# Patient Record
Sex: MALE | Race: BLACK OR AFRICAN AMERICAN | Employment: OTHER | URBAN - METROPOLITAN AREA
[De-identification: names, ages, dates, MRNs, and addresses within clinical notes are randomized per-mention and may not be internally consistent; named-entity substitution may affect disease eponyms.]

---

## 2017-01-04 ENCOUNTER — OFFICE VISIT (OUTPATIENT)
Dept: FAMILY MEDICINE CLINIC | Age: 62
End: 2017-01-04

## 2017-01-04 ENCOUNTER — OFFICE VISIT (OUTPATIENT)
Dept: SURGERY | Age: 62
End: 2017-01-04

## 2017-01-04 VITALS
DIASTOLIC BLOOD PRESSURE: 80 MMHG | BODY MASS INDEX: 32.34 KG/M2 | TEMPERATURE: 98 F | WEIGHT: 231 LBS | HEART RATE: 82 BPM | RESPIRATION RATE: 16 BRPM | OXYGEN SATURATION: 94 % | SYSTOLIC BLOOD PRESSURE: 124 MMHG | HEIGHT: 71 IN

## 2017-01-04 VITALS
TEMPERATURE: 98.1 F | BODY MASS INDEX: 32.2 KG/M2 | HEART RATE: 83 BPM | SYSTOLIC BLOOD PRESSURE: 118 MMHG | WEIGHT: 230 LBS | HEIGHT: 71 IN | DIASTOLIC BLOOD PRESSURE: 80 MMHG | RESPIRATION RATE: 20 BRPM

## 2017-01-04 DIAGNOSIS — Z79.4 TYPE 2 DIABETES MELLITUS WITH DIABETIC NEUROPATHY, WITH LONG-TERM CURRENT USE OF INSULIN (HCC): ICD-10-CM

## 2017-01-04 DIAGNOSIS — E11.40 TYPE 2 DIABETES MELLITUS WITH DIABETIC NEUROPATHY, WITH LONG-TERM CURRENT USE OF INSULIN (HCC): ICD-10-CM

## 2017-01-04 DIAGNOSIS — E87.6 HYPOKALEMIA: ICD-10-CM

## 2017-01-04 DIAGNOSIS — Z12.11 ENCOUNTER FOR SCREENING COLONOSCOPY FOR NON-HIGH-RISK PATIENT: Primary | ICD-10-CM

## 2017-01-04 DIAGNOSIS — I73.9 PAD (PERIPHERAL ARTERY DISEASE) (HCC): Primary | ICD-10-CM

## 2017-01-04 RX ORDER — POTASSIUM CHLORIDE 750 MG/1
10 TABLET, EXTENDED RELEASE ORAL DAILY
Qty: 30 TAB | Refills: 3 | Status: SHIPPED | OUTPATIENT
Start: 2017-01-04 | End: 2017-07-26

## 2017-01-04 RX ORDER — GABAPENTIN 300 MG/1
300 CAPSULE ORAL 2 TIMES DAILY
Qty: 60 CAP | Refills: 3 | Status: SHIPPED | OUTPATIENT
Start: 2017-01-04 | End: 2017-07-26

## 2017-01-04 NOTE — PROGRESS NOTES
Phoebe Martin is a 57 y.o. male who presents today with   Chief Complaint   Patient presents with    Colon Cancer Screening     consult                1. Have you been to the ER, urgent care clinic since your last visit? Hospitalized since your last visit? No    2. Have you seen or consulted any other health care providers outside of the [address] [place] [address] since your last visit? Include any pap smears or colon screening.  No

## 2017-01-04 NOTE — PROGRESS NOTES
In talking with Mr. Adriana Butler he states he just had a stent placed in his leg and is getting a stent placed in the other leg in a few weeks. He will be unable to stop the Xarelto at this time, so I rescheduled him to come back in 3 months for his colon screening. Hopefully at that time we will be able to stop the Xarelto for his procedure. He has had a colon screening in the past through the [place] in which he states he thinks was negative.   Shoaib Vaz NP

## 2017-01-04 NOTE — PROGRESS NOTES
1. Have you been to the ER, urgent care clinic since your last visit? Hospitalized since your last visit?12/26/16 for surgical site bleeding     2. Have you seen or consulted any other health care providers outside of the [facility] since your last visit? Include any pap smears or colon screening.  No

## 2017-01-04 NOTE — MR AVS SNAPSHOT
Visit Information Date & Time Provider Department Dept. Phone Encounter #  
 1/4/2017 10:45 AM David Olmstead, [address] [facility] [phone] Your Appointments 1/25/2017  3:20 PM  
CARELINK with Roldan Ramosi [facility] [place] ([facility]) Appt Note: 3 month f/u after October [facility] [address] [address] [address] [phone] [place] [phone]  
  
    
 4/4/2017 10:30 AM  
Follow Up with Melissa Bentley NP [address] ([address]) Appt Note: Per Osker Cousin R/S due to other medical issues/ Colon screening [address] [facility] [address] [facility] [address] [facility] 88 710 [facility] 951  
  
    
 4/26/2017  3:40 PM  
CARELINK with Roldan Ramosi [facility] [place] ([address] [place] [address] Road) Appt Note: 3 month f/u after January- [facility] [address] [address] [address] [phone]  
  
    
 5/9/2017 11:00 AM  
Follow Up with Kristie Styles MD  
Cardio Specialist at [facility] [address] [facility]) Appt Note: 6 m f/u after [facility] [address] Upcoming Health Maintenance Date Due Hepatitis C Screening 1955 EYE EXAM RETINAL OR DILATED Q1 1/29/1964 DTaP/Tdap/Td series (1 - Tdap) 1/29/1975 MEDICARE YEARLY EXAM 2/10/2017 FOOT EXAM Q1 2/10/2017 MICROALBUMIN Q1 2/10/2017 HEMOGLOBIN A1C Q6M 3/23/2017 LIPID PANEL Q1 9/23/2017 COLONOSCOPY 2/19/2025 Allergies as of 1/4/2017  Review Complete On: 1/4/2017 By: David Olmstead MD  
  
 Severity Noted Reaction Type Reactions Lasix [Furosemide]  05/16/2016    Other (comments) Levofloxacin  05/25/2016    Rash Penicillins  11/28/2015    Swelling Current Immunizations  Reviewed on 10/20/2016 Name Date Influenza Vaccine (Quad) PF 10/20/2016 Pneumococcal Vaccine (Unspecified Type) 1/1/2013 Not reviewed this visit You Were Diagnosed With   
  
 Codes Comments PAD (peripheral artery disease) (HCC)    -  Primary ICD-10-CM: I73.9 ICD-9-CM: 443. 9 Type 2 diabetes mellitus with diabetic neuropathy, with long-term current use of insulin (HCC)     ICD-10-CM: E11.40, Z79.4 ICD-9-CM: 250.60, 357.2, V58.67 Hypokalemia     ICD-10-CM: E87.6 ICD-9-CM: 276.8 Vitals BP Pulse Temp Resp Height(growth percentile) Weight(growth percentile) 124/80 (BP 1 Location: Left arm, BP Patient Position: Sitting) 82 98 °F (36.7 °C) (Oral) 16 5' 11\" (1.803 m) 231 lb (104.8 kg) SpO2 BMI Smoking Status 94% 32.22 kg/m2 Former Smoker BMI and BSA Data Body Mass Index Body Surface Area  
 32.22 kg/m 2 2.29 m 2 Preferred Pharmacy Pharmacy Name Phone [facility] - [address], [address] [phone] Your Updated Medication List  
  
   
This list is accurate as of: 1/4/17 11:16 AM.  Always use your most recent med list.  
  
  
  
  
 acetaminophen 500 mg tablet Commonly known as:  [address] Take 2 Tabs by mouth every six (6) hours as needed for Pain. albuterol 90 mcg/actuation inhaler Commonly known as:  PROVENTIL HFA, VENTOLIN HFA, PROAIR HFA Take 2 Puffs by inhalation every six (6) hours as needed for Wheezing. aspirin 81 mg chewable tablet Take 1 Tab by mouth daily. Indications: MYOCARDIAL INFARCTION PREVENTION  
  
 atorvastatin 80 mg tablet Commonly known as:  LIPITOR Take 1 Tab by mouth nightly. carvedilol 25 mg tablet Commonly known as:  Media Bliss Take 12.5 mg by mouth two (2) times daily (with meals). 1/2 tab BID  
  
 gabapentin 300 mg capsule Commonly known as:  NEURONTIN  
 Take 1 Cap by mouth two (2) times a day. hydrALAZINE 25 mg tablet Commonly known as:  APRESOLINE Take 1 Tab by mouth three (3) times daily. hydroCHLOROthiazide 25 mg tablet Commonly known as:  HYDRODIURIL Take 1 Tab by mouth daily. LANTUS 100 unit/mL injection Generic drug:  insulin glargine 35 Units by SubCUTAneous route nightly. pt takes 30 Units  
  
 losartan 100 mg tablet Commonly known as:  COZAAR Take 1 Tab by mouth daily. mometasone 50 mcg/actuation nasal spray Commonly known as:  NASONEX  
2 Sprays by Both Nostrils route daily. NIFEdipine ER 60 mg ER tablet Commonly known as:  ADALAT CC Take 1 Tab by mouth daily. nitroglycerin 0.4 mg SL tablet Commonly known as:  NITROSTAT  
1 Tab by SubLINGual route every five (5) minutes as needed for Chest Pain.  
  
 potassium chloride 10 mEq tablet Commonly known as:  K-DUR, KLOR-CON Take 1 Tab by mouth daily. PROTONIX 40 mg tablet Generic drug:  pantoprazole Take 40 mg by mouth daily. rivaroxaban 20 mg Tab tablet Commonly known as:  Casarez Chapincito Take 1 Tab by mouth daily (with breakfast). traMADol 50 mg tablet Commonly known as:  ULTRAM  
Take 1 Tab by mouth every eight (8) hours as needed for Pain. Max Daily Amount: 150 mg.  
  
  
  
  
Prescriptions Sent to Pharmacy Refills  
 gabapentin (NEURONTIN) 300 mg capsule 3 Sig: Take 1 Cap by mouth two (2) times a day. Class: Normal  
 Pharmacy: [id number] [facility] [address], [address] Ph #: [phone] Route: Oral  
 potassium chloride (K-DUR, KLOR-CON) 10 mEq tablet 3 Sig: Take 1 Tab by mouth daily. Class: Normal  
 Pharmacy: [id number] [facility] [address], [email or web address] Ph #: [phone] Route: Oral  
  
Introducing [facility] & HEALTH SERVICES! Dear Dagmar Aleman: Thank you for requesting a [employer] account. Our records indicate that you already have an active [employer] account.   You can access your account anytime at [email or web address] [email or web address] Cloud Did you know that you can access your hospital and ER discharge instructions at any time in [employer]? You can also review all of your test results from your hospital stay or ER visit. Additional Information If you have questions, please visit the Frequently Asked Questions section of the [employer] website at [email or web address] Remember, [employer] is NOT to be used for urgent needs. For medical emergencies, dial 911. Now available from your iPhone and Android! Please provide this summary of care documentation to your next provider. Your primary care clinician is listed as Marley Xiao. If you have any questions after today's visit, please call [phone].

## 2017-01-04 NOTE — PROGRESS NOTES
Natalie Barnes is a 57 y.o.  male and presents with     Chief Complaint   Patient presents with    Claudication    Diabetes    Anticoagulation     Pt went to the ER twice since last visit with leg pain. Later he was seen by vascular and angioplasty on his rt lower ext. Pt is taking Neurontin once daily and he says it is helping but not enough. Pt says laisx caused side effects suc has rash on his face so he stopped it. Pt is on Xarelto for left upper ext DVT. Past Medical History   Diagnosis Date    Biventricular ICD (implantable cardioverter-defibrillator) in place 07/16     [employer]    CAD (coronary artery disease)     Cardiomyopathy, ischemic      EF 30% (04/16) 30-35% (11/15)    Diabetes [facility]     DVT (deep venous thrombosis) ([facility] 08/16     L axillary vein after PPM insertion    HLD (hyperlipidemia)     Hypertension     NSTEMI (non-ST elevated myocardial infarction) ([facility]      S/P OM1 2.5 X 23 mm XIENCE (11/15)    Pulmonary emphysema ([facility]     Stroke [facility])      Past Surgical History   Procedure Laterality Date    Vascular surgery procedure unlist       Stent placed right thigh     Current Outpatient Prescriptions   Medication Sig    gabapentin (NEURONTIN) 300 mg capsule Take 1 Cap by mouth two (2) times a day.  potassium chloride (K-DUR, KLOR-CON) 10 mEq tablet Take 1 Tab by mouth daily.  acetaminophen (TYLENOL EXTRA STRENGTH) 500 mg tablet Take 2 Tabs by mouth every six (6) hours as needed for Pain.  rivaroxaban (XARELTO) 20 mg tab tablet Take 1 Tab by mouth daily (with breakfast).  mometasone (NASONEX) 50 mcg/actuation nasal spray 2 Sprays by Both Nostrils route daily.  pantoprazole (PROTONIX) 40 mg tablet Take 40 mg by mouth daily.  albuterol (PROVENTIL HFA, VENTOLIN HFA, PROAIR HFA) 90 mcg/actuation inhaler Take 2 Puffs by inhalation every six (6) hours as needed for Wheezing.     carvedilol (COREG) 25 mg tablet Take 12.5 mg by mouth two (2) times daily (with meals). 1/2 tab BID    nitroglycerin (NITROSTAT) 0.4 mg SL tablet 1 Tab by SubLINGual route every five (5) minutes as needed for Chest Pain.  NIFEdipine ER (ADALAT CC) 60 mg ER tablet Take 1 Tab by mouth daily.  losartan (COZAAR) 100 mg tablet Take 1 Tab by mouth daily.  hydrochlorothiazide (HYDRODIURIL) 25 mg tablet Take 1 Tab by mouth daily.  aspirin 81 mg chewable tablet Take 1 Tab by mouth daily. Indications: MYOCARDIAL INFARCTION PREVENTION    atorvastatin (LIPITOR) 80 mg tablet Take 1 Tab by mouth nightly.  hydrALAZINE (APRESOLINE) 25 mg tablet Take 1 Tab by mouth three (3) times daily.  insulin glargine (LANTUS) 100 unit/mL injection 35 Units by SubCUTAneous route nightly. pt takes 30 Units     traMADol (ULTRAM) 50 mg tablet Take 1 Tab by mouth every eight (8) hours as needed for Pain. Max Daily Amount: 150 mg. No current facility-administered medications for this visit. Health Maintenance   Topic Date Due    Hepatitis C Screening  1955    EYE EXAM RETINAL OR DILATED Q1  01/29/1964    DTaP/Tdap/Td series (1 - Tdap) 01/29/1975    MEDICARE YEARLY EXAM  02/10/2017    FOOT EXAM Q1  02/10/2017    MICROALBUMIN Q1  02/10/2017    HEMOGLOBIN A1C Q6M  03/23/2017    LIPID PANEL Q1  09/23/2017    COLONOSCOPY  02/19/2025    ZOSTER VACCINE AGE 60>  Completed    Pneumococcal 19-64 Medium Risk  Completed    INFLUENZA AGE 9 TO ADULT  Completed     Immunization History   Administered Date(s) Administered    Influenza Vaccine (Quad) PF 10/20/2016    Pneumococcal Vaccine (Unspecified Type) 01/01/2013     No LMP for male patient. Allergies and Intolerances: Allergies   Allergen Reactions    Lasix [Furosemide] Other (comments)    Levofloxacin Rash    Penicillins Swelling       Family History:   Family History   Problem Relation Age of Onset    Heart Disease Mother     Heart Disease Father        Social History:   He  reports that he quit smoking about 13 months ago. His smoking use included Cigarettes. He has a 45.00 pack-year smoking history. He has never used smokeless tobacco.  He  reports that he does not drink alcohol. Review of Systems:   General: negative for - chills, fatigue, fever, weight change  Psych: negative for - anxiety, depression, irritability or mood swings  ENT: negative for - headaches, hearing change, nasal congestion, oral lesions, sneezing or sore throat  Heme/ Lymph: negative for - bleeding problems, bruising, pallor or swollen lymph nodes  Endo: negative for - hot flashes, polydipsia/polyuria or temperature intolerance  Resp: negative for - cough, shortness of breath or wheezing  CV: negative for - chest pain, edema or palpitations  GI: negative for - abdominal pain, change in bowel habits, constipation, diarrhea or nausea/vomiting  : negative for - dysuria, hematuria, incontinence, pelvic pain or vulvar/vaginal symptoms  MSK: negative for - joint pain, joint swelling or muscle pain, pos for leg pain  Neuro: negative for - confusion, headaches, seizures or weakness  Derm: negative for - dry skin, hair changes, rash or skin lesion changes          Physical:   Vitals:   Vitals:    01/04/17 1050   BP: 124/80   Pulse: 82   Resp: 16   Temp: 98 °F (36.7 °C)   TempSrc: Oral   SpO2: 94%   Weight: 231 lb (104.8 kg)   Height: 5' 11\" (1.803 m)           Exam:   HEENT- atraumatic,normocephalic, awake, oriented, well nourished  Neck - supple,no enlarged lymph nodes, no JVD, no thyromegaly  Chest- CTA, no rhonchi, no crackles  Heart- rrr, no murmurs / gallop/rub  Abdomen- soft,BS+,NT, no hepatosplenomegaly  Ext - no c/c/edema   Neuro- no focal deficits. Power 5/5 all extremities  Skin - warm,dry, no obvious rashes.           Review of Data:   LABS:   Lab Results   Component Value Date/Time    WBC 6.4 12/26/2016 12:30 PM    HGB 13.4 12/26/2016 12:30 PM    HCT 40.9 12/26/2016 12:30 PM    PLATELET 091 61/84/2884 12:30 PM     Lab Results   Component Value Date/Time    Sodium 141 12/26/2016 12:30 PM    Potassium 3.3 12/26/2016 12:30 PM    Chloride 105 12/26/2016 12:30 PM    CO2 26 12/26/2016 12:30 PM    Glucose 88 12/26/2016 12:30 PM    BUN 23 12/26/2016 12:30 PM    Creatinine 1.12 12/26/2016 12:30 PM     Lab Results   Component Value Date/Time    Cholesterol, total 128 09/23/2016 09:36 AM    HDL Cholesterol 59 09/23/2016 09:36 AM    LDL, calculated 50 09/23/2016 09:36 AM    Triglyceride 94 09/23/2016 09:36 AM     No results found for: GPT        Impression / Plan:        ICD-10-CM ICD-9-CM    1. PAD (peripheral artery disease) ([facility]) I73.9 443.9    2. Type 2 diabetes mellitus with diabetic neuropathy, with long-term current use of insulin ([facility]) E11.40 250.60 gabapentin (NEURONTIN) 300 mg capsule    Z79.4 357.2      V58.67    3. Hypokalemia E87.6 276.8 potassium chloride (K-DUR, KLOR-CON) 10 mEq tablet     Follow up with vascular. Explained to patient risk benefits of the medications. Advised patient to stop meds if having any side effects. Pt verbalized understanding of the instructions. I have discussed the diagnosis with the patient and the intended plan as seen in the above orders. The patient has received an after-visit summary and questions were answered concerning future plans. I have discussed medication side effects and warnings with the patient as well. I have reviewed the plan of care with the patient, accepted their input and they are in agreement with the treatment goals. Reviewed plan of care. Patient has provided input and agrees with goals.     Follow-up Disposition: Not on Anatoly Blue MD

## 2017-01-04 NOTE — MR AVS SNAPSHOT
Visit Information Date & Time Provider Department Dept. Phone Encounter #  
 1/4/2017 10:30 AM Nataly Tristan NP [facility] [phone] [id number] Your Appointments 1/4/2017 10:30 AM  
COLON SCREEN with Nataly Tristan NP Wes Raegan ([address] [place] [address]) Appt Note: Colon cancer screening/ referred by Dr. Shi Mccall; $0 cp/ NEED REFERRAL-REQUEST FROM [facility] . ../mor; Pt r/s. ..had to have emergent surgery [address] [facility] [address] [facility] [address]  
  
    
 1/5/2017 10:15 AM  
Follow Up with Fabrizio Murillo MD  
[facility] [address] [place] [address]) Appt Note: f/u  
 [address] [facility] [address]  
  
    
 1/25/2017  3:20 PM  
CARELINK with Pacer Hv Csi [facility] ([address] [place] [address]) Appt Note: 3 month f/u after October -[facility] [address] [address] [id number] [phone] [place] [address]  
  
    
 4/4/2017 10:30 AM  
Follow Up with Nataly Tristan NP Wes Raegan ([address] [place] [address]) Appt Note: Per Maria Guadalupe Bridegroom R/S due to other medical issues/ Colon screening [address] [facility] [address] [place] [address] 88 710 [facility] [address]  
  
    
 4/26/2017  3:40 PM  
CARELINK with Pacer [facility] ([address] [place] [address]) Appt Note: 3 month f/u after January- [facility] [address] [address] [address] [phone]  
  
    
 5/9/2017 11:00 AM  
Follow Up with Amber Kay MD  
Cardio Specialist at [address]) Appt Note: 6 m f/u after ECHO [address] Upcoming Health Maintenance Date Due Hepatitis C Screening 1955 EYE EXAM RETINAL OR DILATED Q1 1/29/1964 DTaP/Tdap/Td series (1 - Tdap) 1/29/1975 MEDICARE YEARLY EXAM 2/10/2017 FOOT EXAM Q1 2/10/2017 MICROALBUMIN Q1 2/10/2017 HEMOGLOBIN A1C Q6M 3/23/2017 LIPID PANEL Q1 9/23/2017 COLONOSCOPY 2/19/2025 Allergies as of 1/4/2017  Review Complete On: 1/4/2017 By: Shoaib Vaz NP Severity Noted Reaction Type Reactions Levofloxacin  05/25/2016    Rash Penicillins  11/28/2015    Swelling Current Immunizations  Reviewed on 10/20/2016 Name Date Influenza Vaccine (Quad) PF 10/20/2016 Pneumococcal Vaccine (Unspecified Type) 1/1/2013 Not reviewed this visit Vitals BP Pulse Temp Resp Height(growth percentile) Weight(growth percentile) 118/80 (BP 1 Location: Right arm, BP Patient Position: At rest) 83 98.1 °F (36.7 °C) (Oral) 20 5' 11\" (1.803 m) 230 lb (104.3 kg) BMI Smoking Status 32.08 kg/m2 Former Smoker BMI and BSA Data Body Mass Index Body Surface Area 32.08 kg/m 2 2.29 m 2 Preferred Pharmacy Pharmacy Name Phone [facility] - [address], [address] [phone] Your Updated Medication List  
  
   
This list is accurate as of: 1/4/17 10:22 AM.  Always use your most recent med list.  
  
  
  
  
 acetaminophen 500 mg tablet Commonly known as:  [address] Take 2 Tabs by mouth every six (6) hours as needed for Pain. albuterol 90 mcg/actuation inhaler Commonly known as:  PROVENTIL HFA, VENTOLIN HFA, PROAIR HFA Take 2 Puffs by inhalation every six (6) hours as needed for Wheezing. aspirin 81 mg chewable tablet Take 1 Tab by mouth daily.  Indications: MYOCARDIAL INFARCTION PREVENTION  
  
 atorvastatin 80 mg tablet Commonly known as:  LIPITOR Take 1 Tab by mouth nightly. carvedilol 25 mg tablet Commonly known as:  Radha Dome Take 12.5 mg by mouth two (2) times daily (with meals). 1/2 tab BID  
  
 furosemide 20 mg tablet Commonly known as:  LASIX One po  daily  
  
 gabapentin 300 mg capsule Commonly known as:  NEURONTIN Take 1 Cap by mouth nightly. hydrALAZINE 25 mg tablet Commonly known as:  APRESOLINE Take 1 Tab by mouth three (3) times daily. hydroCHLOROthiazide 25 mg tablet Commonly known as:  HYDRODIURIL Take 1 Tab by mouth daily. LANTUS 100 unit/mL injection Generic drug:  insulin glargine 35 Units by SubCUTAneous route nightly. pt takes 30 Units  
  
 losartan 100 mg tablet Commonly known as:  COZAAR Take 1 Tab by mouth daily. mometasone 50 mcg/actuation nasal spray Commonly known as:  NASONEX  
2 Sprays by Both Nostrils route daily. NIFEdipine ER 60 mg ER tablet Commonly known as:  ADALAT CC Take 1 Tab by mouth daily. nitroglycerin 0.4 mg SL tablet Commonly known as:  NITROSTAT  
1 Tab by SubLINGual route every five (5) minutes as needed for Chest Pain. PROTONIX 40 mg tablet Generic drug:  pantoprazole Take 40 mg by mouth daily. rivaroxaban 20 mg Tab tablet Commonly known as:  Minal Root Take 1 Tab by mouth daily (with breakfast). traMADol 50 mg tablet Commonly known as:  ULTRAM  
Take 1 Tab by mouth every eight (8) hours as needed for Pain. Max Daily Amount: 150 mg. Introducing \[facility]\"" & HEALTH SERVICES! Dear Danyelle Gonzalez: Thank you for requesting a [employer] ) account. Our records indicate that you already have an active [employer] ( [employer] ) account. You can access your account anytime at [email or web address] Did you know that you can access your hospital and ER discharge instructions at any time in [employer] ( HLM )?   You can also review all of your test results from your hospital stay or ER visit. Additional Information If you have questions, please visit the Frequently Asked Questions section of the [employer] website at [email or web address] Remember, [employer] is NOT to be used for urgent needs. For medical emergencies, dial 911. Now available from your iPhone and Android! Please provide this summary of care documentation to your next provider. Your primary care clinician is listed as [phone] STE[address]inberg. If you have any questions after today's visit, please call [phone].

## 2017-01-20 ENCOUNTER — TELEPHONE (OUTPATIENT)
Dept: PULMONOLOGY | Facility: CLINIC | Age: 62
End: 2017-01-20

## 2017-02-01 ENCOUNTER — OFFICE VISIT (OUTPATIENT)
Dept: FAMILY MEDICINE CLINIC | Age: 62
End: 2017-02-01

## 2017-02-01 VITALS
RESPIRATION RATE: 18 BRPM | TEMPERATURE: 97.4 F | SYSTOLIC BLOOD PRESSURE: 125 MMHG | HEART RATE: 74 BPM | HEIGHT: 71 IN | OXYGEN SATURATION: 94 % | WEIGHT: 230 LBS | DIASTOLIC BLOOD PRESSURE: 75 MMHG | BODY MASS INDEX: 32.2 KG/M2

## 2017-02-01 DIAGNOSIS — E11.40 TYPE 2 DIABETES MELLITUS WITH DIABETIC NEUROPATHY, WITH LONG-TERM CURRENT USE OF INSULIN (HCC): ICD-10-CM

## 2017-02-01 DIAGNOSIS — I73.9 PAD (PERIPHERAL ARTERY DISEASE) (HCC): ICD-10-CM

## 2017-02-01 DIAGNOSIS — I10 ESSENTIAL HYPERTENSION: ICD-10-CM

## 2017-02-01 DIAGNOSIS — Z79.4 TYPE 2 DIABETES MELLITUS WITH DIABETIC NEUROPATHY, WITH LONG-TERM CURRENT USE OF INSULIN (HCC): ICD-10-CM

## 2017-02-01 DIAGNOSIS — I82.622 DEEP VEIN THROMBOSIS (DVT) OF OTHER VEIN OF LEFT UPPER EXTREMITY: Primary | ICD-10-CM

## 2017-02-01 NOTE — PROGRESS NOTES
Natalie Barnes is a 60 y.o.  male and presents with     Chief Complaint   Patient presents with    Diabetes    Hypertension    Coronary Artery Disease    Claudication       Pt informs that the leg pain has improved. He feels the procedure along with Neurontin has helped him a lot. Pt is taking Xaretlo for DVT LUE after proedure. No bleeding. Pt is taking Lantus 35 units subcut daily and sugars are fine. No chests pain or SOB. Past Medical History   Diagnosis Date    Biventricular ICD (implantable cardioverter-defibrillator) in place 07/16     Medtronic    CAD (coronary artery disease)     Cardiomyopathy, ischemic      EF 30% (04/16) 30-35% (11/15)    Diabetes [facility]     DVT (deep venous thrombosis) ([facility] 08/16     L axillary vein after PPM insertion    HLD (hyperlipidemia)     Hypertension     NSTEMI (non-ST elevated myocardial infarction) ([facility]      S/P OM1 2.5 X 23 mm XIENCE (11/15)    Pulmonary emphysema ([facility]     Stroke [facility])      Past Surgical History   Procedure Laterality Date    Vascular surgery procedure unlist       Stent placed right thigh     Current Outpatient Prescriptions   Medication Sig    gabapentin (NEURONTIN) 300 mg capsule Take 1 Cap by mouth two (2) times a day.  potassium chloride (K-DUR, KLOR-CON) 10 mEq tablet Take 1 Tab by mouth daily.  traMADol (ULTRAM) 50 mg tablet Take 1 Tab by mouth every eight (8) hours as needed for Pain. Max Daily Amount: 150 mg.    rivaroxaban (XARELTO) 20 mg tab tablet Take 1 Tab by mouth daily (with breakfast).  mometasone (NASONEX) 50 mcg/actuation nasal spray 2 Sprays by Both Nostrils route daily.  pantoprazole (PROTONIX) 40 mg tablet Take 40 mg by mouth daily.  albuterol (PROVENTIL HFA, VENTOLIN HFA, PROAIR HFA) 90 mcg/actuation inhaler Take 2 Puffs by inhalation every six (6) hours as needed for Wheezing.  carvedilol (COREG) 25 mg tablet Take 12.5 mg by mouth two (2) times daily (with meals).  1/2 tab BID    nitroglycerin (NITROSTAT) 0.4 mg SL tablet 1 Tab by SubLINGual route every five (5) minutes as needed for Chest Pain.  NIFEdipine ER (ADALAT CC) 60 mg ER tablet Take 1 Tab by mouth daily.  losartan (COZAAR) 100 mg tablet Take 1 Tab by mouth daily.  hydrochlorothiazide (HYDRODIURIL) 25 mg tablet Take 1 Tab by mouth daily.  aspirin 81 mg chewable tablet Take 1 Tab by mouth daily. Indications: MYOCARDIAL INFARCTION PREVENTION    atorvastatin (LIPITOR) 80 mg tablet Take 1 Tab by mouth nightly.  hydrALAZINE (APRESOLINE) 25 mg tablet Take 1 Tab by mouth three (3) times daily.  insulin glargine (LANTUS) 100 unit/mL injection 35 Units by SubCUTAneous route nightly. pt takes 30 Units     acetaminophen (TYLENOL EXTRA STRENGTH) 500 mg tablet Take 2 Tabs by mouth every six (6) hours as needed for Pain. No current facility-administered medications for this visit. Health Maintenance   Topic Date Due    Hepatitis C Screening  1955    EYE EXAM RETINAL OR DILATED Q1  01/29/1964    DTaP/Tdap/Td series (1 - Tdap) 01/29/1975    FOOT EXAM Q1  02/10/2017    MICROALBUMIN Q1  02/10/2017    MEDICARE YEARLY EXAM  02/10/2017    HEMOGLOBIN A1C Q6M  03/23/2017    LIPID PANEL Q1  09/23/2017    COLONOSCOPY  02/19/2025    ZOSTER VACCINE AGE 60>  Completed    Pneumococcal 19-64 Medium Risk  Completed    INFLUENZA AGE 9 TO ADULT  Completed     Immunization History   Administered Date(s) Administered    Influenza Vaccine (Quad) PF 10/20/2016    Pneumococcal Vaccine (Unspecified Type) 01/01/2013     No LMP for male patient. Allergies and Intolerances: Allergies   Allergen Reactions    Lasix [Furosemide] Other (comments)    Levofloxacin Rash    Penicillins Swelling       Family History:   Family History   Problem Relation Age of Onset    Heart Disease Mother     Heart Disease Father        Social History:   He  reports that he quit smoking about 14 months ago.  His smoking use included Cigarettes. He has a 45.00 pack-year smoking history. He has never used smokeless tobacco.  He  reports that he does not drink alcohol. Review of Systems:   General: negative for - chills, fatigue, fever, weight change  Psych: negative for - anxiety, depression, irritability or mood swings  ENT: negative for - headaches, hearing change, nasal congestion, oral lesions, sneezing or sore throat  Heme/ Lymph: negative for - bleeding problems, bruising, pallor or swollen lymph nodes  Endo: negative for - hot flashes, polydipsia/polyuria or temperature intolerance  Resp: negative for - cough, shortness of breath or wheezing  CV: negative for - chest pain, edema or palpitations  GI: negative for - abdominal pain, change in bowel habits, constipation, diarrhea or nausea/vomiting  : negative for - dysuria, hematuria, incontinence, pelvic pain or vulvar/vaginal symptoms  MSK: negative for - joint pain, joint swelling or muscle pain  Neuro: negative for - confusion, headaches, seizures or weakness  Derm: negative for - dry skin, hair changes, rash or skin lesion changes          Physical:   Vitals:   Vitals:    02/01/17 1011   BP: 125/75   Pulse: 74   Resp: 18   Temp: 97.4 °F (36.3 °C)   TempSrc: Oral   SpO2: 94%   Weight: 230 lb (104.3 kg)   Height: 5' 11\" (1.803 m)           Exam:   HEENT- atraumatic,normocephalic, awake, oriented, well nourished  Neck - supple,no enlarged lymph nodes, no JVD, no thyromegaly  Chest- CTA, no rhonchi, no crackles  Heart- rrr, no murmurs / gallop/rub  Abdomen- soft,BS+,NT, no hepatosplenomegaly  Ext - no c/c/edema   Neuro- no focal deficits. Power 5/5 all extremities  Skin - warm,dry, no obvious rashes.           Review of Data:   LABS:   Lab Results   Component Value Date/Time    WBC 6.4 12/26/2016 12:30 PM    HGB 13.4 12/26/2016 12:30 PM    HCT 40.9 12/26/2016 12:30 PM    PLATELET 384 32/17/6533 12:30 PM     Lab Results   Component Value Date/Time    Sodium 141 12/26/2016 12:30 PM Potassium 3.3 12/26/2016 12:30 PM    Chloride 105 12/26/2016 12:30 PM    CO2 26 12/26/2016 12:30 PM    Glucose 88 12/26/2016 12:30 PM    BUN 23 12/26/2016 12:30 PM    Creatinine 1.12 12/26/2016 12:30 PM     Lab Results   Component Value Date/Time    Cholesterol, total 128 09/23/2016 09:36 AM    HDL Cholesterol 59 09/23/2016 09:36 AM    LDL, calculated 50 09/23/2016 09:36 AM    Triglyceride 94 09/23/2016 09:36 AM     No results found for: GPT        Impression / Plan:        ICD-10-CM ICD-9-CM    1. Deep vein thrombosis (DVT) of other vein of left upper extremity ([facility]) I82.622     2. Type 2 diabetes mellitus with diabetic neuropathy, with long-term current use of insulin ([facility]) E11.40 250.60     Z79.4 357.2      V58.67    3. PAD (peripheral artery disease) ([facility]) I73.9 443.9    4. Essential hypertension I10 401.9      PAD - leg pain improved    LUE DVT - contune Xarelto for 2 more months    Repeat dopplers in 2 months    DM - stable    HTN- stable      Explained to patient risk benefits of the medications. Advised patient to stop meds if having any side effects. Pt verbalized understanding of the instructions. I have discussed the diagnosis with the patient and the intended plan as seen in the above orders. The patient has received an after-visit summary and questions were answered concerning future plans. I have discussed medication side effects and warnings with the patient as well. I have reviewed the plan of care with the patient, accepted their input and they are in agreement with the treatment goals. Reviewed plan of care. Patient has provided input and agrees with goals.     Follow-up Disposition: Not on Ngozi Olmedo MD

## 2017-02-01 NOTE — PROGRESS NOTES
Patient presents to clinic for routine follow up. Advance Directive:    1. Do you have an advance directive in place? Patient Reply:     2. If not, would you like material regarding how to put one in place? Patient Reply:      Coordination of Care:    1. Have you been to the ER, urgent care clinic since your last visit? Hospitalized since your last visit? No    2. Have you seen or consulted any other health care providers outside of the [address] [place] [address] since your last visit? Include any pap smears or colon screening. No    Depression Screening completed. Learning Assessment completed. Abuse Screening completed. Health Maintenance reviewed and discussed per provider.

## 2017-03-16 ENCOUNTER — OFFICE VISIT (OUTPATIENT)
Dept: FAMILY MEDICINE CLINIC | Age: 62
End: 2017-03-16

## 2017-03-16 VITALS
WEIGHT: 233.6 LBS | BODY MASS INDEX: 32.7 KG/M2 | DIASTOLIC BLOOD PRESSURE: 70 MMHG | SYSTOLIC BLOOD PRESSURE: 108 MMHG | TEMPERATURE: 97.1 F | RESPIRATION RATE: 18 BRPM | HEART RATE: 74 BPM | OXYGEN SATURATION: 94 % | HEIGHT: 71 IN

## 2017-03-16 DIAGNOSIS — R05.9 COUGH: Primary | ICD-10-CM

## 2017-03-16 DIAGNOSIS — I82.622 DEEP VEIN THROMBOSIS (DVT) OF OTHER VEIN OF LEFT UPPER EXTREMITY: ICD-10-CM

## 2017-03-16 RX ORDER — BENZONATATE 100 MG/1
100 CAPSULE ORAL
Qty: 30 CAP | Refills: 1 | Status: SHIPPED | OUTPATIENT
Start: 2017-03-16 | End: 2017-03-23

## 2017-03-16 NOTE — PROGRESS NOTES
Daniela Witt is a 60 y.o.  male and presents with     Chief Complaint   Patient presents with    Nasal Congestion     chest congested     Cold Symptoms    Blood Clot       Pt has symptoms of cold and congestion. No fever or chills. He wants to make sure it is not in his lungs. He deneis SOB. Pt ran out of Xarelto. Pt takes it for his left upper ext DVT. Past Medical History:   Diagnosis Date    Biventricular ICD (implantable cardioverter-defibrillator) in place 07/16    Medtronic    CAD (coronary artery disease)     Cardiomyopathy, ischemic     EF 30% (04/16) 30-35% (11/15)    [facility]     DVT (deep venous thrombosis) ([facility] 08/16    L axillary vein after PPM insertion    HLD (hyperlipidemia)     Hypertension     NSTEMI (non-ST elevated myocardial infarction) ([facility]     S/P OM1 2.5 X 23 mm XIENCE (11/15)    Pulmonary emphysema [facility]     Stroke [facility])      Past Surgical History:   Procedure Laterality Date    VASCULAR SURGERY PROCEDURE UNLIST      Stent placed right thigh     Current Outpatient Prescriptions   Medication Sig    rivaroxaban (XARELTO) 20 mg tab tablet Take 1 Tab by mouth daily (with breakfast).  benzonatate (TESSALON) 100 mg capsule Take 1 Cap by mouth three (3) times daily as needed for Cough for up to 7 days.  gabapentin (NEURONTIN) 300 mg capsule Take 1 Cap by mouth two (2) times a day.  potassium chloride (K-DUR, KLOR-CON) 10 mEq tablet Take 1 Tab by mouth daily.  acetaminophen (TYLENOL EXTRA STRENGTH) 500 mg tablet Take 2 Tabs by mouth every six (6) hours as needed for Pain.  traMADol (ULTRAM) 50 mg tablet Take 1 Tab by mouth every eight (8) hours as needed for Pain. Max Daily Amount: 150 mg.    mometasone (NASONEX) 50 mcg/actuation nasal spray 2 Sprays by Both Nostrils route daily.  pantoprazole (PROTONIX) 40 mg tablet Take 40 mg by mouth daily.     albuterol (PROVENTIL HFA, VENTOLIN HFA, PROAIR HFA) 90 mcg/actuation inhaler Take 2 Puffs by inhalation every six (6) hours as needed for Wheezing.  carvedilol (COREG) 25 mg tablet Take 12.5 mg by mouth two (2) times daily (with meals). 1/2 tab BID    nitroglycerin (NITROSTAT) 0.4 mg SL tablet 1 Tab by SubLINGual route every five (5) minutes as needed for Chest Pain.  NIFEdipine ER (ADALAT CC) 60 mg ER tablet Take 1 Tab by mouth daily.  losartan (COZAAR) 100 mg tablet Take 1 Tab by mouth daily.  hydrochlorothiazide (HYDRODIURIL) 25 mg tablet Take 1 Tab by mouth daily.  aspirin 81 mg chewable tablet Take 1 Tab by mouth daily. Indications: MYOCARDIAL INFARCTION PREVENTION    atorvastatin (LIPITOR) 80 mg tablet Take 1 Tab by mouth nightly.  hydrALAZINE (APRESOLINE) 25 mg tablet Take 1 Tab by mouth three (3) times daily.  insulin glargine (LANTUS) 100 unit/mL injection 35 Units by SubCUTAneous route nightly. pt takes 30 Units      No current facility-administered medications for this visit. Health Maintenance   Topic Date Due    Hepatitis C Screening  1955    EYE EXAM RETINAL OR DILATED Q1  01/29/1964    DTaP/Tdap/Td series (1 - Tdap) 01/29/1975    MEDICARE YEARLY EXAM  02/10/2017    FOOT EXAM Q1  02/10/2017    MICROALBUMIN Q1  02/10/2017    HEMOGLOBIN A1C Q6M  03/23/2017    LIPID PANEL Q1  09/23/2017    COLONOSCOPY  02/19/2025    ZOSTER VACCINE AGE 60>  Completed    Pneumococcal 19-64 Medium Risk  Completed    INFLUENZA AGE 9 TO ADULT  Completed     Immunization History   Administered Date(s) Administered    Influenza Vaccine (Quad) PF 10/20/2016    Pneumococcal Vaccine (Unspecified Type) 01/01/2013     No LMP for male patient. Allergies and Intolerances:    Allergies   Allergen Reactions    Lasix [Furosemide] Other (comments)    Levofloxacin Rash    Penicillins Swelling       Family History:   Family History   Problem Relation Age of Onset    Heart Disease Mother     Heart Disease Father        Social History:   He  reports that he quit smoking about 16 months ago. His smoking use included Cigarettes. He has a 45.00 pack-year smoking history. He has never used smokeless tobacco.  He  reports that he does not drink alcohol. Review of Systems:   General: negative for - chills, fatigue, fever, weight change  Psych: negative for - anxiety, depression, irritability or mood swings  ENT: negative for - headaches, hearing change, nasal congestion, oral lesions, sneezing or sore throat  Heme/ Lymph: negative for - bleeding problems, bruising, pallor or swollen lymph nodes  Endo: negative for - hot flashes, polydipsia/polyuria or temperature intolerance  Resp: negative for - cough, shortness of breath or wheezing, pos for cough , congestion  CV: negative for - chest pain, edema or palpitations  GI: negative for - abdominal pain, change in bowel habits, constipation, diarrhea or nausea/vomiting  : negative for - dysuria, hematuria, incontinence, pelvic pain or vulvar/vaginal symptoms  MSK: negative for - joint pain, joint swelling or muscle pain  Neuro: negative for - confusion, headaches, seizures or weakness  Derm: negative for - dry skin, hair changes, rash or skin lesion changes          Physical:   Vitals:   Vitals:    03/16/17 1002   BP: 108/70   Pulse: 74   Resp: 18   Temp: 97.1 °F (36.2 °C)   TempSrc: Oral   SpO2: 94%   Weight: 233 lb 9.6 oz (106 kg)   Height: 5' 11\" (1.803 m)           Exam:   HEENT- atraumatic,normocephalic, awake, oriented, well nourished, mild nasal congestion, throat normal   Neck - supple,no enlarged lymph nodes, no JVD, no thyromegaly  Chest- CTA, no rhonchi, no crackles  Heart- rrr, no murmurs / gallop/rub  Abdomen- soft,BS+,NT, no hepatosplenomegaly  Ext - no c/c/edema   Neuro- no focal deficits. Power 5/5 all extremities  Skin - warm,dry, no obvious rashes.           Review of Data:   LABS:   Lab Results   Component Value Date/Time    WBC 6.4 12/26/2016 12:30 PM    HGB 13.4 12/26/2016 12:30 PM    HCT 40.9 12/26/2016 12:30 PM PLATELET 413 10/71/6800 12:30 PM     Lab Results   Component Value Date/Time    Sodium 141 12/26/2016 12:30 PM    Potassium 3.3 12/26/2016 12:30 PM    Chloride 105 12/26/2016 12:30 PM    CO2 26 12/26/2016 12:30 PM    Glucose 88 12/26/2016 12:30 PM    BUN 23 12/26/2016 12:30 PM    Creatinine 1.12 12/26/2016 12:30 PM     Lab Results   Component Value Date/Time    Cholesterol, total 128 09/23/2016 09:36 AM    HDL Cholesterol 59 09/23/2016 09:36 AM    LDL, calculated 50 09/23/2016 09:36 AM    Triglyceride 94 09/23/2016 09:36 AM     No results found for: GPT        Impression / Plan:        ICD-10-CM ICD-9-CM    1. Cough R05 786.2 benzonatate (TESSALON) 100 mg capsule   2. Deep vein thrombosis (DVT) of other vein of left upper extremity (HCC) I82.622  rivaroxaban (XARELTO) 20 mg tab tablet      DUPLEX UPPER EXT VENOUS LEFT     Cold - reassured pt, tylenol prn. Explained to patient risk benefits of the medications. Advised patient to stop meds if having any side effects. Pt verbalized understanding of the instructions. I have discussed the diagnosis with the patient and the intended plan as seen in the above orders. The patient has received an after-visit summary and questions were answered concerning future plans. I have discussed medication side effects and warnings with the patient as well. I have reviewed the plan of care with the patient, accepted their input and they are in agreement with the treatment goals. Reviewed plan of care. Patient has provided input and agrees with goals.     Follow-up Disposition: Not on Ngozi Olmedo MD

## 2017-03-16 NOTE — MR AVS SNAPSHOT
Visit Information Date & Time Provider Department Dept. Phone Encounter #  
 3/16/2017 10:00 AM [address], [address] [facility] [phone] [id number] Follow-up Instructions Return in about 1 month (around 4/16/2017) for Medicare welllness. Follow-up and Disposition History Your Appointments 4/3/2017 11:00 AM  
Office Visit with Manolo S Lorri Weinberg MD  
[facility] Appt Note: Return in 2 months (around 4/1/17) for Medicare Wellness. [address] [facility] 500 [facility]  
  
   
 [id number] [place] [address] [address]  
  
    
 4/4/2017 10:30 AM  
Follow Up with Mally Lipscomb NP [address] ([facility]) Appt Note: Per Joey Evans R/S due to other medical issues/ Colon screening [address] [facility] [address] [facility] [address] [place] [address] 95  
  
    
 4/26/2017  3:40 PM  
CARELINK with Roldan Holman [facility] ([facility] Appt Note: 3 month f/u after January- [facility] [address] Arreaga [id number]  
[phone] [facility] 53 [phone]  
  
    
 5/9/2017 11:00 AM  
Follow Up with Natalia Glass MD  
[facility] Appt Note: 6 m f/u after [facility] [address] Upcoming Health Maintenance Date Due Hepatitis C Screening 1955 EYE EXAM RETINAL OR DILATED Q1 1/29/1964 DTaP/Tdap/Td series (1 - Tdap) 1/29/1975 MEDICARE YEARLY EXAM 2/10/2017 FOOT EXAM Q1 2/10/2017 MICROALBUMIN Q1 2/10/2017 HEMOGLOBIN A1C Q6M 3/23/2017 LIPID PANEL Q1 9/23/2017 COLONOSCOPY 2/19/2025 Allergies as of 3/16/2017  Review Complete On: 3/16/2017 By: June Cullen MD  
  
 Severity Noted Reaction Type Reactions Lasix [Furosemide]  05/16/2016    Other (comments) Levofloxacin  05/25/2016    Rash Penicillins  11/28/2015    Swelling Current Immunizations  Reviewed on 10/20/2016 Name Date Influenza Vaccine (Quad) PF 10/20/2016 Pneumococcal Vaccine (Unspecified Type) 1/1/2013 Not reviewed this visit You Were Diagnosed With   
  
 Codes Comments Cough    -  Primary ICD-10-CM: F08 ICD-9-CM: 786.2 Deep vein thrombosis (DVT) of other vein of left upper extremity (HCC)     ICD-10-CM: X27.786 Vitals BP Pulse Temp Resp Height(growth percentile) Weight(growth percentile) 108/70 (BP 1 Location: Right arm, BP Patient Position: At rest) 74 97.1 °F (36.2 °C) (Oral) 18 5' 11\" (1.803 m) 233 lb 9.6 oz (106 kg) SpO2 BMI Smoking Status 94% 32.58 kg/m2 Former Smoker Vitals History BMI and BSA Data Body Mass Index Body Surface Area 32.58 kg/m 2 2.3 m 2 Preferred Pharmacy Pharmacy Name Phone [facility] - [address] [place] [facility], [address] [phone] Your Updated Medication List  
  
   
This list is accurate as of: 3/16/17 10:48 AM.  Always use your most recent med list.  
  
  
  
  
 acetaminophen 500 mg tablet Commonly known as:  [address] Take 2 Tabs by mouth every six (6) hours as needed for Pain. albuterol 90 mcg/actuation inhaler Commonly known as:  PROVENTIL HFA, VENTOLIN HFA, PROAIR HFA Take 2 Puffs by inhalation every six (6) hours as needed for Wheezing. aspirin 81 mg chewable tablet Take 1 Tab by mouth daily. Indications: MYOCARDIAL INFARCTION PREVENTION  
  
 atorvastatin 80 mg tablet Commonly known as:  LIPITOR Take 1 Tab by mouth nightly. benzonatate 100 mg capsule Commonly known as:  TESSALON  
 Take 1 Cap by mouth three (3) times daily as needed for Cough for up to 7 days. carvedilol 25 mg tablet Commonly known as:  Pandadamaris Faith Take 12.5 mg by mouth two (2) times daily (with meals). 1/2 tab BID  
  
 gabapentin 300 mg capsule Commonly known as:  NEURONTIN Take 1 Cap by mouth two (2) times a day. hydrALAZINE 25 mg tablet Commonly known as:  APRESOLINE Take 1 Tab by mouth three (3) times daily. hydroCHLOROthiazide 25 mg tablet Commonly known as:  HYDRODIURIL Take 1 Tab by mouth daily. LANTUS 100 unit/mL injection Generic drug:  insulin glargine 35 Units by SubCUTAneous route nightly. pt takes 30 Units  
  
 losartan 100 mg tablet Commonly known as:  COZAAR Take 1 Tab by mouth daily. mometasone 50 mcg/actuation nasal spray Commonly known as:  NASONEX  
2 Sprays by Both Nostrils route daily. NIFEdipine ER 60 mg ER tablet Commonly known as:  ADALAT CC Take 1 Tab by mouth daily. nitroglycerin 0.4 mg SL tablet Commonly known as:  NITROSTAT  
1 Tab by SubLINGual route every five (5) minutes as needed for Chest Pain.  
  
 potassium chloride 10 mEq tablet Commonly known as:  K-DUR, KLOR-CON Take 1 Tab by mouth daily. PROTONIX 40 mg tablet Generic drug:  pantoprazole Take 40 mg by mouth daily. rivaroxaban 20 mg Tab tablet Commonly known as:  Roma Seamen Take 1 Tab by mouth daily (with breakfast). traMADol 50 mg tablet Commonly known as:  ULTRAM  
Take 1 Tab by mouth every eight (8) hours as needed for Pain. Max Daily Amount: 150 mg.  
  
  
  
  
Prescriptions Sent to Pharmacy Refills  
 rivaroxaban (XARELTO) 20 mg tab tablet 2 Sig: Take 1 Tab by mouth daily (with breakfast). Class: Normal  
 [facility] [address] Ph #: [phone] Route: Oral  
 benzonatate (TESSALON) 100 mg capsule 1  Sig: Take 1 Cap by mouth three (3) times daily as needed for Cough for up to 7 days. Class: Normal  
 Pharmacy: [id number]  #: [phone] Route: Oral  
  
Follow-up Instructions Return in about 1 month (around 4/16/2017) for Medicare welllness. To-Do List   
 03/16/2017 Imaging:  DUPLEX UPPER EXT VENOUS LEFT Introducing [facility]! Dear Tyrone Joy: Thank you for requesting a [employer] account. Our records indicate that you already have an active [employer] account. You can access your account anytime at [email or web address] Did you know that you can access your hospital and ER discharge instructions at any time in [employer]? You can also review all of your test results from your hospital stay or ER visit. Additional Information If you have questions, please visit the Frequently Asked Questions section of the [employer] website at [email or web address] Remember, [employer] is NOT to be used for urgent needs. For medical emergencies, dial 911. Now available from your iPhone and Android! Please provide this summary of care documentation to your next provider. Your primary care clinician is listed as Jo Sweeney. If you have any questions after today's visit, please call [phone].

## 2017-03-24 ENCOUNTER — HOSPITAL ENCOUNTER (OUTPATIENT)
Dept: VASCULAR SURGERY | Age: 62
Discharge: HOME | End: 2017-03-24
Attending: INTERNAL MEDICINE
Payer: MEDICARE

## 2017-03-24 DIAGNOSIS — I82.622 DEEP VEIN THROMBOSIS (DVT) OF OTHER VEIN OF LEFT UPPER EXTREMITY: ICD-10-CM

## 2017-03-24 PROCEDURE — 93971 EXTREMITY STUDY: CPT

## 2017-03-24 NOTE — PROCEDURES
Guillermo  *** FINAL REPORT ***    Name: Herman Esqueda  MRN: [id number]    Outpatient  : 1955  HIS Order #: [id number]  [address] [facility] Visit #: [id number]  Date: 24 Mar 2017    TYPE OF TEST: Peripheral Venous Testing    REASON FOR TEST  H/O DVT, Follow-up LUE DVT    Left Arm:-  Deep venous thrombosis:           No  Superficial venous thrombosis:    No      INTERPRETATION/FINDINGS  Duplex images were obtained using 2-D gray scale, color flow, and  spectral Doppler analysis. Left arm :  1. Deep vein(s) visualized include the internal jugular, subclavian,  axillary, brachial, radial and ulnar veins. 2. No evidence of deep venous thrombosis detected in the veins  visualized. 3. No evidence of deep vein thrombosis in the contralateral subclavian   vein. 4. Superficial vein(s) visualized include the basilic (upper arm),  basilic (forearm), cephalic (upper arm) and cephalic (forearm) veins. 5. No evidence of superficial thrombosis detected. 6. Resolution of axillary deep vein thrombosis noted on previous  examination done on 16. ADDITIONAL COMMENTS    I have personally reviewed the data relevant to the interpretation of  this  study. TECHNOLOGIST: Roney Dandy. Melissa Mohr  Signed: 2017 11:19 AM    PHYSICIAN: Jose Chow MD  Signed: 2017 10:59 AM

## 2017-03-27 ENCOUNTER — TELEPHONE (OUTPATIENT)
Dept: FAMILY MEDICINE CLINIC | Age: 62
End: 2017-03-27

## 2017-03-27 NOTE — TELEPHONE ENCOUNTER
Spoke with pt, pt will stop medication. Advised pt not to take medication. This encounter will be closed.

## 2017-04-04 ENCOUNTER — HOSPITAL ENCOUNTER (OUTPATIENT)
Dept: GENERAL RADIOLOGY | Age: 62
Discharge: HOME | End: 2017-04-04
Payer: MEDICARE

## 2017-04-04 ENCOUNTER — OFFICE VISIT (OUTPATIENT)
Dept: FAMILY MEDICINE CLINIC | Age: 62
End: 2017-04-04

## 2017-04-04 VITALS
RESPIRATION RATE: 16 BRPM | DIASTOLIC BLOOD PRESSURE: 78 MMHG | WEIGHT: 237 LBS | HEART RATE: 81 BPM | OXYGEN SATURATION: 93 % | SYSTOLIC BLOOD PRESSURE: 115 MMHG | BODY MASS INDEX: 33.18 KG/M2 | HEIGHT: 71 IN | TEMPERATURE: 97.3 F

## 2017-04-04 DIAGNOSIS — R09.89 LUNG CRACKLES: ICD-10-CM

## 2017-04-04 DIAGNOSIS — R05.9 COUGH: ICD-10-CM

## 2017-04-04 DIAGNOSIS — Z11.59 NEED FOR HEPATITIS C SCREENING TEST: ICD-10-CM

## 2017-04-04 DIAGNOSIS — E11.40 TYPE 2 DIABETES MELLITUS WITH DIABETIC NEUROPATHY, WITH LONG-TERM CURRENT USE OF INSULIN (HCC): ICD-10-CM

## 2017-04-04 DIAGNOSIS — Z13.39 SCREENING FOR ALCOHOLISM: ICD-10-CM

## 2017-04-04 DIAGNOSIS — J44.9 CHRONIC OBSTRUCTIVE PULMONARY DISEASE, UNSPECIFIED COPD TYPE (HCC): ICD-10-CM

## 2017-04-04 DIAGNOSIS — Z79.4 TYPE 2 DIABETES MELLITUS WITH DIABETIC NEUROPATHY, WITH LONG-TERM CURRENT USE OF INSULIN (HCC): ICD-10-CM

## 2017-04-04 DIAGNOSIS — Z00.00 ROUTINE GENERAL MEDICAL EXAMINATION AT A HEALTH CARE FACILITY: ICD-10-CM

## 2017-04-04 DIAGNOSIS — J43.8 OTHER EMPHYSEMA (HCC): ICD-10-CM

## 2017-04-04 DIAGNOSIS — Z00.00 MEDICARE ANNUAL WELLNESS VISIT, SUBSEQUENT: Primary | ICD-10-CM

## 2017-04-04 DIAGNOSIS — Z71.89 ADVANCE CARE PLANNING: ICD-10-CM

## 2017-04-04 DIAGNOSIS — E78.00 HYPERCHOLESTEREMIA: ICD-10-CM

## 2017-04-04 PROCEDURE — 71020 XR CHEST PA LAT: CPT

## 2017-04-04 RX ORDER — BUDESONIDE AND FORMOTEROL FUMARATE DIHYDRATE 160; 4.5 UG/1; UG/1
2 AEROSOL RESPIRATORY (INHALATION) 2 TIMES DAILY
Qty: 1 INHALER | Refills: 3 | Status: SHIPPED | OUTPATIENT
Start: 2017-04-04 | End: 2018-03-01 | Stop reason: SDUPTHER

## 2017-04-04 RX ORDER — PREDNISONE 10 MG/1
TABLET ORAL
Qty: 21 TAB | Refills: 0 | Status: SHIPPED | OUTPATIENT
Start: 2017-04-04 | End: 2017-07-26

## 2017-04-04 RX ORDER — AZITHROMYCIN 250 MG/1
TABLET, FILM COATED ORAL
Qty: 6 TAB | Refills: 0 | Status: SHIPPED | OUTPATIENT
Start: 2017-04-04 | End: 2017-04-09

## 2017-04-04 RX ORDER — ALBUTEROL SULFATE 90 UG/1
2 AEROSOL, METERED RESPIRATORY (INHALATION)
Qty: 1 INHALER | Refills: 3 | Status: SHIPPED | OUTPATIENT
Start: 2017-04-04

## 2017-04-04 NOTE — MR AVS SNAPSHOT
Visit Information Date & Time Provider Department Dept. Phone Encounter #  
 4/4/2017  9:45 AM Armen Maynard [phone] [id number] Follow-up Instructions Return in 2 weeks (on 4/18/2017), or if symptoms worsen or fail to improve. Your Appointments 4/21/2017 10:30 AM  
Follow Up with Glynn Rodney NP [address] ([facility]) Appt Note: Per Pelon Pelt R/S due to other medical issues/ Colon screening; R/S appointment from 04- Reason: [facility] [address] [facility] [address] [facility] [address]  
  
    
 4/24/2017 11:00 AM  
Medicare Physical with Amberly Maria MD  
[facility] Appt Note: Return in 1 month for MWE  
 [address] [facility] [address]  
  
   
 [address]  
  
    
 4/26/2017  3:40 PM  
CARELINK with Roldan Holman [facility] ([facility]) Appt Note: 3 month f/u after January- [facility] [place] [phone]  
[phone] [place] [phone]  
  
    
 5/9/2017 11:00 AM  
Follow Up with Liz Patton MD  
[facility]) Appt Note: 6 m f/u after [facility] [address] [place] Upcoming Health Maintenance Date Due  
 EYE EXAM RETINAL OR DILATED Q1 1/29/1965 LIPID PANEL Q1 9/23/2017 HEMOGLOBIN A1C Q6M 10/4/2017 FOOT EXAM Q1 4/4/2018 MICROALBUMIN Q1 4/4/2018 MEDICARE YEARLY EXAM 4/5/2018 COLONOSCOPY 2/19/2025 DTaP/Tdap/Td series (2 - Td) 4/4/2027 Allergies as of 4/4/2017  Review Complete On: 4/4/2017 By: Keyana To LPN Severity Noted Reaction Type Reactions Lasix [Furosemide]  05/16/2016    Other (comments) Levofloxacin  05/25/2016    Rash Penicillins  11/28/2015    Swelling Current Immunizations  Reviewed on 10/20/2016 Name Date Influenza Vaccine (Quad) PF 10/20/2016 Pneumococcal Vaccine (Unspecified Type) 1/1/2013 Not reviewed this visit You Were Diagnosed With   
  
 Codes Comments Medicare annual wellness visit, subsequent    -  Primary ICD-10-CM: Z00.00 ICD-9-CM: V70.0 Advance care planning     ICD-10-CM: Z71.89 ICD-9-CM: V65.49 Type 2 diabetes mellitus with diabetic neuropathy, with long-term current use of insulin (HCC)     ICD-10-CM: E11.40, Z79.4 ICD-9-CM: 250.60, 357.2, V58.67 Hypercholesteremia     ICD-10-CM: E78.00 ICD-9-CM: 272.0 Need for hepatitis C screening test     ICD-10-CM: Z11.59 
ICD-9-CM: V73.89 Lung crackles     ICD-10-CM: R09.89 ICD-9-CM: 786.7 Other emphysema ([facility]     ICD-10-CM: J43.8 ICD-9-CM: 492.8 Cough     ICD-10-CM: R05 ICD-9-CM: 786.2 Routine general medical examination at a health care facility     ICD-10-CM: Z00.00 ICD-9-CM: V70.0 Screening for alcoholism     ICD-10-CM: Z13.89 ICD-9-CM: V79.1 Chronic obstructive pulmonary disease, unspecified COPD type ([facility]     ICD-10-CM: J44.9 ICD-9-CM: 373 Vitals BP Pulse Temp Resp Height(growth percentile) Weight(growth percentile) 115/78 (BP 1 Location: Right arm, BP Patient Position: Sitting) 81 97.3 °F (36.3 °C) (Oral) 16 5' 11\" (1.803 m) 237 lb (107.5 kg) SpO2 BMI Smoking Status 93% 33.05 kg/m2 Former Smoker Vitals History BMI and BSA Data Body Mass Index Body Surface Area 33.05 kg/m 2 2.32 m 2 Preferred Pharmacy Pharmacy Name Phone [facility] - [address], [address] [phone] Your Updated Medication List  
  
   
This list is accurate as of: 4/4/17 10:09 AM.  Always use your most recent med list.  
  
  
  
  
 acetaminophen 500 mg tablet Commonly known as:  [address]  Take 2 Tabs by mouth every six (6) hours as needed for Pain. albuterol 90 mcg/actuation inhaler Commonly known as:  PROVENTIL HFA, VENTOLIN HFA, PROAIR HFA Take 2 Puffs by inhalation every six (6) hours as needed for Wheezing. aspirin 81 mg chewable tablet Take 1 Tab by mouth daily. Indications: MYOCARDIAL INFARCTION PREVENTION  
  
 atorvastatin 80 mg tablet Commonly known as:  LIPITOR Take 1 Tab by mouth nightly. azithromycin 250 mg tablet Commonly known as:  Marky Shin Take 2 tablets today, then take 1 tablet daily  
  
 budesonide-formoterol 160-4.5 mcg/actuation HFA inhaler Commonly known as:  SYMBICORT Take 2 Puffs by inhalation two (2) times a day. carvedilol 25 mg tablet Commonly known as:  Tilda Short Take 12.5 mg by mouth two (2) times daily (with meals). 1/2 tab BID  
  
 gabapentin 300 mg capsule Commonly known as:  NEURONTIN Take 1 Cap by mouth two (2) times a day. hydrALAZINE 25 mg tablet Commonly known as:  APRESOLINE Take 1 Tab by mouth three (3) times daily. hydroCHLOROthiazide 25 mg tablet Commonly known as:  HYDRODIURIL Take 1 Tab by mouth daily. LANTUS 100 unit/mL injection Generic drug:  insulin glargine 35 Units by SubCUTAneous route nightly. pt takes 30 Units  
  
 losartan 100 mg tablet Commonly known as:  COZAAR Take 1 Tab by mouth daily. mometasone 50 mcg/actuation nasal spray Commonly known as:  NASONEX  
2 Sprays by Both Nostrils route daily. NIFEdipine ER 60 mg ER tablet Commonly known as:  ADALAT CC Take 1 Tab by mouth daily. nitroglycerin 0.4 mg SL tablet Commonly known as:  NITROSTAT  
1 Tab by SubLINGual route every five (5) minutes as needed for Chest Pain. potassium chloride 10 mEq tablet Commonly known as:  K-DUR, KLOR-CON Take 1 Tab by mouth daily. predniSONE 10 mg dose pack Commonly known as:  STERAPRED DS See administration instruction per 10mg dose pack PROTONIX 40 mg tablet Generic drug:  pantoprazole Take 40 mg by mouth daily. Prescriptions Sent to Pharmacy Refills  
 azithromycin (ZITHROMAX) 250 mg tablet 0 Sig: Take 2 tablets today, then take 1 tablet daily Class: Normal  
 Pharmacy: [address] [place] [address] Ph #: [phone]  
 albuterol (PROVENTIL HFA, VENTOLIN HFA, PROAIR HFA) 90 mcg/actuation inhaler 3 Sig: Take 2 Puffs by inhalation every six (6) hours as needed for Wheezing. Class: Normal  
 Pharmacy: [facility] [employer] [address] Ph #: [phone] Route: Inhalation  
 budesonide-formoterol (SYMBICORT) 160-4.5 mcg/actuation HFA inhaler 3 Sig: Take 2 Puffs by inhalation two (2) times a day. Class: Normal  
 Pharmacy: [facility] [employer] [address] [employer] [employer] Ph #: [phone] Route: Inhalation  
 predniSONE (STERAPRED DS) 10 mg dose pack 0 Sig: See administration instruction per 10mg dose pack Class: Normal  
 Pharmacy: [facility] [employer] [facility] Ph #: [phone] We Performed the Following ADVANCE CARE PLANNING FIRST 30 MINS [20198 CPT(R)] Follow-up Instructions Return in 2 weeks (on 4/18/2017), or if symptoms worsen or fail to improve. To-Do List   
 04/04/2017 Lab:  CBC WITH AUTOMATED DIFF   
  
 04/04/2017 Lab:  HEMOGLOBIN A1C WITH EAG   
  
 04/04/2017 Lab:  HEPATITIS C AB   
  
 04/04/2017 Lab:  LIPID PANEL   
  
 04/04/2017 Lab:  METABOLIC PANEL, COMPREHENSIVE   
  
 04/04/2017 Lab:  MICROALBUMIN, UR, RAND W/ MICROALBUMIN/CREA RATIO   
  
 04/04/2017 Lab:  PRO-BNP   
  
 04/04/2017 Imaging:  XR CHEST PA LAT Patient Instructions Medicare Wellness Visit, Male The best way to live healthy is to have a healthy lifestyle by eating a well-balanced diet, exercising regularly, limiting alcohol and stopping smoking. Regular physical exams and screening tests are another way to keep healthy. Preventive exams provided by your health care provider can find health problems before they become diseases or illnesses. Preventive services including immunizations, screening tests, monitoring and exams can help you take care of your own health. All people over age 72 should have a pneumovax  and and a prevnar shot to prevent pneumonia. These are once in a lifetime unless you and your provider decide differently. All people over 65 should have a yearly flu shot and a tetanus vaccine every 10 years. Screening for diabetes mellitus with a blood sugar test should be done every year. Glaucoma is a disease of the eye due to increased ocular pressure that can lead to blindness and it should be done every year by an eye professional. 
 
Cardiovascular screening tests that check for elevated lipids (fatty part of blood) which can lead to heart disease and strokes should be done every 5 years. Colorectal screening that evaluates for blood or polyps in your colon should be done yearly as a stool test or every five years as a flexible sigmoidoscope or every 10 years as a colonoscopy up to age 76. Men up to age 76 may need a screening blood test for prostate cancer at certain intervals, depending on their personal and family history. This decision is between the patient and his provider. If you have been a smoker or had family history of abdominal aortic aneurysms, you and your provider may decide to schedule an ultrasound test of your aorta. Hepatitis C screening is also recommended for anyone born between [birth year] through [address]. A shingles vaccine is also recommended once in a lifetime after age 61. Your Medicare Wellness Exam is recommended annually. Here is a list of your current Health Maintenance items with a due date: 
Health Maintenance Due Topic Date Due [employer] Eye Exam  01/29/1965 Introducing [facility] SERVICES! Dear Eugene Eldridge: Thank you for requesting a [employer] account. Our records indicate that you already have an active [employer] account. You can access your account anytime at [email or web address] Did you know that you can access your hospital and ER discharge instructions at any time in [employer]? You can also review all of your test results from your hospital stay or ER visit. Additional Information If you have questions, please visit the Frequently Asked Questions section of the [employer] website at [email or web address] Remember, [employer] is NOT to be used for urgent needs. For medical emergencies, dial 911. Now available from your iPhone and Android! Please provide this summary of care documentation to your next provider. Your primary care clinician is listed as Felice Cifuentes. If you have any questions after today's visit, please call [phone].

## 2017-04-04 NOTE — PATIENT INSTRUCTIONS

## 2017-04-04 NOTE — ACP (ADVANCE CARE PLANNING)
Advance Care Planning (ACP) Provider Note - Comprehensive     Date of ACP Conversation: 04/04/17  Persons included in Conversation:  patient  Length of ACP Conversation in minutes:  16 minutes    Authorized Decision Maker (if patient is incapable of making informed decisions): This person is:  sister          General ACP for ALL Patients with Decision Making Capacity:   Importance of advance care planning, including choosing a healthcare agent to communicate patient's healthcare decisions if patient lost the ability to make decisions, such as after a sudden illness or accident  Understanding of the healthcare agent role was assessed and information provided    Review of Existing Advance Directive:  does not have one    For Serious or Chronic Illness:  Understanding of medical condition    Understanding of CPR, goals and expected outcomes, benefits and burdens discussed.     Interventions Provided:  Recommended completion of Advance Directive form after review of ACP materials and conversation with prospective healthcare agent

## 2017-04-04 NOTE — PROGRESS NOTES
This is a Subsequent Medicare Annual Wellness Visit providing Personalized Prevention Plan Services (PPPS) (Performed 12 months after initial AWV and PPPS )    I have reviewed the patient's medical history in detail and updated the computerized patient record. History     Past Medical History:   Diagnosis Date    Biventricular ICD (implantable cardioverter-defibrillator) in place 07/16    Medtronic    CAD (coronary artery disease)     Cardiomyopathy, ischemic     EF 30% (04/16) 30-35% (11/15)    [facility]     DVT (deep venous thrombosis) ([facility] 08/16    L axillary vein after PPM insertion    HLD (hyperlipidemia)     Hypertension     NSTEMI (non-ST elevated myocardial infarction) ([facility])     S/P OM1 2.5 X 23 mm XIENCE (11/15)    Pulmonary emphysema ([facility]     Stroke [facility])       Past Surgical History:   Procedure Laterality Date    VASCULAR SURGERY PROCEDURE UNLIST      Stent placed right thigh     Current Outpatient Prescriptions   Medication Sig Dispense Refill    azithromycin (ZITHROMAX) 250 mg tablet Take 2 tablets today, then take 1 tablet daily 6 Tab 0    albuterol (PROVENTIL HFA, VENTOLIN HFA, PROAIR HFA) 90 mcg/actuation inhaler Take 2 Puffs by inhalation every six (6) hours as needed for Wheezing. 1 Inhaler 3    gabapentin (NEURONTIN) 300 mg capsule Take 1 Cap by mouth two (2) times a day. 60 Cap 3    potassium chloride (K-DUR, KLOR-CON) 10 mEq tablet Take 1 Tab by mouth daily. 30 Tab 3    acetaminophen (TYLENOL EXTRA STRENGTH) 500 mg tablet Take 2 Tabs by mouth every six (6) hours as needed for Pain. 50 Tab 0    mometasone (NASONEX) 50 mcg/actuation nasal spray 2 Sprays by Both Nostrils route daily. 2 Container 3    pantoprazole (PROTONIX) 40 mg tablet Take 40 mg by mouth daily.  carvedilol (COREG) 25 mg tablet Take 12.5 mg by mouth two (2) times daily (with meals).  1/2 tab BID      nitroglycerin (NITROSTAT) 0.4 mg SL tablet 1 Tab by SubLINGual route every five (5) minutes as needed for Chest Pain. 25 Tab 3    NIFEdipine ER (ADALAT CC) 60 mg ER tablet Take 1 Tab by mouth daily. 90 Tab 3    losartan (COZAAR) 100 mg tablet Take 1 Tab by mouth daily. 90 Tab 3    hydrochlorothiazide (HYDRODIURIL) 25 mg tablet Take 1 Tab by mouth daily. 90 Tab 3    aspirin 81 mg chewable tablet Take 1 Tab by mouth daily. Indications: MYOCARDIAL INFARCTION PREVENTION 100 Tab 5    atorvastatin (LIPITOR) 80 mg tablet Take 1 Tab by mouth nightly. 30 Tab 5    hydrALAZINE (APRESOLINE) 25 mg tablet Take 1 Tab by mouth three (3) times daily. 100 Tab 5    insulin glargine (LANTUS) 100 unit/mL injection 35 Units by SubCUTAneous route nightly. pt takes 30 Units        Allergies   Allergen Reactions    Lasix [Furosemide] Other (comments)    Levofloxacin Rash    Penicillins Swelling     Family History   Problem Relation Age of Onset    Heart Disease Mother     Heart Disease Father      Social History   Substance Use Topics    Smoking status: Former Smoker     Packs/day: 1.50     Years: 30.00     Types: Cigarettes     Quit date: 11/13/2015    Smokeless tobacco: Never Used    Alcohol use No     Patient Active Problem List   Diagnosis Code    Hypertension I10    Type II or unspecified type diabetes mellitus without mention of complication, not stated as uncontrolled E11.9    Acute lacunar stroke ([facility] I63.9    HLD (hyperlipidemia) E78.5    ACS [facility] syndrome[facility] I24.9    Elevated troponin R74.8    Hearing impairment H91.90    Coronary artery disease involving native coronary artery without angina pectoris I25.10    AICD (automatic cardioverter/defibrillator) present Z95.810    Emphysema of lung (HCC) J43.9    Ex-smoker Z87.891    Restrictive ventilatory defect R94.2    Obesity (BMI 30-39. 9) E66.9    Chronic combined systolic and diastolic heart failure (HCC) I50.42    Deep venous thrombosis (HCC) I82.409    Hypercholesteremia E78.00    Type 2 diabetes mellitus with diabetic neuropathy, with long-term current use of insulin ([facility]) E11.40, Z79.4    Deep vein thrombosis (DVT) of left upper extremity ([facility]) I82.622    PAD (peripheral artery disease) ([facility]) I73.9       Depression Risk Factor Screening:     PHQ 2 / 9, over the last two weeks 2/1/2017   Little interest or pleasure in doing things Not at all   Feeling down, depressed or hopeless Not at all   Total Score PHQ 2 0     Alcohol Risk Factor Screening: On any occasion during the past 3 months, have you had more than 4 drinks containing alcohol? No    Do you average more than 14 drinks per week? No      Functional Ability and Level of Safety:     Hearing Loss   severe    Activities of Daily Living   Self-care. Requires assistance with: no ADLs    Fall Risk   No flowsheet data found. Abuse Screen   Patient is not abused    Review of Systems   A comprehensive review of systems was negative except for: Respiratory: positive for cough    Physical Examination     Evaluation of Cognitive Function:  Mood/affect:  neutral  Appearance: age appropriate  Family member/caregiver input: none    Visit Vitals    /78 (BP 1 Location: Right arm, BP Patient Position: Sitting)    Pulse 81    Temp 97.3 °F (36.3 °C) (Oral)    Resp 16    Ht 5' 11\" (1.803 m)    Wt 237 lb (107.5 kg)    SpO2 93%    BMI 33.05 kg/m2     General:  Alert, cooperative, no distress, appears stated age. Head:  Normocephalic, without obvious abnormality, atraumatic. Eyes:  Conjunctivae/corneas clear. PERRL, EOMs intact. Fundi benign   Ears:  Normal TMs and external ear canals both ears. Nose: Nares normal. Septum midline. Mucosa normal. No drainage or sinus tenderness. Throat: Lips, mucosa, and tongue normal. Teeth and gums normal.   Neck: Supple, symmetrical, trachea midline, no adenopathy, thyroid: no enlargement/tenderness/nodules, no carotid bruit and no JVD. Back:   Symmetric, no curvature. ROM normal. No CVA tenderness.    Lungs:   Clear to auscultation bilaterally,crackles at the bases   Chest wall:  No tenderness or deformity. Heart:  Regular rate and rhythm, S1, S2 normal, no murmur, click, rub or gallop. Abdomen:   Soft, non-tender. Bowel sounds normal. No masses,  No organomegaly. Genitalia:  Normal male without lesion, discharge or tenderness. Extremities: Extremities normal, atraumatic, no cyanosis or edema. Pulses: 2+ and symmetric all extremities. Skin: Skin color, texture, turgor normal. No rashes or lesions   Lymph nodes: Cervical, supraclavicular, and axillary nodes normal.   Neurologic: CNII-XII intact. Normal strength, sensation and reflexes throughout. Diabetic foot exam:     Left: Reflexes 2+     Vibratory sensation normal    Proprioception normal   Sharp/dull discrimination normal    Filament test reduced sensation with micro filament   Pulse DP: absent   Pulse PT: absent   Deformities: Yes - Hallux, nail dystrophy  Right: Reflexes 2+   Vibratory sensation normal   Proprioception normal   Sharp/dull discrimination normal   Filament test reduced sensation with micro filament   Pulse DP: absent   Pulse PT: absent   Deformities: Yes - hallux, nail dystrophy       Patient Care Team:  Eliza Wang MD as PCP - General (Internal Medicine)  Kristopher Alonso MD (Cardiology)  Antelmo Beltran NP (Nurse Practitioner)    Advice/Referrals/Counseling   Education and counseling provided:  Are appropriate based on today's review and evaluation      Assessment/Plan   the following changes in treatment are made: cough- CXr , blood work. Chronic condition  . Emmett Baptiste is a 60 y.o.  male and presents with     Chief Complaint   Patient presents with    Cough    COPD    Diabetes     Pt has been having cough for a week or so. H e says he was exposed to Asbestos and wondersif that is causing the cough. Has mild SOB. Pt is not sure about his blood sugars. Pt informs he sees vascular for his PAD.  Today they are not jamie. Past Medical History:   Diagnosis Date    Biventricular ICD (implantable cardioverter-defibrillator) in place 07/16    Medtronic    CAD (coronary artery disease)     Cardiomyopathy, ischemic     EF 30% (04/16) 30-35% (11/15)    Diabetes ([facility]     DVT (deep venous thrombosis) ([facility] 08/16    L axillary vein after PPM insertion    HLD (hyperlipidemia)     Hypertension     NSTEMI (non-ST elevated myocardial infarction) ([facility]     S/P OM1 2.5 X 23 mm XIENCE (11/15)    Pulmonary emphysema ([facility]     Stroke ([facility]      Past Surgical History:   Procedure Laterality Date    HX PACEMAKER PLACEMENT      VASCULAR SURGERY PROCEDURE UNLIST      Stent placed right thigh     Current Outpatient Prescriptions   Medication Sig    albuterol (PROVENTIL HFA, VENTOLIN HFA, PROAIR HFA) 90 mcg/actuation inhaler Take 2 Puffs by inhalation every six (6) hours as needed for Wheezing.  budesonide-formoterol (SYMBICORT) 160-4.5 mcg/actuation HFA inhaler Take 2 Puffs by inhalation two (2) times a day.  predniSONE (STERAPRED DS) 10 mg dose pack See administration instruction per 10mg dose pack    gabapentin (NEURONTIN) 300 mg capsule Take 1 Cap by mouth two (2) times a day.  potassium chloride (K-DUR, KLOR-CON) 10 mEq tablet Take 1 Tab by mouth daily.  acetaminophen (TYLENOL EXTRA STRENGTH) 500 mg tablet Take 2 Tabs by mouth every six (6) hours as needed for Pain.  mometasone (NASONEX) 50 mcg/actuation nasal spray 2 Sprays by Both Nostrils route daily.  pantoprazole (PROTONIX) 40 mg tablet Take 40 mg by mouth daily.  carvedilol (COREG) 25 mg tablet Take 12.5 mg by mouth two (2) times daily (with meals). 1/2 tab BID    nitroglycerin (NITROSTAT) 0.4 mg SL tablet 1 Tab by SubLINGual route every five (5) minutes as needed for Chest Pain.  NIFEdipine ER (ADALAT CC) 60 mg ER tablet Take 1 Tab by mouth daily.  losartan (COZAAR) 100 mg tablet Take 1 Tab by mouth daily.     hydrochlorothiazide (HYDRODIURIL) 25 mg tablet Take 1 Tab by mouth daily.  aspirin 81 mg chewable tablet Take 1 Tab by mouth daily. Indications: MYOCARDIAL INFARCTION PREVENTION    atorvastatin (LIPITOR) 80 mg tablet Take 1 Tab by mouth nightly.  hydrALAZINE (APRESOLINE) 25 mg tablet Take 1 Tab by mouth three (3) times daily.  insulin glargine (LANTUS) 100 unit/mL injection 35 Units by SubCUTAneous route nightly. pt takes 30 Units     furosemide (LASIX) 20 mg tablet     PEG 3350-Electrolytes (COLYTE) 240-22.72-6.72 -5.84 gram solution Take 4 L by mouth now for 1 dose. Take as directed     No current facility-administered medications for this visit. Health Maintenance   Topic Date Due    EYE EXAM RETINAL OR DILATED Q1  01/29/1965    HEMOGLOBIN A1C Q6M  10/04/2017    FOOT EXAM Q1  04/04/2018    MICROALBUMIN Q1  04/04/2018    LIPID PANEL Q1  04/04/2018    MEDICARE YEARLY EXAM  04/05/2018    COLONOSCOPY  02/19/2025    DTaP/Tdap/Td series (2 - Td) 04/04/2027    Hepatitis C Screening  Completed    ZOSTER VACCINE AGE 60>  Completed    Pneumococcal 19-64 Medium Risk  Completed    INFLUENZA AGE 9 TO ADULT  Completed     Immunization History   Administered Date(s) Administered    Influenza Vaccine (Quad) PF 10/20/2016    Pneumococcal Vaccine (Unspecified Type) 01/01/2013     No LMP for male patient. Allergies and Intolerances: Allergies   Allergen Reactions    Lasix [Furosemide] Other (comments)    Levofloxacin Rash    Penicillins Swelling       Family History:   Family History   Problem Relation Age of Onset    Heart Disease Mother     Heart Disease Father        Social History:   He  reports that he quit smoking about 17 months ago. His smoking use included Cigarettes. He has a 45.00 pack-year smoking history. He has never used smokeless tobacco.  He  reports that he does not drink alcohol.             Review of Systems:   General: negative for - chills, fatigue, fever, weight change  Psych: negative for - anxiety, depression, irritability or mood swings  ENT: negative for - headaches, hearing change, nasal congestion, oral lesions, sneezing or sore throat  Heme/ Lymph: negative for - bleeding problems, bruising, pallor or swollen lymph nodes  Endo: negative for - hot flashes, polydipsia/polyuria or temperature intolerance  Resp: positive for - cough, shortness of breath or wheezing  CV: negative for - chest pain, edema or palpitations  GI: negative for - abdominal pain, change in bowel habits, constipation, diarrhea or nausea/vomiting  : negative for - dysuria, hematuria, incontinence, pelvic pain or vulvar/vaginal symptoms  MSK: negative for - joint pain, joint swelling or muscle pain  Neuro: negative for - confusion, headaches, seizures or weakness  Derm: negative for - dry skin, hair changes, rash or skin lesion changes          Physical:   Vitals:   Vitals:    04/04/17 0926   BP: 115/78   Pulse: 81   Resp: 16   Temp: 97.3 °F (36.3 °C)   TempSrc: Oral   SpO2: 93%   Weight: 237 lb (107.5 kg)   Height: 5' 11\" (1.803 m)           Exam:   HEENT- atraumatic,normocephalic, awake, oriented, well nourished  Neck - supple,no enlarged lymph nodes, no JVD, no thyromegaly  Chest- CTA, no rhonchi, minimal  Crackles at the base  Heart- rrr, no murmurs / gallop/rub  Abdomen- soft,BS+,NT, no hepatosplenomegaly  Ext - no c/c/edema   Neuro- no focal deficits. Power 5/5 all extremities  Skin - warm,dry, no obvious rashes.           Review of Data:   LABS:   Lab Results   Component Value Date/Time    WBC 6.8 04/04/2017 10:10 AM    HGB 13.8 04/04/2017 10:10 AM    HCT 43.9 04/04/2017 10:10 AM    PLATELET 370 45/43/4374 10:10 AM     Lab Results   Component Value Date/Time    Sodium 140 04/04/2017 10:10 AM    Potassium 3.8 04/04/2017 10:10 AM    Chloride 98 04/04/2017 10:10 AM    CO2 23 04/04/2017 10:10 AM    Glucose 117 04/04/2017 10:10 AM    BUN 23 04/04/2017 10:10 AM    Creatinine 1.09 04/04/2017 10:10 AM     Lab Results Component Value Date/Time    Cholesterol, total 145 04/04/2017 10:10 AM    HDL Cholesterol 59 04/04/2017 10:10 AM    LDL, calculated 65 04/04/2017 10:10 AM    Triglyceride 104 04/04/2017 10:10 AM     No results found for: GPT        Impression / Plan:      COPD - add symbicort, h/o exposure to asbestos    PAD - follow up with vascular    DM - check A1c    CMP - s/p defibrfillator    LEft upper ext DVT - resolved, Xarelto stopped. Nail dystrophy - , hallux, - pt sees Podiatry at the [place]. Cough - do CXR, proBNP, add zpack, prednisone,add symbicort for copd    Explained to patient risk benefits of the medications. Advised patient to stop meds if having any side effects. Pt verbalized understanding of the instructions. I have discussed the diagnosis with the patient and the intended plan as seen in the above orders. The patient has received an after-visit summary and questions were answered concerning future plans. I have discussed medication side effects and warnings with the patient as well. I have reviewed the plan of care with the patient, accepted their input and they are in agreement with the treatment goals. Reviewed plan of care. Patient has provided input and agrees with goals. Follow-up Disposition:  Return in 2 weeks (on 4/18/2017), or if symptoms worsen or fail to improve.     Yohan Powers MD

## 2017-04-04 NOTE — PROGRESS NOTES
1. Have you been to the ER, urgent care clinic since your last visit? Hospitalized since your last visit? No    2. Have you seen or consulted any other health care providers outside of the [address] [place] [address] since your last visit? Include any pap smears or colon screening.  No

## 2017-04-05 LAB
ALBUMIN SERPL-MCNC: 4 G/DL (ref 3.6–4.8)
ALBUMIN/CREAT UR: 2.9 MG/G CREAT (ref 0–30)
ALBUMIN/GLOB SERPL: 1.3 {RATIO} (ref 1.2–2.2)
ALP SERPL-CCNC: 96 IU/L (ref 39–117)
ALT SERPL-CCNC: 20 IU/L (ref 0–44)
AST SERPL-CCNC: 25 IU/L (ref 0–40)
BASOPHILS # BLD AUTO: 0 X10E3/UL (ref 0–0.2)
BASOPHILS NFR BLD AUTO: 0 %
BILIRUB SERPL-MCNC: 0.4 MG/DL (ref 0–1.2)
BUN SERPL-MCNC: 23 MG/DL (ref 8–27)
BUN/CREAT SERPL: 21 (ref 10–24)
CALCIUM SERPL-MCNC: 9.6 MG/DL (ref 8.6–10.2)
CHLORIDE SERPL-SCNC: 98 MMOL/L (ref 96–106)
CHOLEST SERPL-MCNC: 145 MG/DL (ref 100–199)
CO2 SERPL-SCNC: 23 MMOL/L (ref 18–29)
CREAT SERPL-MCNC: 1.09 MG/DL (ref 0.76–1.27)
CREAT UR-MCNC: 103 MG/DL
EOSINOPHIL # BLD AUTO: 0.8 X10E3/UL (ref 0–0.4)
EOSINOPHIL NFR BLD AUTO: 12 %
ERYTHROCYTE [DISTWIDTH] IN BLOOD BY AUTOMATED COUNT: 15.3 % (ref 12.3–15.4)
EST. AVERAGE GLUCOSE BLD GHB EST-MCNC: 131 MG/DL
GLOBULIN SER CALC-MCNC: 3 G/DL (ref 1.5–4.5)
GLUCOSE SERPL-MCNC: 117 MG/DL (ref 65–99)
HBA1C MFR BLD: 6.2 % (ref 4.8–5.6)
HCT VFR BLD AUTO: 43.9 % (ref 37.5–51)
HCV AB S/CO SERPL IA: <0.1 S/CO RATIO (ref 0–0.9)
HDLC SERPL-MCNC: 59 MG/DL
HGB BLD-MCNC: 13.8 G/DL (ref 12.6–17.7)
IMM GRANULOCYTES # BLD: 0 X10E3/UL (ref 0–0.1)
IMM GRANULOCYTES NFR BLD: 0 %
INTERPRETATION, 910389: NORMAL
LDLC SERPL CALC-MCNC: 65 MG/DL (ref 0–99)
LYMPHOCYTES # BLD AUTO: 2.5 X10E3/UL (ref 0.7–3.1)
LYMPHOCYTES NFR BLD AUTO: 37 %
Lab: NORMAL
MCH RBC QN AUTO: 26.3 PG (ref 26.6–33)
MCHC RBC AUTO-ENTMCNC: 31.4 G/DL (ref 31.5–35.7)
MCV RBC AUTO: 84 FL (ref 79–97)
MICROALBUMIN UR-MCNC: 3 UG/ML
MONOCYTES # BLD AUTO: 0.7 X10E3/UL (ref 0.1–0.9)
MONOCYTES NFR BLD AUTO: 10 %
NEUTROPHILS # BLD AUTO: 2.8 X10E3/UL (ref 1.4–7)
NEUTROPHILS NFR BLD AUTO: 41 %
NT-PROBNP SERPL-MCNC: 364 PG/ML (ref 0–210)
PLATELET # BLD AUTO: 159 X10E3/UL (ref 150–379)
POTASSIUM SERPL-SCNC: 3.8 MMOL/L (ref 3.5–5.2)
PROT SERPL-MCNC: 7 G/DL (ref 6–8.5)
RBC # BLD AUTO: 5.25 X10E6/UL (ref 4.14–5.8)
SODIUM SERPL-SCNC: 140 MMOL/L (ref 134–144)
TRIGL SERPL-MCNC: 104 MG/DL (ref 0–149)
VLDLC SERPL CALC-MCNC: 21 MG/DL (ref 5–40)
WBC # BLD AUTO: 6.8 X10E3/UL (ref 3.4–10.8)

## 2017-04-11 ENCOUNTER — TELEPHONE (OUTPATIENT)
Dept: CARDIOLOGY CLINIC | Age: 62
End: 2017-04-11

## 2017-04-11 NOTE — TELEPHONE ENCOUNTER
Left message for courtesy four week reminder, patient has appointment with Dr. Kevin Elizabeth on Tuesday May 9, 2017 at 11 am.

## 2017-04-21 ENCOUNTER — OFFICE VISIT (OUTPATIENT)
Dept: SURGERY | Age: 62
End: 2017-04-21

## 2017-04-21 VITALS
DIASTOLIC BLOOD PRESSURE: 87 MMHG | BODY MASS INDEX: 32.9 KG/M2 | TEMPERATURE: 97.7 F | SYSTOLIC BLOOD PRESSURE: 138 MMHG | OXYGEN SATURATION: 94 % | HEART RATE: 78 BPM | WEIGHT: 235 LBS | RESPIRATION RATE: 18 BRPM | HEIGHT: 71 IN

## 2017-04-21 DIAGNOSIS — Z80.0 FAMILY HX OF COLON CANCER REQUIRING SCREENING COLONOSCOPY: Primary | ICD-10-CM

## 2017-04-21 RX ORDER — POLYETHYLENE GLYCOL 3350, SODIUM CHLORIDE, POTASSIUM CHLORIDE, SODIUM BICARBONATE, AND SODIUM SULFATE 240; 5.84; 2.98; 6.72; 22.72 G/4L; G/4L; G/4L; G/4L; G/4L
4 POWDER, FOR SOLUTION ORAL
Qty: 4 L | Refills: 0 | Status: SHIPPED | OUTPATIENT
Start: 2017-04-21 | End: 2017-04-21

## 2017-04-21 RX ORDER — FUROSEMIDE 20 MG/1
TABLET ORAL
COMMUNITY
Start: 2017-03-24 | End: 2017-10-26

## 2017-04-21 NOTE — MR AVS SNAPSHOT
Visit Information Date & Time Provider Department Dept. Phone Encounter #  
 4/21/2017 10:30 AM NELA Campos [facility] [phone] [id number] Your Appointments 4/24/2017 11:00 AM  
Medicare Physical with Lazarus Ghazi, MD  
[facility] Appt Note: Return in 1 month for MWE  
 [address]  
  
   
 [address] [facility]  
  
    
 4/26/2017  3:40 PM  
CARELINK with Roldan Holman [facility] Appt Note: 3 month f/u after January- [facility] [place] [address] [address] [address] [phone] [place] [phone]  
  
    
 5/9/2017 11:00 AM  
Follow Up with Lorie Man MD  
Cardio Specialist at [facility] [facility]) Appt Note: 6 m f/u after ECHO  
 [address]  
  
   
 [address] Upcoming Health Maintenance Date Due  
 EYE EXAM RETINAL OR DILATED Q1 1/29/1965 HEMOGLOBIN A1C Q6M 10/4/2017 FOOT EXAM Q1 4/4/2018 MICROALBUMIN Q1 4/4/2018 LIPID PANEL Q1 4/4/2018 MEDICARE YEARLY EXAM 4/5/2018 COLONOSCOPY 2/19/2025 DTaP/Tdap/Td series (2 - Td) 4/4/2027 Allergies as of 4/21/2017  Review Complete On: 4/21/2017 By: Vishal Sheridan NP Severity Noted Reaction Type Reactions Lasix [Furosemide]  05/16/2016    Other (comments) Levofloxacin  05/25/2016    Rash Penicillins  11/28/2015    Swelling Current Immunizations  Reviewed on 10/20/2016 Name Date Influenza Vaccine (Quad) PF 10/20/2016 Pneumococcal Vaccine (Unspecified Type) 1/1/2013 Not reviewed this visit You Were Diagnosed With   
  
 Codes Comments Family hx of colon cancer requiring screening colonoscopy    -  Primary ICD-10-CM: Z80.0 ICD-9-CM: V16.0 Vitals BP Pulse Temp Resp Height(growth percentile) Weight(growth percentile) 138/87 78 97.7 °F (36.5 °C) (Oral) 18 5' 11\" (1.803 m) 235 lb (106.6 kg) SpO2 BMI Smoking Status 94% 32.78 kg/m2 Former Smoker BMI and BSA Data Body Mass Index Body Surface Area 32.78 kg/m 2 2.31 m 2 Preferred Pharmacy Pharmacy Name Phone [facility] - [address], [address] [phone] Your Updated Medication List  
  
   
This list is accurate as of: 4/21/17 11:11 AM.  Always use your most recent med list.  
  
  
  
  
 acetaminophen 500 mg tablet Commonly known as:  [address] Take 2 Tabs by mouth every six (6) hours as needed for Pain. albuterol 90 mcg/actuation inhaler Commonly known as:  PROVENTIL HFA, VENTOLIN HFA, PROAIR HFA Take 2 Puffs by inhalation every six (6) hours as needed for Wheezing. aspirin 81 mg chewable tablet Take 1 Tab by mouth daily. Indications: MYOCARDIAL INFARCTION PREVENTION  
  
 atorvastatin 80 mg tablet Commonly known as:  LIPITOR Take 1 Tab by mouth nightly. budesonide-formoterol 160-4.5 mcg/actuation HFA inhaler Commonly known as:  SYMBICORT Take 2 Puffs by inhalation two (2) times a day. carvedilol 25 mg tablet Commonly known as:  Candi Henrietta Take 12.5 mg by mouth two (2) times daily (with meals). 1/2 tab BID  
  
 furosemide 20 mg tablet Commonly known as:  LASIX  
  
 gabapentin 300 mg capsule Commonly known as:  NEURONTIN Take 1 Cap by mouth two (2) times a day. hydrALAZINE 25 mg tablet Commonly known as:  APRESOLINE Take 1 Tab by mouth three (3) times daily. hydroCHLOROthiazide 25 mg tablet Commonly known as:  HYDRODIURIL Take 1 Tab by mouth daily. LANTUS 100 unit/mL injection Generic drug:  insulin glargine 35 Units by SubCUTAneous route nightly. pt takes 30 Units  
  
 losartan 100 mg tablet Commonly known as:  COZAAR Take 1 Tab by mouth daily. mometasone 50 mcg/actuation nasal spray Commonly known as:  NASONEX  
2 Sprays by Both Nostrils route daily. NIFEdipine ER 60 mg ER tablet Commonly known as:  ADALAT CC Take 1 Tab by mouth daily. nitroglycerin 0.4 mg SL tablet Commonly known as:  NITROSTAT  
1 Tab by SubLINGual route every five (5) minutes as needed for Chest Pain. PEG 3350-Electrolytes 240-22.72-6.72 -5.84 gram solution Commonly known as:  COLYTE Take 4 L by mouth now for 1 dose. Take as directed  
  
 potassium chloride 10 mEq tablet Commonly known as:  K-DUR, KLOR-CON Take 1 Tab by mouth daily. predniSONE 10 mg dose pack Commonly known as:  STERAPRED DS See administration instruction per 10mg dose pack PROTONIX 40 mg tablet Generic drug:  pantoprazole Take 40 mg by mouth daily. Prescriptions Sent to Pharmacy Refills PEG 3350-Electrolytes (COLYTE) 240-22.72-6.72 -5.84 gram solution 0 Sig: Take 4 L by mouth now for 1 dose. Take as directed Class: Normal  
 Pharmacy: [id number] [facility] Ph #: [phone] Route: Oral  
  
Patient Instructions If you have any questions or concerns about today's appointment, the verbal and/or written instructions you were given for follow up care, please call our office at [phone]. [facility] [address] [place] [address], [address] 
 
[phone] office [phone]! [address]: Thank you for requesting a rSmart account. Our records indicate that you already have an active rSmart account. You can access your account anytime at [email or web address] Did you know that you can access your hospital and ER discharge instructions at any time in [employer]? You can also review all of your test results from your hospital stay or ER visit. Additional Information If you have questions, please visit the Frequently Asked Questions section of the [employer] website at [email or web address] Remember, [employer] is NOT to be used for urgent needs. For medical emergencies, dial 911. Now available from your iPhone and Android! Please provide this summary of care documentation to your next provider. Your primary care clinician is listed as Josue Harley. If you have any questions after today's visit, please call [phone].

## 2017-04-21 NOTE — PROGRESS NOTES
Patient has a BM 1  x per day. BM consistency is formed. Patient denies bleeding with BM. Patient reports no pain with BM.      He had a colonoscopy through the [place] in the past.

## 2017-04-21 NOTE — PROGRESS NOTES
[address]  [address] [address] [place] [address]  [phone]    Colonoscopy History and Physical    Patient: Jackie Arellano MRN: [id number]  SSN: [id number]    YOB: 1955  Age: 60 y.o. Sex: male      Subjective:      Jackie Arellano is a 60 y.o. male who was referred by Dr. Puneet You for colon screening. His brother  from colon cancer when he was 80 yrs old. He states he had a colon screening through the [place] but doesn't remember when. The patient denies any rectal bleeding, change in bowel habits, weight changes, nor any abdominal pain. He denies constipation, vomiting, diarrhea, bloody stools, mucousy stools, difficulty swallowing, loss of appetite, reflux and nausea. Past Medical History:   Diagnosis Date    Biventricular ICD (implantable cardioverter-defibrillator) in place     Medtronic    CAD (coronary artery disease)     Cardiomyopathy, ischemic     EF 30% () 30-35% (11/15)    [facility]     DVT (deep venous thrombosis) ([facility]     L axillary vein after PPM insertion    HLD (hyperlipidemia)     Hypertension     NSTEMI (non-ST elevated myocardial infarction) ([facility])     S/P OM1 2.5 X 23 mm XIENCE (11/15)    Pulmonary emphysema ([facility])     Stroke [facility])      Past Surgical History:   Procedure Laterality Date    HX PACEMAKER PLACEMENT      VASCULAR SURGERY PROCEDURE UNLIST      Stent placed right thigh      Family History   Problem Relation Age of Onset    Heart Disease Mother     Heart Disease Father      Social History   Substance Use Topics    Smoking status: Former Smoker     Packs/day: 1.50     Years: 30.00     Types: Cigarettes     Quit date: 2015    Smokeless tobacco: Never Used    Alcohol use No      Prior to Admission medications    Medication Sig Start Date End Date Taking?  Authorizing Provider   furosemide (LASIX) 20 mg tablet  3/24/17  Yes Historical Provider   PEG 3350-Electrolytes (COLYTE) 240-22.72-6.72 -5.84 gram solution Take 4 L by mouth now for 1 dose. Take as directed 4/21/17 4/21/17 Yes Moni Vazquez NP   albuterol (PROVENTIL HFA, VENTOLIN HFA, PROAIR HFA) 90 mcg/actuation inhaler Take 2 Puffs by inhalation every six (6) hours as needed for Wheezing. 4/4/17  Yes [id number] MD   budesonide-formoterol (SYMBICORT) 160-4.5 mcg/actuation HFA inhaler Take 2 Puffs by inhalation two (2) times a day. 4/4/17  Yes [id number] We[address] MD   gabapentin (NEURONTIN) 300 mg capsule Take 1 Cap by mouth two (2) times a day. 1/4/17  Yes [address] Weinberg MD   potassium chloride (K-DUR, KLOR-CON) 10 mEq tablet Take 1 Tab by mouth daily. 1/4/17  Yes [id number] Weinberg MD   acetaminophen (TYLENOL EXTRA STRENGTH) 500 mg tablet Take 2 Tabs by mouth every six (6) hours as needed for Pain. 12/8/16  Yes Raj Vincent MD   mometasone (NASONEX) 50 mcg/actuation nasal spray 2 Sprays by Both Nostrils route daily. 11/16/16  Yes [id number] We[address] MD   pantoprazole (PROTONIX) 40 mg tablet Take 40 mg by mouth daily. Yes Historical Provider   carvedilol (COREG) 25 mg tablet Take 12.5 mg by mouth two (2) times daily (with meals). 1/2 tab BID   Yes Historical Provider   NIFEdipine ER (ADALAT CC) 60 mg ER tablet Take 1 Tab by mouth daily. 1/14/16  Yes Luis Johansen MD   losartan (COZAAR) 100 mg tablet Take 1 Tab by mouth daily. 12/29/15  Yes Luis Johansen MD   hydrochlorothiazide (HYDRODIURIL) 25 mg tablet Take 1 Tab by mouth daily. 12/29/15  Yes Georgina Victor MD   aspirin 81 mg chewable tablet Take 1 Tab by mouth daily. Indications: MYOCARDIAL INFARCTION PREVENTION 11/30/15  Yes Kenneth Goss MD   atorvastatin (LIPITOR) 80 mg tablet Take 1 Tab by mouth nightly. 11/30/15  Yes Kenneth Goss MD   hydrALAZINE (APRESOLINE) 25 mg tablet Take 1 Tab by mouth three (3) times daily.  11/30/15  Yes Kenneth Goss MD   predniSONE (STERAPRED DS) 10 mg dose pack See administration instruction per 10mg dose pack 4/4/17   Eliza Wang MD   nitroglycerin (NITROSTAT) 0.4 mg SL tablet 1 Tab by SubLINGual route every five (5) minutes as needed for Chest Pain. 3/25/16   Yinka Cohen MD   insulin glargine (LANTUS) 100 unit/mL injection 35 Units by SubCUTAneous route nightly. pt takes 30 Units     Historical Provider        Allergies   Allergen Reactions    Lasix [Furosemide] Other (comments)    Levofloxacin Rash    Penicillins Swelling       Review of Systems:  Review of systems performed with findings as noted. Objective:     Vitals:    04/21/17 1042   BP: 138/87   Pulse: 78   Resp: 18   Temp: 97.7 °F (36.5 °C)   TempSrc: Oral   SpO2: 94%   Weight: 106.6 kg (235 lb)   Height: 5' 11\" (1.803 m)        Physical Exam:  GENERAL: alert, cooperative, no distress, appears stated age  LUNG: clear to auscultation bilaterally  HEART: regular rate and rhythm, S1, S2 normal, no murmur, click, rub or gallop  ABDOMEN: soft, non-tender. Bowel sounds normal. No masses,  no organomegaly  NEUROLOGIC: negative  PSYCHIATRIC: non focal    Assessment:   Emmett Baptiste is a 60 y.o. male who presents for colonoscopy for   Family history of coloretal cancer (screening only)    Plan:   1. I recommend proceeding with colonoscopy. The patient was in full agreement and was eager to proceed. 2. I discussed the details of the colonoscopy procedure. The risks of colonoscopy were discussed including colon injury/perforation, anesthesia issues, bleeding, and the possibility of incomplete examination. The patient was willing to accept these risks and proceed with the examination. All questions were answered to the patient's satisfaction. 3. The patient was provided with the instructions in preparation for the colonoscopy procedure including the bowel prep recommendations.                Signed By: Antelmo Beltran NP     April 21, 2017

## 2017-04-21 NOTE — PATIENT INSTRUCTIONS
If you have any questions or concerns about today's appointment, the verbal and/or written instructions you were given for follow up care, please call our office at [phone].     [facility] - [address]    [phone] office  [phone]

## 2017-04-24 ENCOUNTER — OFFICE VISIT (OUTPATIENT)
Dept: FAMILY MEDICINE CLINIC | Age: 62
End: 2017-04-24

## 2017-04-24 VITALS
BODY MASS INDEX: 32.62 KG/M2 | SYSTOLIC BLOOD PRESSURE: 141 MMHG | DIASTOLIC BLOOD PRESSURE: 83 MMHG | WEIGHT: 233 LBS | OXYGEN SATURATION: 93 % | RESPIRATION RATE: 16 BRPM | HEIGHT: 71 IN | TEMPERATURE: 97.5 F | HEART RATE: 82 BPM

## 2017-04-24 DIAGNOSIS — R06.2 WHEEZING: ICD-10-CM

## 2017-04-24 DIAGNOSIS — R06.00 DYSPNEA, UNSPECIFIED TYPE: ICD-10-CM

## 2017-04-24 DIAGNOSIS — Z79.4 TYPE 2 DIABETES MELLITUS WITH DIABETIC NEUROPATHY, WITH LONG-TERM CURRENT USE OF INSULIN (HCC): ICD-10-CM

## 2017-04-24 DIAGNOSIS — J30.1 SEASONAL ALLERGIC RHINITIS DUE TO POLLEN: Primary | ICD-10-CM

## 2017-04-24 DIAGNOSIS — J02.9 PHARYNGITIS, UNSPECIFIED ETIOLOGY: ICD-10-CM

## 2017-04-24 DIAGNOSIS — E11.40 TYPE 2 DIABETES MELLITUS WITH DIABETIC NEUROPATHY, WITH LONG-TERM CURRENT USE OF INSULIN (HCC): ICD-10-CM

## 2017-04-24 LAB — GLUCOSE POC: 165 MG/DL

## 2017-04-24 RX ORDER — CETIRIZINE HCL 10 MG
10 TABLET ORAL
Qty: 30 TAB | Refills: 3 | Status: CANCELLED | OUTPATIENT
Start: 2017-04-24

## 2017-04-24 RX ORDER — AZITHROMYCIN 250 MG/1
TABLET, FILM COATED ORAL
Qty: 6 TAB | Refills: 0 | Status: CANCELLED | OUTPATIENT
Start: 2017-04-24 | End: 2017-04-29

## 2017-04-24 NOTE — PROGRESS NOTES
1. Have you been to the ER, urgent care clinic since your last visit? Hospitalized since your last visit? No    2. Have you seen or consulted any other health care providers outside of the [address] [place] [address] since your last visit? Include any pap smears or colon screening. No    Pt states his glucose reading this morning was in the 500's, but checked it 3 more times and it was in the low 100's. He requests glucose to be checked today.        POC Glucose 165

## 2017-04-24 NOTE — PROGRESS NOTES
Lieutenant Willingham is a 60 y.o.  male and presents with     Chief Complaint   Patient presents with    COPD    Cardiomyopathy       Pt took courase of maureen and Jacqueline Bernal and feels his cough and breathing have improved. He still has some wheezing but it has improved. Pt used to see Pulmoanry in the past who since left. Pt has appt with cardiology. He had EF of 30 % last year on ECHO. Pt was monitoring his sugars and it was high but is coming down. No leg swelling. Past Medical History:   Diagnosis Date    Biventricular ICD (implantable cardioverter-defibrillator) in place 07/16    Medtronic    CAD (coronary artery disease)     Cardiomyopathy, ischemic     EF 30% (04/16) 30-35% (11/15)    Diabetes ([facility]     DVT (deep venous thrombosis) ([facility] 08/16    L axillary vein after PPM insertion    HLD (hyperlipidemia)     Hypertension     NSTEMI (non-ST elevated myocardial infarction) ([facility]     S/P OM1 2.5 X 23 mm XIENCE (11/15)    Pulmonary emphysema ([facility]     Stroke ([facility]      Past Surgical History:   Procedure Laterality Date    HX PACEMAKER PLACEMENT      VASCULAR SURGERY PROCEDURE UNLIST      Stent placed right thigh     Current Outpatient Prescriptions   Medication Sig    furosemide (LASIX) 20 mg tablet     budesonide-formoterol (SYMBICORT) 160-4.5 mcg/actuation HFA inhaler Take 2 Puffs by inhalation two (2) times a day.  gabapentin (NEURONTIN) 300 mg capsule Take 1 Cap by mouth two (2) times a day.  potassium chloride (K-DUR, KLOR-CON) 10 mEq tablet Take 1 Tab by mouth daily.  mometasone (NASONEX) 50 mcg/actuation nasal spray 2 Sprays by Both Nostrils route daily.  pantoprazole (PROTONIX) 40 mg tablet Take 40 mg by mouth daily.  carvedilol (COREG) 25 mg tablet Take 12.5 mg by mouth two (2) times daily (with meals). 1/2 tab BID    nitroglycerin (NITROSTAT) 0.4 mg SL tablet 1 Tab by SubLINGual route every five (5) minutes as needed for Chest Pain.     NIFEdipine ER (ADALAT CC) 60 mg ER tablet Take 1 Tab by mouth daily.  losartan (COZAAR) 100 mg tablet Take 1 Tab by mouth daily.  hydrochlorothiazide (HYDRODIURIL) 25 mg tablet Take 1 Tab by mouth daily.  aspirin 81 mg chewable tablet Take 1 Tab by mouth daily. Indications: MYOCARDIAL INFARCTION PREVENTION    atorvastatin (LIPITOR) 80 mg tablet Take 1 Tab by mouth nightly.  hydrALAZINE (APRESOLINE) 25 mg tablet Take 1 Tab by mouth three (3) times daily.  insulin glargine (LANTUS) 100 unit/mL injection 35 Units by SubCUTAneous route nightly. pt takes 30 Units     albuterol (PROVENTIL HFA, VENTOLIN HFA, PROAIR HFA) 90 mcg/actuation inhaler Take 2 Puffs by inhalation every six (6) hours as needed for Wheezing.  predniSONE (STERAPRED DS) 10 mg dose pack See administration instruction per 10mg dose pack    acetaminophen (TYLENOL EXTRA STRENGTH) 500 mg tablet Take 2 Tabs by mouth every six (6) hours as needed for Pain. No current facility-administered medications for this visit. Health Maintenance   Topic Date Due    EYE EXAM RETINAL OR DILATED Q1  01/29/1965    HEMOGLOBIN A1C Q6M  10/04/2017    FOOT EXAM Q1  04/04/2018    MICROALBUMIN Q1  04/04/2018    LIPID PANEL Q1  04/04/2018    MEDICARE YEARLY EXAM  04/05/2018    COLONOSCOPY  02/19/2025    DTaP/Tdap/Td series (2 - Td) 04/04/2027    Hepatitis C Screening  Completed    ZOSTER VACCINE AGE 60>  Completed    Pneumococcal 19-64 Medium Risk  Completed    INFLUENZA AGE 9 TO ADULT  Completed     Immunization History   Administered Date(s) Administered    Influenza Vaccine (Quad) PF 10/20/2016    Pneumococcal Vaccine (Unspecified Type) 01/01/2013     No LMP for male patient. Allergies and Intolerances:    Allergies   Allergen Reactions    Lasix [Furosemide] Other (comments)    Levofloxacin Rash    Penicillins Swelling       Family History:   Family History   Problem Relation Age of Onset    Heart Disease Mother     Heart Disease Father        Social History:   He  reports that he quit smoking about 17 months ago. His smoking use included Cigarettes. He has a 45.00 pack-year smoking history. He has never used smokeless tobacco.  He  reports that he does not drink alcohol. Review of Systems:   General: negative for - chills, fatigue, fever, weight change  Psych: negative for - anxiety, depression, irritability or mood swings  ENT: negative for - headaches, hearing change, nasal congestion, oral lesions, sneezing or sore throat  Heme/ Lymph: negative for - bleeding problems, bruising, pallor or swollen lymph nodes  Endo: negative for - hot flashes, polydipsia/polyuria or temperature intolerance  Resp: negative for - cough, shortness of breath or wheezing  CV: negative for - chest pain, edema or palpitations  GI: negative for - abdominal pain, change in bowel habits, constipation, diarrhea or nausea/vomiting  : negative for - dysuria, hematuria, incontinence, pelvic pain or vulvar/vaginal symptoms  MSK: negative for - joint pain, joint swelling or muscle pain  Neuro: negative for - confusion, headaches, seizures or weakness  Derm: negative for - dry skin, hair changes, rash or skin lesion changes          Physical:   Vitals:   Vitals:    04/24/17 1059   BP: 141/83   Pulse: 82   Resp: 16   Temp: 97.5 °F (36.4 °C)   TempSrc: Oral   SpO2: 93%   Weight: 233 lb (105.7 kg)   Height: 5' 11\" (1.803 m)           Exam:   HEENT- atraumatic,normocephalic, awake, oriented, well nourished,nasal passages narrow, rt more than left  Neck - supple,no enlarged lymph nodes, no JVD, no thyromegaly, ? Upper resp   Chest- CTA, no rhonchi, no crackles, mild exp wheezing. Heart- rrr, no murmurs / gallop/rub  Abdomen- soft,BS+,NT, no hepatosplenomegaly  Ext - no c/c/edema   Neuro- no focal deficits. Power 5/5 all extremities  Skin - warm,dry, no obvious rashes.           Review of Data:   LABS:   Lab Results   Component Value Date/Time    WBC 6.8 04/04/2017 10:10 AM    HGB 13.8 04/04/2017 10:10 AM    HCT 43.9 04/04/2017 10:10 AM    PLATELET 755 60/93/1066 10:10 AM     Lab Results   Component Value Date/Time    Sodium 140 04/04/2017 10:10 AM    Potassium 3.8 04/04/2017 10:10 AM    Chloride 98 04/04/2017 10:10 AM    CO2 23 04/04/2017 10:10 AM    Glucose 117 04/04/2017 10:10 AM    BUN 23 04/04/2017 10:10 AM    Creatinine 1.09 04/04/2017 10:10 AM     Lab Results   Component Value Date/Time    Cholesterol, total 145 04/04/2017 10:10 AM    HDL Cholesterol 59 04/04/2017 10:10 AM    LDL, calculated 65 04/04/2017 10:10 AM    Triglyceride 104 04/04/2017 10:10 AM     No results found for: GPT        Impression / Plan:        ICD-10-CM ICD-9-CM    1. Seasonal allergic rhinitis due to pollen J30.1 477.0    2. Pharyngitis, unspecified etiology J02.9 462    3. Type 2 diabetes mellitus with diabetic neuropathy, with long-term current use of insulin ([facility]) E11.40 250.60 AMB POC GLUCOSE BLOOD, BY GLUCOSE MONITORING DEVICE    Z79.4 357.2      V58.67    4. Wheezing R06.2 786.07 REFERRAL TO ENT-OTOLARYNGOLOGY   5. Dyspnea, unspecified type R06.00 786.09 REFERRAL TO ENT-OTOLARYNGOLOGY     Keep appt with cardiology. Explained to patient risk benefits of the medications. Advised patient to stop meds if having any side effects. Pt verbalized understanding of the instructions. I have discussed the diagnosis with the patient and the intended plan as seen in the above orders. The patient has received an after-visit summary and questions were answered concerning future plans. I have discussed medication side effects and warnings with the patient as well. I have reviewed the plan of care with the patient, accepted their input and they are in agreement with the treatment goals. Reviewed plan of care. Patient has provided input and agrees with goals.     Follow-up Disposition: Not on Bharathi Floyd MD

## 2017-04-24 NOTE — MR AVS SNAPSHOT
Visit Information Date & Time Provider Department Dept. Phone Encounter #  
 4/24/2017 11:00 AM [id number], [facility] [phone] [id number] Follow-up Instructions Return in about 3 months (around 7/24/2017). Your Appointments 4/26/2017  3:40 PM  
[facility] with [facility] ([address] [place] [address]) Appt Note: 3 month f/u after January- [facility] [place] [address] [address] [address] [phone] [place] [id number]  
  
    
 5/9/2017 11:00 AM  
Follow Up with Matthew Nath MD  
Cardio Specialist at [address]) Appt Note: 6 m f/u after [facility] [address] Upcoming Health Maintenance Date Due  
 EYE EXAM RETINAL OR DILATED Q1 1/29/1965 HEMOGLOBIN A1C Q6M 10/4/2017 FOOT EXAM Q1 4/4/2018 MICROALBUMIN Q1 4/4/2018 LIPID PANEL Q1 4/4/2018 MEDICARE YEARLY EXAM 4/5/2018 COLONOSCOPY 2/19/2025 DTaP/Tdap/Td series (2 - Td) 4/4/2027 Allergies as of 4/24/2017  Review Complete On: 4/24/2017 By: [id number] Weinberg MD  
  
 Severity Noted Reaction Type Reactions Lasix [Furosemide]  05/16/2016    Other (comments) Levofloxacin  05/25/2016    Rash Penicillins  11/28/2015    Swelling Current Immunizations  Reviewed on 10/20/2016 Name Date Influenza Vaccine (Quad) PF 10/20/2016 Pneumococcal Vaccine (Unspecified Type) 1/1/2013 Not reviewed this visit You Were Diagnosed With   
  
 Codes Comments Seasonal allergic rhinitis due to pollen    -  Primary ICD-10-CM: J30.1 ICD-9-CM: 477.0 Pharyngitis, unspecified etiology     ICD-10-CM: J02.9 ICD-9-CM: 112  Type 2 diabetes mellitus with diabetic neuropathy, with long-term current use of insulin ([facility])     ICD-10-CM: E11.40, Z79.4 ICD-9-CM: 250.60, 357.2, V58.67 Wheezing     ICD-10-CM: R06.2 ICD-9-CM: 786.07 Dyspnea, unspecified type     ICD-10-CM: R06.00 
ICD-9-CM: 786.09 Vitals BP Pulse Temp Resp Height(growth percentile) Weight(growth percentile) 141/83 82 97.5 °F (36.4 °C) (Oral) 16 5' 11\" (1.803 m) 233 lb (105.7 kg) SpO2 BMI Smoking Status 93% 32.5 kg/m2 Former Smoker Vitals History BMI and BSA Data Body Mass Index Body Surface Area 32.5 kg/m 2 2.3 m 2 Preferred Pharmacy Pharmacy Name Phone [facility] - [address], [address] [phone] Your Updated Medication List  
  
   
This list is accurate as of: 4/24/17 11:53 AM.  Always use your most recent med list.  
  
  
  
  
 acetaminophen 500 mg tablet Commonly known as:  [address] Take 2 Tabs by mouth every six (6) hours as needed for Pain. albuterol 90 mcg/actuation inhaler Commonly known as:  PROVENTIL HFA, VENTOLIN HFA, PROAIR HFA Take 2 Puffs by inhalation every six (6) hours as needed for Wheezing. aspirin 81 mg chewable tablet Take 1 Tab by mouth daily. Indications: MYOCARDIAL INFARCTION PREVENTION  
  
 atorvastatin 80 mg tablet Commonly known as:  LIPITOR Take 1 Tab by mouth nightly. budesonide-formoterol 160-4.5 mcg/actuation HFA inhaler Commonly known as:  SYMBICORT Take 2 Puffs by inhalation two (2) times a day. carvedilol 25 mg tablet Commonly known as:  Cleda Chris Take 12.5 mg by mouth two (2) times daily (with meals). 1/2 tab BID  
  
 furosemide 20 mg tablet Commonly known as:  LASIX  
  
 gabapentin 300 mg capsule Commonly known as:  NEURONTIN Take 1 Cap by mouth two (2) times a day. hydrALAZINE 25 mg tablet Commonly known as:  APRESOLINE Take 1 Tab by mouth three (3) times daily. hydroCHLOROthiazide 25 mg tablet Commonly known as:  HYDRODIURIL  
 Take 1 Tab by mouth daily. LANTUS 100 unit/mL injection Generic drug:  insulin glargine 35 Units by SubCUTAneous route nightly. pt takes 30 Units  
  
 losartan 100 mg tablet Commonly known as:  COZAAR Take 1 Tab by mouth daily. mometasone 50 mcg/actuation nasal spray Commonly known as:  NASONEX  
2 Sprays by Both Nostrils route daily. NIFEdipine ER 60 mg ER tablet Commonly known as:  ADALAT CC Take 1 Tab by mouth daily. nitroglycerin 0.4 mg SL tablet Commonly known as:  NITROSTAT  
1 Tab by SubLINGual route every five (5) minutes as needed for Chest Pain.  
  
 potassium chloride 10 mEq tablet Commonly known as:  K-DUR, KLOR-CON Take 1 Tab by mouth daily. predniSONE 10 mg dose pack Commonly known as:  STERAPRED DS See administration instruction per 10mg dose pack PROTONIX 40 mg tablet Generic drug:  pantoprazole Take 40 mg by mouth daily. We Performed the Following AMB POC GLUCOSE BLOOD, BY GLUCOSE MONITORING DEVICE [77757 CPT(R)] REFERRAL TO ENT-OTOLARYNGOLOGY [BXG16 Custom] Comments:  
 R/o upper resp obstruction Follow-up Instructions Return in about 3 months (around 7/24/2017). Referral Information Referral ID Referred By Referred To  
  
 [id number] Glen PIERSON Not Available Visits Status Start Date End Date 1 New Request 4/24/17 4/24/18 If your referral has a status of pending review or denied, additional information will be sent to support the outcome of this decision. Introducing [facility] HEALTH SERVICES! Dear Sapna Martín: Thank you for requesting a [employer] account. Our records indicate that you already have an active [employer]io account. You can access your account anytime at [email or web address] Did you know that you can access your hospital and ER discharge instructions at any time in Absio?   You can also review all of your test results from your hospital stay or ER visit. Additional Information If you have questions, please visit the Frequently Asked Questions section of the Et[employer] website at [email or web address] Remember, Et[employer]ase is NOT to be used for urgent needs. For medical emergencies, dial 911. Now available from your iPhone and Android! Please provide this summary of care documentation to your next provider. Your primary care clinician is listed as Miles Hicks. If you have any questions after today's visit, please call [phone].

## 2017-04-26 ENCOUNTER — OFFICE VISIT (OUTPATIENT)
Dept: CARDIOLOGY CLINIC | Age: 62
End: 2017-04-26

## 2017-04-26 DIAGNOSIS — I50.42 CHRONIC COMBINED SYSTOLIC AND DIASTOLIC HEART FAILURE (HCC): Primary | ICD-10-CM

## 2017-04-26 DIAGNOSIS — Z95.810 AICD (AUTOMATIC CARDIOVERTER/DEFIBRILLATOR) PRESENT: ICD-10-CM

## 2017-05-05 ENCOUNTER — TELEPHONE (OUTPATIENT)
Dept: CARDIOLOGY CLINIC | Age: 62
End: 2017-05-05

## 2017-05-05 NOTE — TELEPHONE ENCOUNTER
Contacted Dr. Maria Del Carmen [place] office and spoke with Crow Pink to request an updated referral for patient to Dr. Ariane Graham for [id number] appointment.

## 2017-05-09 ENCOUNTER — OFFICE VISIT (OUTPATIENT)
Dept: CARDIOLOGY CLINIC | Age: 62
End: 2017-05-09

## 2017-05-09 VITALS
DIASTOLIC BLOOD PRESSURE: 68 MMHG | SYSTOLIC BLOOD PRESSURE: 115 MMHG | WEIGHT: 237 LBS | HEIGHT: 71 IN | OXYGEN SATURATION: 95 % | HEART RATE: 81 BPM | BODY MASS INDEX: 33.18 KG/M2

## 2017-05-09 DIAGNOSIS — I42.9 CARDIOMYOPATHY, UNSPECIFIED TYPE (HCC): ICD-10-CM

## 2017-05-09 DIAGNOSIS — I25.83 CORONARY ARTERY DISEASE DUE TO LIPID RICH PLAQUE: ICD-10-CM

## 2017-05-09 DIAGNOSIS — E78.00 PURE HYPERCHOLESTEROLEMIA: ICD-10-CM

## 2017-05-09 DIAGNOSIS — I10 ESSENTIAL HYPERTENSION WITH GOAL BLOOD PRESSURE LESS THAN 140/90: Primary | ICD-10-CM

## 2017-05-09 DIAGNOSIS — I25.10 CORONARY ARTERY DISEASE DUE TO LIPID RICH PLAQUE: ICD-10-CM

## 2017-05-09 NOTE — PROGRESS NOTES
1. Have you been to the ER, urgent care clinic since your last visit? Hospitalized since your last visit? No    2. Have you seen or consulted any other health care providers outside of the [address] [place] [address] since your last visit? Include any pap smears or colon screening.  No

## 2017-05-09 NOTE — MR AVS SNAPSHOT
Visit Information Date & Time Provider Department Dept. Phone Encounter #  
 5/9/2017 11:00 AM Luis Campos MD [address] [place] [address] Specialist at [facility] [phone] [id number] Follow-up Instructions Return in about 9 months (around 2/9/2018). Follow-up and Disposition History Your Appointments 6/28/2017  9:45 AM  
PROCEDURE with Roldan Lambert Csi Cardio Specialist at [address]) Appt Note: 1 year [place] [address] [address] [place] [address]  
  
    
 7/26/2017 10:30 AM  
Follow Up with Jamal Farley MD  
[facility] [address]) Appt Note: Return in 3 months (7/26/17). [address] [facility] [address] [facility] 83 4601 [facility]  
  
    
 8/2/2017  2:00 PM  
CARELINK with Roldan Ramosi [facility] [place] ([facility]) Appt Note: Cristobal Bauman [address] [facility] [id number]  
[phone] [address] [place] [address] [address] Upcoming Health Maintenance Date Due  
 EYE EXAM RETINAL OR DILATED Q1 1/29/1965 INFLUENZA AGE 9 TO ADULT 8/1/2017 HEMOGLOBIN A1C Q6M 10/4/2017 FOOT EXAM Q1 4/4/2018 MICROALBUMIN Q1 4/4/2018 LIPID PANEL Q1 4/4/2018 MEDICARE YEARLY EXAM 4/5/2018 COLONOSCOPY 2/19/2025 DTaP/Tdap/Td series (2 - Td) 4/4/2027 Allergies as of 5/9/2017  Review Complete On: 5/9/2017 By: Chayito Garcia LPN Severity Noted Reaction Type Reactions Lasix [Furosemide]  05/16/2016    Other (comments) Levofloxacin  05/25/2016    Rash Penicillins  11/28/2015    Swelling Current Immunizations  Reviewed on 10/20/2016 Name Date Influenza Vaccine (Quad) PF 10/20/2016 Pneumococcal Vaccine (Unspecified Type) 1/1/2013 Not reviewed this visit You Were Diagnosed With   
  
 Codes Comments Essential hypertension with goal blood pressure less than 140/90    -  Primary ICD-10-CM: I10 
ICD-9-CM: 401.9 Coronary artery disease due to lipid rich plaque     ICD-10-CM: I25.10, I25.83 ICD-9-CM: 414.00, 414.3 Pure hypercholesterolemia     ICD-10-CM: E78.00 ICD-9-CM: 272.0 Cardiomyopathy, unspecified type     ICD-10-CM: I42.9 ICD-9-CM: 425.4 Vitals BP Pulse Height(growth percentile) Weight(growth percentile) SpO2 BMI  
 115/68 81 5' 11\" (1.803 m) 237 lb (107.5 kg) 95% 33.05 kg/m2 Smoking Status Former Smoker BMI and BSA Data Body Mass Index Body Surface Area 33.05 kg/m 2 2.32 m 2 Preferred Pharmacy Pharmacy Name Phone [facility] - [address], [address] [phone] Your Updated Medication List  
  
   
This list is accurate as of: 5/9/17  4:00 PM.  Always use your most recent med list.  
  
  
  
  
 acetaminophen 500 mg tablet Commonly known as:  [address] Take 2 Tabs by mouth every six (6) hours as needed for Pain. albuterol 90 mcg/actuation inhaler Commonly known as:  PROVENTIL HFA, VENTOLIN HFA, PROAIR HFA Take 2 Puffs by inhalation every six (6) hours as needed for Wheezing. aspirin 81 mg chewable tablet Take 1 Tab by mouth daily. Indications: MYOCARDIAL INFARCTION PREVENTION  
  
 atorvastatin 80 mg tablet Commonly known as:  LIPITOR Take 1 Tab by mouth nightly. budesonide-formoterol 160-4.5 mcg/actuation HFA inhaler Commonly known as:  SYMBICORT Take 2 Puffs by inhalation two (2) times a day. carvedilol 25 mg tablet Commonly known as:  Aleksandra Stable Take 12.5 mg by mouth two (2) times daily (with meals). 1/2 tab BID  
  
 furosemide 20 mg tablet Commonly known as:  LASIX  
  
 gabapentin 300 mg capsule Commonly known as:  NEURONTIN Take 1 Cap by mouth two (2) times a day. hydrALAZINE 25 mg tablet Commonly known as:  APRESOLINE Take 1 Tab by mouth three (3) times daily. hydroCHLOROthiazide 25 mg tablet Commonly known as:  HYDRODIURIL Take 1 Tab by mouth daily. LANTUS 100 unit/mL injection Generic drug:  insulin glargine 35 Units by SubCUTAneous route nightly. pt takes 30 Units  
  
 losartan 100 mg tablet Commonly known as:  COZAAR Take 1 Tab by mouth daily. mometasone 50 mcg/actuation nasal spray Commonly known as:  NASONEX  
2 Sprays by Both Nostrils route daily. NIFEdipine ER 60 mg ER tablet Commonly known as:  ADALAT CC Take 1 Tab by mouth daily. nitroglycerin 0.4 mg SL tablet Commonly known as:  NITROSTAT  
1 Tab by SubLINGual route every five (5) minutes as needed for Chest Pain.  
  
 potassium chloride 10 mEq tablet Commonly known as:  KLOR-CON Take 1 Tab by mouth daily. predniSONE 10 mg dose pack Commonly known as:  STERAPRED DS See administration instruction per 10mg dose pack PROTONIX 40 mg tablet Generic drug:  pantoprazole Take 40 mg by mouth daily. Follow-up Instructions Return in about 9 months (around 2/9/2018). Introducing [facility]! Dear Bharat Painter: Thank you for requesting a Printi account. Our records indicate that you already have an active Printi account. You can access your account anytime at [email or web address] Did you know that you can access your hospital and ER discharge instructions at any time in Printi? You can also review all of your test results from your hospital stay or ER visit. Additional Information If you have questions, please visit the Frequently Asked Questions section of the Printi website at [email or web address] Remember, Printi is NOT to be used for urgent needs. For medical emergencies, dial 911. Now available from your iPhone and Android! Please provide this summary of care documentation to your next provider. Your primary care clinician is listed as [id number]. If you have any questions after today's visit, please call [phone].

## 2017-05-09 NOTE — PROGRESS NOTES
Cardiovascular Specialists    Mr. Ileana Kennedy is a 60 y.o. male with a history of coronary artery disease, status post OM1 stent in November, 2015, diabetes, hypertension, stroke, hyperlipidemia, cardiomyopathy s/p BiV ICD in 07/16    Mr. Ileana Kennedy is here today for follow up appointment. He denies any symptoms to suggest angina or heart failure. He uses two pillows at night. He denies any ICD shock. He denies any use of sublingual nitroglycerin since last time. He takes his medications without any problem. Denies any nausea, vomiting, abdominal pain, fever, chills, sputum production. No hematuria or other bleeding complaints    Past Medical History:   Diagnosis Date    Biventricular ICD (implantable cardioverter-defibrillator) in place 07/16    Medtronic    CAD (coronary artery disease)     Cardiomyopathy, ischemic     EF 30% (04/16) 30-35% (11/15)    Diabetes ([address]     DVT (deep venous thrombosis) ([address] 08/16    L axillary vein after PPM insertion    HLD (hyperlipidemia)     Hypertension     NSTEMI (non-ST elevated myocardial infarction) ([facility])     S/P OM1 2.5 X 23 mm XIENCE (11/15)    Pulmonary emphysema ([address]     Stroke ([address]          Past Surgical History:   Procedure Laterality Date    HX PACEMAKER PLACEMENT      VASCULAR SURGERY PROCEDURE UNLIST      Stent placed right thigh       Current Outpatient Prescriptions   Medication Sig    furosemide (LASIX) 20 mg tablet     albuterol (PROVENTIL HFA, VENTOLIN HFA, PROAIR HFA) 90 mcg/actuation inhaler Take 2 Puffs by inhalation every six (6) hours as needed for Wheezing.  budesonide-formoterol (SYMBICORT) 160-4.5 mcg/actuation HFA inhaler Take 2 Puffs by inhalation two (2) times a day.  predniSONE (STERAPRED DS) 10 mg dose pack See administration instruction per 10mg dose pack    gabapentin (NEURONTIN) 300 mg capsule Take 1 Cap by mouth two (2) times a day.     potassium chloride (K-DUR, KLOR-CON) 10 mEq tablet Take 1 Tab by mouth daily.  acetaminophen (TYLENOL EXTRA STRENGTH) 500 mg tablet Take 2 Tabs by mouth every six (6) hours as needed for Pain.  mometasone (NASONEX) 50 mcg/actuation nasal spray 2 Sprays by Both Nostrils route daily.  pantoprazole (PROTONIX) 40 mg tablet Take 40 mg by mouth daily.  carvedilol (COREG) 25 mg tablet Take 12.5 mg by mouth two (2) times daily (with meals). 1/2 tab BID    nitroglycerin (NITROSTAT) 0.4 mg SL tablet 1 Tab by SubLINGual route every five (5) minutes as needed for Chest Pain.  NIFEdipine ER (ADALAT CC) 60 mg ER tablet Take 1 Tab by mouth daily.  losartan (COZAAR) 100 mg tablet Take 1 Tab by mouth daily.  hydrochlorothiazide (HYDRODIURIL) 25 mg tablet Take 1 Tab by mouth daily.  aspirin 81 mg chewable tablet Take 1 Tab by mouth daily. Indications: MYOCARDIAL INFARCTION PREVENTION    atorvastatin (LIPITOR) 80 mg tablet Take 1 Tab by mouth nightly.  hydrALAZINE (APRESOLINE) 25 mg tablet Take 1 Tab by mouth three (3) times daily.  insulin glargine (LANTUS) 100 unit/mL injection 35 Units by SubCUTAneous route nightly. pt takes 30 Units      No current facility-administered medications for this visit. Allergies and Sensitivities:  Allergies   Allergen Reactions    Lasix [Furosemide] Other (comments)    Levofloxacin Rash    Penicillins Swelling       Family History:  Family History   Problem Relation Age of Onset    Heart Disease Mother     Heart Disease Father        Social History:  Social History   Substance Use Topics    Smoking status: Former Smoker     Packs/day: 1.50     Years: 30.00     Types: Cigarettes     Quit date: 11/13/2015    Smokeless tobacco: Never Used    Alcohol use No     He  reports that he quit smoking about 17 months ago. His smoking use included Cigarettes. He has a 45.00 pack-year smoking history. He has never used smokeless tobacco.  He  reports that he does not drink alcohol.     Review of Systems:  Cardiac symptoms as noted above in HPI. All others negative. Denies malaise, skin rash, blurring vision, photophobia, neck pain, hemoptysis, chronic cough, nausea, vomiting, hematuria, burning micturition, BRBPR, chronic headaches. Physical Exam:  BP Readings from Last 3 Encounters:   05/09/17 115/68   04/24/17 141/83   04/21/17 138/87         Pulse Readings from Last 3 Encounters:   05/09/17 81   04/24/17 82   04/21/17 78          Wt Readings from Last 3 Encounters:   05/09/17 237 lb (107.5 kg)   04/24/17 233 lb (105.7 kg)   04/21/17 235 lb (106.6 kg)       Constitutional: Oriented to person, place, and time. HENT: Head: Normocephalic and atraumatic. Neck: No JVD present. Cardiovascular: Regular rhythm. No murmur, gallop or rubs appreciated  Lung: Breath sounds normal. No respiratory distress. No ronchi or rales appreciated  Abdominal: No tenderness. No rebound and no guarding. Musculoskeletal: There is no lower extremity edema. No cynosis. Review of Data  LABS:   Lab Results   Component Value Date/Time    Sodium 140 04/04/2017 10:10 AM    Potassium 3.8 04/04/2017 10:10 AM    Chloride 98 04/04/2017 10:10 AM    CO2 23 04/04/2017 10:10 AM    Glucose 117 04/04/2017 10:10 AM    BUN 23 04/04/2017 10:10 AM    Creatinine 1.09 04/04/2017 10:10 AM     Lipids Latest Ref Rng & Units 4/4/2017 9/23/2016 5/10/2016 11/29/2015 8/15/2015   Chol, Total 100 - 199 mg/dL 145 128 120 165 271(H)   HDL >39 mg/dL 59 59 50 58 67(H)   LDL 0 - 99 mg/dL 65 50 37 75.6 169. 6(H)   Trig 0 - 149 mg/dL 104 94 165(H) 157(H) 172(H)   Chol/HDL Ratio 0 - 5.0   - - - 2.8 4.0     Lab Results   Component Value Date/Time    ALT (SGPT) 20 04/04/2017 10:10 AM     Lab Results   Component Value Date/Time    Hemoglobin A1c 6.2 04/04/2017 10:10 AM       EKG    ECHO (11/15)  Left ventricle: The ventricle was dilated. Systolic function was moderately reduced. Ejection fraction was estimated in the range of 30 %  to 35 %.  There was mild diffuse hypokinesis. There was severe hypokinesis of inferior/inferolateral wall. Wall thickness was mildly increased. Doppler parameters were consistent with abnormal left ventricular relaxation (grade 1 diastolic dysfunction). Right ventricle: Systolic function was normal.  Aortic valve: There was no stenosis. ECHO (04/16)  Left ventricle: Size was at the upper limits of normal. Systolic function  was moderately to markedly reduced by visual assessment. Ejection fraction  was estimated to be 30 %. There was severe hypokinesis of the basal-mid  inferior and basal-mid inferolateral wall(s). Wall thickness was mildly  increased. Doppler parameters were consistent with abnormal left  ventricular relaxation (grade 1 diastolic dysfunction). Right ventricle: Systolic function was normal. Estimated peak pressure was in the range of 30 mmHg to 35 mmHg. Left atrium: The atrium was mildly dilated. Right atrium: The atrium was mildly dilated. Mitral valve: There was mild annular calcification. There was mild regurgitation. Aortic valve: There was no stenosis. Tricuspid valve: There was trivial regurgitation. CATHETERIZATION (11/15)  - LVEF estimated to be 30% with diffuse hypokinesis. No significant mitral regurgitation was noted. - LM: calcification, 20% distal   - LAD had diffuse 50% stenosis in its midportion of the vessel with only luminal irregularities in the proximal LAD. There was a  discrete 50% distal vessel stenosis, as well. The diagonal branches had mild disease.   - LCx: Circumflex had an ostial 50-60% stenosis,   - OM1: 100% thrombotic occlusion proximally. There was very faint collateral filling from the RCA. - RCA: Prox  50% diffuse, 40% distal disease. The right PDA had an 80% ostial stenosis and then a 90% mid vessel stenosis. The right  posterolateral branch had a 90% stenosis in a sub branch. 4. PCI: 100% OM-1 stenosis reduced to 0%.   drug-eluting stent, Xience 2.5 x 23 mm     IMPRESSION & PLAN:  Mr. Rosalind Kemp is a 60 y.o. male with cardiomyopathy, coronary artery disease, hypertension, hyperlipidemia. Coronary artery disease:  Mr. Rosalind Kemp had an OM1 stent in November, 2015. No anginal chest pain. No use of S/L NTG since last visitContinue with medical management, including aspirin lifelong. He is also on Coreg and Lipitor. Cardiomyopathy:    His initial ejection fraction in a setting of NSTEMI was 30-35% in November 2015. A repeat EF remains 30% in April 2016, despite being on appropriate medical management. S/P Bi-V AICD placement by  in 07/16. On Coreg, Nifedipine, Losartan, Hydralazine and Hydrochlorothiazide. No ICD shock. He is also on lasix. NO fluid overload on exam today  Following was discussed again.   - Fluid restriction 1.8 L / Day  - Advised patient for salt restriction in diet. - Sign, symptoms of CHF and diagnosis and management for CHF was discussed with patient in detail.   - Patient was advised to obtain daily am weight and if there is weight gain > 5 lbs over 3-4 days, was advised to take extra dose of diuretics for few days and once achieve baseline weight, reduce back to maintanance dose. Hypertension:  BP is 115/68 mm hg. Continue same meds. Hyperlipidemia:  He is on Atorvastatin 80 mg daily. Continue same. Last LDL 65 in 04/17    DVT: Left UE DVT after PPM. Started Xarelto in 08/16. Finished 3 months course and no issues since then    Importance of diet and exercise was discussed with patient. This plan was discussed with patient who is in agreement. Thank you for allowing me to participate in patient care. Please feel free to call me if you have any question or concern. Neda Crow MD  Please note: This document has been produced using voice recognition software. Unrecognized errors in transcription may be present.

## 2017-05-10 NOTE — PROGRESS NOTES
I have personally seen and evaluated the device findings. Interrogation reviewed and I agree with assessment.     Killian Jones

## 2017-05-11 ENCOUNTER — TELEPHONE (OUTPATIENT)
Dept: SURGERY | Age: 62
End: 2017-05-11

## 2017-06-26 ENCOUNTER — TELEPHONE (OUTPATIENT)
Dept: CARDIOLOGY CLINIC | Age: 62
End: 2017-06-26

## 2017-06-26 NOTE — TELEPHONE ENCOUNTER
Informed pt that [facility] stated the [place] request for release of information was missing forms. I then called the [place] to request and other request be sent to our office to start the process again. [facility] will not [facility] the request until they have another signed request from our office faxed over requesting the request.      Pt stated he would come up to office tomorrow (6/27/17) to sign another release form.

## 2017-07-12 ENCOUNTER — CLINICAL SUPPORT (OUTPATIENT)
Dept: CARDIOLOGY CLINIC | Age: 62
End: 2017-07-12

## 2017-07-12 DIAGNOSIS — I50.42 CHRONIC COMBINED SYSTOLIC AND DIASTOLIC HEART FAILURE (HCC): Primary | ICD-10-CM

## 2017-07-12 DIAGNOSIS — Z95.810 AICD (AUTOMATIC CARDIOVERTER/DEFIBRILLATOR) PRESENT: ICD-10-CM

## 2017-07-19 ENCOUNTER — TELEPHONE (OUTPATIENT)
Dept: SURGERY | Age: 62
End: 2017-07-19

## 2017-07-19 NOTE — TELEPHONE ENCOUNTER
Mr. Wes Clements called requesting to cancel his colonoscopy scheduled on Thursday, July 27, 2017 and reschedule to our next availability, Thursday, September 28, 2017 at 3:30pm with an arrival time of 2:00pm.

## 2017-07-26 ENCOUNTER — OFFICE VISIT (OUTPATIENT)
Dept: FAMILY MEDICINE CLINIC | Age: 62
End: 2017-07-26

## 2017-07-26 VITALS
WEIGHT: 231.4 LBS | BODY MASS INDEX: 32.4 KG/M2 | HEIGHT: 71 IN | SYSTOLIC BLOOD PRESSURE: 110 MMHG | TEMPERATURE: 97.6 F | OXYGEN SATURATION: 94 % | RESPIRATION RATE: 15 BRPM | HEART RATE: 81 BPM | DIASTOLIC BLOOD PRESSURE: 73 MMHG

## 2017-07-26 DIAGNOSIS — I25.10 CORONARY ARTERY DISEASE INVOLVING NATIVE CORONARY ARTERY OF NATIVE HEART WITHOUT ANGINA PECTORIS: ICD-10-CM

## 2017-07-26 DIAGNOSIS — Z79.4 TYPE 2 DIABETES MELLITUS WITH DIABETIC NEUROPATHY, WITH LONG-TERM CURRENT USE OF INSULIN (HCC): Primary | ICD-10-CM

## 2017-07-26 DIAGNOSIS — E11.40 TYPE 2 DIABETES MELLITUS WITH DIABETIC NEUROPATHY, WITH LONG-TERM CURRENT USE OF INSULIN (HCC): Primary | ICD-10-CM

## 2017-07-26 DIAGNOSIS — E78.00 HYPERCHOLESTEREMIA: ICD-10-CM

## 2017-07-26 DIAGNOSIS — I73.9 PAD (PERIPHERAL ARTERY DISEASE) (HCC): ICD-10-CM

## 2017-07-26 DIAGNOSIS — I10 ESSENTIAL HYPERTENSION: ICD-10-CM

## 2017-07-26 NOTE — PROGRESS NOTES
Carloz Saunders is a 60 y.o.  male and presents with     Chief Complaint   Patient presents with    Diabetes    Claudication    Hypertension    Coronary Artery Disease    Cholesterol Problem       Pt has been taking his meds for DM, HTN and hyperchol as recommedned. He goes to [place] where he sees his primary also. He had eye exam at the [place]. Pt denies chest pain . He is not using  Sl nitro. He sees vascular and says he is doing okay with his legs. He has dry cough for couple of months. He saw ENt and was told s allergies. Past Medical History:   Diagnosis Date    Biventricular ICD (implantable cardioverter-defibrillator) in place 07/16    Medtronic    CAD (coronary artery disease)     Cardiomyopathy, ischemic     EF 30% (04/16) 30-35% (11/15)    [facility]     DVT (deep venous thrombosis) ([facility] 08/16    L axillary vein after PPM insertion    HLD (hyperlipidemia)     Hypertension     [facility]STEM[facility]non-ST elevated [facility] infarction) ([facility])     S/P OM1 2.5 X 23 mm XIENCE (11/15)    Pulmonary emphysema ([facility]     Stroke ([facility]      Past Surgical History:   Procedure Laterality Date    HX PACEMAKER PLACEMENT      VASCULAR SURGERY PROCEDURE UNLIST      Stent placed right thigh     Current Outpatient Prescriptions   Medication Sig    furosemide (LASIX) 20 mg tablet     albuterol (PROVENTIL HFA, VENTOLIN HFA, PROAIR HFA) 90 mcg/actuation inhaler Take 2 Puffs by inhalation every six (6) hours as needed for Wheezing.  acetaminophen (TYLENOL EXTRA STRENGTH) 500 mg tablet Take 2 Tabs by mouth every six (6) hours as needed for Pain.  pantoprazole (PROTONIX) 40 mg tablet Take 40 mg by mouth daily.  carvedilol (COREG) 25 mg tablet Take 12.5 mg by mouth two (2) times daily (with meals). 1/2 tab BID    NIFEdipine ER (ADALAT CC) 60 mg ER tablet Take 1 Tab by mouth daily.  losartan (COZAAR) 100 mg tablet Take 1 Tab by mouth daily.     hydrochlorothiazide (HYDRODIURIL) 25 mg tablet Take 1 Tab by mouth daily.  aspirin 81 mg chewable tablet Take 1 Tab by mouth daily. Indications: MYOCARDIAL INFARCTION PREVENTION    atorvastatin (LIPITOR) 80 mg tablet Take 1 Tab by mouth nightly.  hydrALAZINE (APRESOLINE) 25 mg tablet Take 1 Tab by mouth three (3) times daily.  insulin glargine (LANTUS) 100 unit/mL injection 35 Units by SubCUTAneous route nightly. pt takes 30 Units     budesonide-formoterol (SYMBICORT) 160-4.5 mcg/actuation HFA inhaler Take 2 Puffs by inhalation two (2) times a day.  mometasone (NASONEX) 50 mcg/actuation nasal spray 2 Sprays by Both Nostrils route daily. No current facility-administered medications for this visit. Health Maintenance   Topic Date Due    EYE EXAM RETINAL OR DILATED Q1  01/29/1965    INFLUENZA AGE 9 TO ADULT  08/01/2017    HEMOGLOBIN A1C Q6M  10/04/2017    FOOT EXAM Q1  04/04/2018    MICROALBUMIN Q1  04/04/2018    LIPID PANEL Q1  04/04/2018    MEDICARE YEARLY EXAM  04/05/2018    COLONOSCOPY  02/19/2025    DTaP/Tdap/Td series (2 - Td) 04/04/2027    Hepatitis C Screening  Completed    ZOSTER VACCINE AGE 60>  Completed    Pneumococcal 19-64 Medium Risk  Completed     Immunization History   Administered Date(s) Administered    Influenza Vaccine (Quad) PF 10/20/2016    Pneumococcal Vaccine (Unspecified Type) 01/01/2013     No LMP for male patient. Allergies and Intolerances: Allergies   Allergen Reactions    Lasix [Furosemide] Other (comments)    Levofloxacin Rash    Penicillins Swelling       Family History:   Family History   Problem Relation Age of Onset    Heart Disease Mother     Heart Disease Father        Social History:   He  reports that he quit smoking about 20 months ago. His smoking use included Cigarettes. He has a 45.00 pack-year smoking history. He has never used smokeless tobacco.  He  reports that he does not drink alcohol.             Review of Systems: pos for dry cough  General: negative for - chills, fatigue, fever, weight change  Psych: negative for - anxiety, depression, irritability or mood swings  ENT: negative for - headaches, hearing change, nasal congestion, oral lesions, sneezing or sore throat  Heme/ Lymph: negative for - bleeding problems, bruising, pallor or swollen lymph nodes  Endo: negative for - hot flashes, polydipsia/polyuria or temperature intolerance  Resp: negative for - cough, shortness of breath or wheezing  CV: negative for - chest pain, edema or palpitations  GI: negative for - abdominal pain, change in bowel habits, constipation, diarrhea or nausea/vomiting  : negative for - dysuria, hematuria, incontinence, pelvic pain or vulvar/vaginal symptoms  MSK: negative for - joint pain, joint swelling or muscle pain  Neuro: negative for - confusion, headaches, seizures or weakness  Derm: negative for - dry skin, hair changes, rash or skin lesion changes          Physical:   Vitals:   Vitals:    07/26/17 1036   BP: 110/73   Pulse: 81   Resp: 15   Temp: 97.6 °F (36.4 °C)   TempSrc: Oral   SpO2: 94%   Weight: 231 lb 6.4 oz (105 kg)   Height: 5' 11\" (1.803 m)           Exam:   HEENT- atraumatic,normocephalic, awake, oriented, well nourished  Neck - supple,no enlarged lymph nodes, no JVD, no thyromegaly  Chest- CTA, no rhonchi, no crackles  Heart- rrr, no murmurs / gallop/rub  Abdomen- soft,BS+,NT, no hepatosplenomegaly  Ext - no c/c/edema   Neuro- no focal deficits. Power 5/5 all extremities  Skin - warm,dry, no obvious rashes.           Review of Data:   LABS:   Lab Results   Component Value Date/Time    WBC 6.8 04/04/2017 10:10 AM    HGB 13.8 04/04/2017 10:10 AM    HCT 43.9 04/04/2017 10:10 AM    PLATELET 236 78/61/9642 10:10 AM     Lab Results   Component Value Date/Time    Sodium 140 04/04/2017 10:10 AM    Potassium 3.8 04/04/2017 10:10 AM    Chloride 98 04/04/2017 10:10 AM    CO2 23 04/04/2017 10:10 AM    Glucose 117 04/04/2017 10:10 AM    BUN 23 04/04/2017 10:10 AM    Creatinine 1.09 04/04/2017 10:10 AM     Lab Results   Component Value Date/Time    Cholesterol, total 145 04/04/2017 10:10 AM    HDL Cholesterol 59 04/04/2017 10:10 AM    LDL, calculated 65 04/04/2017 10:10 AM    Triglyceride 104 04/04/2017 10:10 AM     No results found for: GPT        Impression / Plan:        ICD-10-CM ICD-9-CM    1. Type 2 diabetes mellitus with diabetic neuropathy, with long-term current use of insulin ([facility]) E11.40 250.60     Z79.4 357.2      V58.67    2. PAD (peripheral artery disease) ([facility]) I73.9 443.9    3. Essential hypertension I10 401.9    4. Hypercholesteremia E78.00 272.0    5. Coronary artery disease involving native coronary artery of native heart without angina pectoris I25.10 414.01      DM /HTN/Hyperchol/ CAd -stable    Allergic rhinitis- pt declined meds    PAD - follow up with vascular    Dm eye exam done at the [place] 6 months back      Explained to patient risk benefits of the medications. Advised patient to stop meds if having any side effects. Pt verbalized understanding of the instructions. I have discussed the diagnosis with the patient and the intended plan as seen in the above orders. The patient has received an after-visit summary and questions were answered concerning future plans. I have discussed medication side effects and warnings with the patient as well. I have reviewed the plan of care with the patient, accepted their input and they are in agreement with the treatment goals. Reviewed plan of care. Patient has provided input and agrees with goals.     Follow-up Disposition: Not on Glorine MD Mariposa

## 2017-07-26 NOTE — MR AVS SNAPSHOT
Visit Information Date & Time Provider Department Dept. Phone Encounter #  
 7/26/2017 10:30 AM [id number] We[address], [facility] [phone] [id number] Follow-up Instructions Return in about 3 months (around 10/26/2017). Your Appointments 10/11/2017  1:00 PM  
[place] with [facility] ([facility]) Appt Note: 3 month carelink [place] [phone]  
[phone] [place] [phone]  
  
    
 1/10/2018  1:40 PM  
[facility] ([facility]) Appt Note: 6 month carelink [place] [phone]  
[phone] 4/11/2018  1:20 PM  
[facility] ([facility]) Appt Note: 9 month carelink [place] [phone]  
[phone] Upcoming Health Maintenance Date Due  
 EYE EXAM RETINAL OR DILATED Q1 1/29/1965 INFLUENZA AGE 9 TO ADULT 8/1/2017 HEMOGLOBIN A1C Q6M 10/4/2017 FOOT EXAM Q1 4/4/2018 MICROALBUMIN Q1 4/4/2018 LIPID PANEL Q1 4/4/2018 MEDICARE YEARLY EXAM 4/5/2018 COLONOSCOPY 2/19/2025 DTaP/Tdap/Td series (2 - Td) 4/4/2027 Allergies as of 7/26/2017  Review Complete On: 7/26/2017 By: [id number] Lorri Weinberg MD  
  
 Severity Noted Reaction Type Reactions Lasix [Furosemide]  05/16/2016    Other (comments) Levofloxacin  05/25/2016    Rash Penicillins  11/28/2015    Swelling Current Immunizations  Reviewed on 10/20/2016 Name Date Influenza Vaccine (Quad) PF 10/20/2016 Pneumococcal Vaccine (Unspecified Type) 1/1/2013 Not reviewed this visit You Were Diagnosed With   
  
 Codes Comments  Type 2 diabetes mellitus with diabetic neuropathy, with long-term current use of insulin (HCC)    -  Primary ICD-10-CM: E11.40, Z79.4 ICD-9-CM: 250.60, 357.2, V58.67 PAD (peripheral artery disease) (HCC)     ICD-10-CM: I73.9 ICD-9-CM: 443.9 Essential hypertension     ICD-10-CM: I10 
ICD-9-CM: 401.9 Hypercholesteremia     ICD-10-CM: E78.00 ICD-9-CM: 272.0 Coronary artery disease involving native coronary artery of native heart without angina pectoris     ICD-10-CM: I25.10 ICD-9-CM: 414.01 Vitals BP Pulse Temp Resp Height(growth percentile) Weight(growth percentile) 110/73 (BP 1 Location: Left arm, BP Patient Position: Sitting) 81 97.6 °F (36.4 °C) (Oral) 15 5' 11\" (1.803 m) 231 lb 6.4 oz (105 kg) SpO2 BMI Smoking Status 94% 32.27 kg/m2 Former Smoker Vitals History BMI and BSA Data Body Mass Index Body Surface Area  
 32.27 kg/m 2 2.29 m 2 Preferred Pharmacy Pharmacy Name Phone [facility] - [address], [address] [phone] Your Updated Medication List  
  
   
This list is accurate as of: 7/26/17 11:13 AM.  Always use your most recent med list.  
  
  
  
  
 acetaminophen 500 mg tablet Commonly known as:  [address] Take 2 Tabs by mouth every six (6) hours as needed for Pain. albuterol 90 mcg/actuation inhaler Commonly known as:  PROVENTIL HFA, VENTOLIN HFA, PROAIR HFA Take 2 Puffs by inhalation every six (6) hours as needed for Wheezing. aspirin 81 mg chewable tablet Take 1 Tab by mouth daily. Indications: MYOCARDIAL INFARCTION PREVENTION  
  
 atorvastatin 80 mg tablet Commonly known as:  LIPITOR Take 1 Tab by mouth nightly. budesonide-formoterol 160-4.5 mcg/actuation HFA inhaler Commonly known as:  SYMBICORT Take 2 Puffs by inhalation two (2) times a day. carvedilol 25 mg tablet Commonly known as:  [place] Pennant Take 12.5 mg by mouth two (2) times daily (with meals). 1/2 tab BID  
  
 furosemide 20 mg tablet Commonly known as:  LASIX  
  
 hydrALAZINE 25 mg tablet Commonly known as:  APRESOLINE Take 1 Tab by mouth three (3) times daily. hydroCHLOROthiazide 25 mg tablet Commonly known as:  HYDRODIURIL Take 1 Tab by mouth daily. LANTUS 100 unit/mL injection Generic drug:  insulin glargine 35 Units by SubCUTAneous route nightly. pt takes 30 Units  
  
 losartan 100 mg tablet Commonly known as:  COZAAR Take 1 Tab by mouth daily. mometasone 50 mcg/actuation nasal spray Commonly known as:  NASONEX  
2 Sprays by Both Nostrils route daily. NIFEdipine ER 60 mg ER tablet Commonly known as:  ADALAT CC Take 1 Tab by mouth daily. PROTONIX 40 mg tablet Generic drug:  pantoprazole Take 40 mg by mouth daily. Follow-up Instructions Return in about 3 months (around 10/26/2017). Introducing [facility] & HEALTH SERVICES! Dear Jr Lozano: Thank you for requesting a Karmarama account. Our records indicate that you already have an active Karmarama account. You can access your account anytime at [email or web address] Did you know that you can access your hospital and ER discharge instructions at any time in Karmarama? You can also review all of your test results from your hospital stay or ER visit. Additional Information If you have questions, please visit the Frequently Asked Questions section of the Karmarama website at [email or web address] Remember, Karmarama is NOT to be used for urgent needs. For medical emergencies, dial 911. Now available from your iPhone and Android! Please provide this summary of care documentation to your next provider. Your primary care clinician is listed as Cata Gamboa. If you have any questions after today's visit, please call [phone].

## 2017-07-26 NOTE — PROGRESS NOTES
1. Have you been to the ER, urgent care clinic since your last visit? Hospitalized since your last visit? No    2. Have you seen or consulted any other health care providers outside of the [address] [place] [address] since your last visit? Include any pap smears or colon screening.  No

## 2017-08-28 ENCOUNTER — TELEPHONE (OUTPATIENT)
Dept: SURGERY | Age: 62
End: 2017-08-28

## 2017-09-01 ENCOUNTER — TELEPHONE (OUTPATIENT)
Dept: SURGERY | Age: 62
End: 2017-09-01

## 2017-09-01 NOTE — TELEPHONE ENCOUNTER
Mr Tj Cherry called and wanted to reschedule his Sept. 07,2017 colonoscopy. He said he will be going out of town. I told him our next available opening will be December, he said that is okay. Procedure was rescheduled and moved to December 14, 2017.

## 2017-09-22 ENCOUNTER — HOSPITAL ENCOUNTER (EMERGENCY)
Age: 62
Discharge: HOME | End: 2017-09-22
Attending: EMERGENCY MEDICINE
Payer: MEDICARE

## 2017-09-22 VITALS
SYSTOLIC BLOOD PRESSURE: 147 MMHG | WEIGHT: 225 LBS | RESPIRATION RATE: 16 BRPM | DIASTOLIC BLOOD PRESSURE: 92 MMHG | TEMPERATURE: 97.9 F | HEART RATE: 84 BPM | BODY MASS INDEX: 31.38 KG/M2 | OXYGEN SATURATION: 95 %

## 2017-09-22 DIAGNOSIS — L03.031 PARONYCHIA OF GREAT TOE OF RIGHT FOOT: Primary | ICD-10-CM

## 2017-09-22 LAB — GLUCOSE BLD STRIP.AUTO-MCNC: 99 MG/DL (ref 70–110)

## 2017-09-22 PROCEDURE — 99283 EMERGENCY DEPT VISIT LOW MDM: CPT

## 2017-09-22 PROCEDURE — 82962 GLUCOSE BLOOD TEST: CPT

## 2017-09-22 PROCEDURE — 75810000289 HC I&D ABSCESS SIMP/COMP/MULT

## 2017-09-22 RX ORDER — SULFAMETHOXAZOLE AND TRIMETHOPRIM 800; 160 MG/1; MG/1
1 TABLET ORAL 2 TIMES DAILY
Qty: 14 TAB | Refills: 0 | Status: SHIPPED | OUTPATIENT
Start: 2017-09-22 | End: 2017-09-27

## 2017-09-22 RX ORDER — MUPIROCIN 20 MG/G
OINTMENT TOPICAL 3 TIMES DAILY
Qty: 22 G | Refills: 0 | Status: SHIPPED | OUTPATIENT
Start: 2017-09-22 | End: 2018-04-10

## 2017-09-22 RX ORDER — CEPHALEXIN 500 MG/1
500 CAPSULE ORAL 4 TIMES DAILY
Qty: 20 CAP | Refills: 0 | Status: SHIPPED | OUTPATIENT
Start: 2017-09-22 | End: 2017-09-27

## 2017-09-22 NOTE — DISCHARGE INSTRUCTIONS
Paronychia: Care Instructions  Your Care Instructions  Paronychia (say \"wtrr-xz-EY-leslee-uh\") is an infection of the skin around a fingernail or toenail. It happens when germs enter through a break in the skin. The doctor may have made a small cut in the infected area to drain the pus. Most cases of paronychia improve in a few days. But watch your symptoms and follow your doctor's advice. Though rare, a mild case can turn into something more serious and infect your entire finger or toe. Also, it is possible for an infection to return. Follow-up care is a key part of your treatment and safety. Be sure to make and go to all appointments, and call your doctor if you are having problems. It's also a good idea to know your test results and keep a list of the medicines you take. How can you care for yourself at home? · If your doctor told you how to care for your infected nail, follow the doctor's instructions. If you did not get instructions, follow this general advice:  ¨ Wash the area with clean water 2 times a day. Don't use hydrogen peroxide or alcohol, which can slow healing. ¨ You may cover the area with a thin layer of petroleum jelly, such as Vaseline, and a nonstick bandage. ¨ Apply more petroleum jelly and replace the bandage as needed. · If your doctor prescribed antibiotics, take them as directed. Do not stop taking them just because you feel better. You need to take the full course of antibiotics. · Take an over-the-counter pain medicine, such as acetaminophen (Tylenol), ibuprofen (Advil, Motrin), or naproxen (Aleve). Read and follow all instructions on the label. · Do not take two or more pain medicines at the same time unless the doctor told you to. Many pain medicines have acetaminophen, which is Tylenol. Too much acetaminophen (Tylenol) can be harmful. · Prop up the toe or finger so that it is higher than the level of your heart. This will help with pain and swelling. · Apply heat.  Put a warm water bottle, heating pad set on low, or warm cloth on your finger or toe. Do not go to sleep with a heating pad on your skin. · Soak the area in warm water twice a day for 15 minutes each time. After soaking, dry the area well and apply a thin layer of petroleum jelly, such as Vaseline. Put on a new bandage. When should you call for help? Call your doctor now or seek immediate medical care if:  · You have signs of new or worsening infection, such as:  ¨ Increased pain, swelling, warmth, or redness. ¨ Red streaks leading from the infected skin. ¨ Pus draining from the area. ¨ A fever. Watch closely for changes in your health, and be sure to contact your doctor if:  · You do not get better as expected. Where can you learn more? Go to http://teddy-ac.info/. Enter C435 in the search box to learn more about \"Paronychia: Care Instructions. \"  Current as of: October 13, 2016  Content Version: 11.3  © 5823-9280 [employer]. Care instructions adapted under license by [employer] (which disclaims liability or warranty for this information). If you have questions about a medical condition or this instruction, always ask your healthcare professional. [address] any warranty or liability for your use of this information.

## 2017-09-22 NOTE — ED TRIAGE NOTES
Wore new sandels  yesterday that rubbed top of toes. Right big toe has infected cuticle with pus. 2nd toe red and swollen .  Pt is diabetic

## 2017-09-22 NOTE — ED PROVIDER NOTES
HPI Comments: 4:43 PM  Kimberley Vee is a 60 y.o. male with PMHx of DM presents to ED c/o right great toe cuticle infection with purulent drainage and right 2nd toe erythema and swelling onset 1 day ago. Pain is constant and is 8/10 in severity, and is exacerbated by palpation. Pt states that he wore new sandals yesterday which rubbed the top of his right toes. Pt denies any other sxs or complaints. The history is provided by the patient. No  was used. Past Medical History:   Diagnosis Date    Biventricular ICD (implantable cardioverter-defibrillator) in place 07/16    [employer]    CAD (coronary artery disease)     Cardiomyopathy, ischemic     EF 30% (04/16) 30-35% (11/15)    [facility]     [facility] 08[facility]16    L axillary vein after PPM insertion    HLD (hyperlipidemia)     Hypertension     NSTEMI (non-ST elevated myocardial infarction) ([facility])     S/P OM1 2.5 X 23 mm XIENCE (11/15)    Pulmonary emphysema ([facility])     Stroke [facility])        Past Surgical History:   Procedure Laterality Date    HX PACEMAKER PLACEMENT      VASCULAR SURGERY PROCEDURE UNLIST      Stent placed right thigh         Family History:   Problem Relation Age of Onset    Heart Disease Mother     Heart Disease Father        Social History     Social History    Marital status: UNKNOWN     Spouse name: N/A    Number of children: N/A    Years of education: N/A     Occupational History    Not on file. Social History Main Topics    Smoking status: Former Smoker     Packs/day: 1.50     Years: 30.00     Types: Cigarettes     Quit date: 11/13/2015    Smokeless tobacco: Never Used    Alcohol use No    Drug use: No    Sexual activity: No     Other Topics Concern    Not on file     Social History Narrative         ALLERGIES: Lasix [furosemide]; Levofloxacin; and Penicillins    Review of Systems   Constitutional: Negative. Negative for chills, diaphoresis and fever.    HENT: Negative. Negative for congestion, ear pain, sore throat and trouble swallowing. Eyes: Negative. Negative for photophobia, pain, redness and visual disturbance. Respiratory: Negative. Negative for cough, chest tightness, shortness of breath and wheezing. Cardiovascular: Negative. Negative for chest pain and palpitations. Gastrointestinal: Negative. Negative for abdominal pain, blood in stool, diarrhea, nausea and vomiting. Genitourinary: Negative for dysuria and frequency. Musculoskeletal: Negative. Negative for back pain, joint swelling and neck pain. Skin: Positive for color change (Right big toe infected cuticle with pus. 2nd toe red and swollen. ). Neurological: Negative. Negative for seizures, syncope and headaches. Psychiatric/Behavioral: Negative. Negative for behavioral problems and confusion. The patient is not nervous/anxious. All other systems reviewed and are negative. Vitals:    09/22/17 1640   BP: (!) 147/92   Pulse: 84   Resp: 16   Temp: 97.9 °F (36.6 °C)   SpO2: 95%   Weight: 102.1 kg (225 lb)            Physical Exam   Constitutional: He is oriented to person, place, and time. He appears well-developed and well-nourished. No distress. HENT:   Head: Normocephalic and atraumatic. Mouth/Throat: Oropharynx is clear and moist.   Eyes: Conjunctivae and EOM are normal. Pupils are equal, round, and reactive to light. No scleral icterus. Neck: Normal range of motion. Neck supple. Cardiovascular: Normal rate, regular rhythm and normal heart sounds. No murmur heard. Pulmonary/Chest: Effort normal and breath sounds normal. No respiratory distress. Abdominal: Soft. Bowel sounds are normal. He exhibits no distension. There is no tenderness. Musculoskeletal: He exhibits no edema. Lymphadenopathy:     He has no cervical adenopathy. Neurological: He is alert and oriented to person, place, and time. Coordination normal.   Skin: Skin is warm and dry.    Generalized fungal infection to all toes with an increased infection on right great toe. Erythema and fluctuance at nail bed consistent with paronychia. Psychiatric: He has a normal mood and affect. His behavior is normal.   Nursing note and vitals reviewed. MDM  Number of Diagnoses or Management Options  Paronychia of great toe of right foot:   Diagnosis management comments: R great toe paronychia with I & D and abx started     ED Course       I&D Abcess Simple  Date/Time: 9/22/2017 5:07 PM  Performed by: Severo Loose  Authorized by: Severo Loose     Consent:     Consent obtained:  Verbal    Consent given by:  Patient    Risks discussed:  Infection  Location:     Indications for incision and drainage: infection. Location: Right great toe. Anesthesia (see MAR for exact dosages): Anesthesia method:  None  Procedure type:     Complexity:  Simple  Procedure details:     Techniques: cleaned. Drainage:  Purulent    Drainage amount:  Scant    Wound treatment: Bandaid. Post-procedure details:     Patient tolerance of procedure: Tolerated well, no immediate complications  Comments:      Wound was opened with pus removed. Incision and drainage. Wound was cleaned. Vitals:  Patient Vitals for the past 12 hrs:   Temp Pulse Resp BP SpO2   09/22/17 1640 97.9 °F (36.6 °C) 84 16 (!) 147/92 95 %       Medications Ordered:  Medications - No data to display    Lab Findings:  Recent Results (from the past 12 hour(s))   GLUCOSE, POC    Collection Time: 09/22/17  4:43 PM   Result Value Ref Range    Glucose (POC) 99 70 - 110 mg/dL       X-ray, CT or radiology findings or impressions:  No orders to display         Diagnosis:   1.  Paronychia of great toe of right foot        Disposition: DC    Follow-up Information     Follow up With Details Comments Contact [facility] EMERGENCY DEPT  As needed, If symptoms worsen [address]  [facility] [address]    Debra Waggoner MD Schedule an appointment as soon as possible for a visit for ED follow up appointment  [address] [address] Schedule an appointment as soon as possible for a visit for ED follow up appointment    [address] and [address]  [phone]             Patient's Medications   Start Taking    CEPHALEXIN (KEFLEX) 500 MG CAPSULE    Take 1 Cap by mouth four (4) times daily for 5 days. MUPIROCIN (BACTROBAN) 2 % OINTMENT    Apply  to affected area three (3) times daily. Apply to area for 10 days    TRIMETHOPRIM-SULFAMETHOXAZOLE (BACTRIM DS) 160-800 MG PER TABLET    Take 1 Tab by mouth two (2) times a day for 5 days. Continue Taking    ACETAMINOPHEN (TYLENOL EXTRA STRENGTH) 500 MG TABLET    Take 2 Tabs by mouth every six (6) hours as needed for Pain. ALBUTEROL (PROVENTIL HFA, VENTOLIN HFA, PROAIR HFA) 90 MCG/ACTUATION INHALER    Take 2 Puffs by inhalation every six (6) hours as needed for Wheezing. ASPIRIN 81 MG CHEWABLE TABLET    Take 1 Tab by mouth daily. Indications: MYOCARDIAL INFARCTION PREVENTION    ATORVASTATIN (LIPITOR) 80 MG TABLET    Take 1 Tab by mouth nightly. BUDESONIDE-FORMOTEROL (SYMBICORT) 160-4.5 MCG/ACTUATION HFA INHALER    Take 2 Puffs by inhalation two (2) times a day. CARVEDILOL (COREG) 25 MG TABLET    Take 12.5 mg by mouth two (2) times daily (with meals). 1/2 tab BID    FUROSEMIDE (LASIX) 20 MG TABLET        HYDRALAZINE (APRESOLINE) 25 MG TABLET    Take 1 Tab by mouth three (3) times daily. HYDROCHLOROTHIAZIDE (HYDRODIURIL) 25 MG TABLET    Take 1 Tab by mouth daily. INSULIN GLARGINE (LANTUS) 100 UNIT/ML INJECTION    35 Units by SubCUTAneous route nightly. pt takes 30 Units     LOSARTAN (COZAAR) 100 MG TABLET    Take 1 Tab by mouth daily. MOMETASONE (NASONEX) 50 MCG/ACTUATION NASAL SPRAY    2 Sprays by Both Nostrils route daily.     NIFEDIPINE ER (ADALAT CC) 60 MG ER TABLET    Take 1 Tab by mouth daily. PANTOPRAZOLE (PROTONIX) 40 MG TABLET    Take 40 mg by mouth daily. These Medications have changed    No medications on file   Stop Taking    No medications on file       Scribe Attestation      Hali Cuevas acting as a scribe for and in the presence of Abdoul Walters MD   September 22, 2017 at 4:46 PM       Provider Attestation:      I personally performed the services described in the documentation, reviewed the documentation, as recorded by the scribe in my presence, and it accurately and completely records my words and actions.  September 22, 2017 at 4:46 PM - Abdoul Walters MD

## 2017-10-11 ENCOUNTER — OFFICE VISIT (OUTPATIENT)
Dept: CARDIOLOGY CLINIC | Age: 62
End: 2017-10-11

## 2017-10-11 DIAGNOSIS — Z95.810 AICD (AUTOMATIC CARDIOVERTER/DEFIBRILLATOR) PRESENT: ICD-10-CM

## 2017-10-11 DIAGNOSIS — I50.42 CHRONIC COMBINED SYSTOLIC AND DIASTOLIC HEART FAILURE (HCC): Primary | ICD-10-CM

## 2017-10-11 NOTE — PROGRESS NOTES
I have personally seen and evaluated the device findings. Interrogation reviewed and I agree with assessment.     Nancy Montanez

## 2017-10-26 ENCOUNTER — HOSPITAL ENCOUNTER (OUTPATIENT)
Dept: LAB | Age: 62
Discharge: HOME | End: 2017-10-26

## 2017-10-26 ENCOUNTER — OFFICE VISIT (OUTPATIENT)
Dept: FAMILY MEDICINE CLINIC | Age: 62
End: 2017-10-26

## 2017-10-26 VITALS
DIASTOLIC BLOOD PRESSURE: 80 MMHG | TEMPERATURE: 96.9 F | SYSTOLIC BLOOD PRESSURE: 131 MMHG | HEART RATE: 66 BPM | OXYGEN SATURATION: 95 % | BODY MASS INDEX: 32.7 KG/M2 | HEIGHT: 71 IN | RESPIRATION RATE: 20 BRPM | WEIGHT: 233.6 LBS

## 2017-10-26 DIAGNOSIS — I73.9 PAD (PERIPHERAL ARTERY DISEASE) (HCC): ICD-10-CM

## 2017-10-26 DIAGNOSIS — I25.10 CORONARY ARTERY DISEASE INVOLVING NATIVE CORONARY ARTERY OF NATIVE HEART WITHOUT ANGINA PECTORIS: ICD-10-CM

## 2017-10-26 DIAGNOSIS — Z23 ENCOUNTER FOR IMMUNIZATION: ICD-10-CM

## 2017-10-26 DIAGNOSIS — E11.40 TYPE 2 DIABETES MELLITUS WITH DIABETIC NEUROPATHY, WITH LONG-TERM CURRENT USE OF INSULIN (HCC): Primary | ICD-10-CM

## 2017-10-26 DIAGNOSIS — B35.1 ONYCHOMYCOSIS: ICD-10-CM

## 2017-10-26 DIAGNOSIS — I10 ESSENTIAL HYPERTENSION: ICD-10-CM

## 2017-10-26 DIAGNOSIS — Z79.4 TYPE 2 DIABETES MELLITUS WITH DIABETIC NEUROPATHY, WITH LONG-TERM CURRENT USE OF INSULIN (HCC): Primary | ICD-10-CM

## 2017-10-26 DIAGNOSIS — E78.00 HYPERCHOLESTEREMIA: ICD-10-CM

## 2017-10-26 PROCEDURE — 99001 SPECIMEN HANDLING PT-LAB: CPT | Performed by: INTERNAL MEDICINE

## 2017-10-26 RX ORDER — TERBINAFINE HYDROCHLORIDE 250 MG/1
250 TABLET ORAL DAILY
Qty: 30 TAB | Refills: 2 | Status: SHIPPED | OUTPATIENT
Start: 2017-10-26 | End: 2018-01-19 | Stop reason: SDUPTHER

## 2017-10-26 RX ORDER — ASPIRIN 325 MG
TABLET, DELAYED RELEASE (ENTERIC COATED) ORAL
COMMUNITY
End: 2020-03-18

## 2017-10-26 NOTE — MR AVS SNAPSHOT
Visit Information Date & Time Provider Department Dept. Phone Encounter #  
 10/26/2017 10:30 AM [address], [address] [id number] Follow-up Instructions Return in about 2 months (around 12/26/2017). Your Appointments 1/10/2018  1:40 PM  
[facility]: [facility] [address] [address] [address] [phone] [address] 4/11/2018  1:20 PM  
[facility] App[facility] Note: 9 month [facility] [address] [address] [address] [phone] Upcoming Health Maintenance Date Due  
 EYE EXAM RETINAL OR DILATED Q1 1/29/1965 HEMOGLOBIN A1C Q6M 10/4/2017 FOOT EXAM Q1 4/4/2018 MICROALBUMIN Q1 4/4/2018 LIPID PANEL Q1 4/4/2018 MEDICARE YEARLY EXAM 4/5/2018 COLONOSCOPY 2/19/2025 DTaP/Tdap/Td series (2 - Td) 4/4/2027 Allergies as of 10/26/2017  Review Complete On: 10/26/2017 By: [id number] Lorri Weinberg MD  
  
 Severity Noted Reaction Type Reactions Lasix [Furosemide]  05/16/2016    Other (comments) Levofloxacin  05/25/2016    Rash Penicillins  11/28/2015    Swelling Current Immunizations  Reviewed on 10/20/2016 Name Date Influenza Vaccine (Quad) PF 10/20/2016 Pneumococcal Vaccine (Unspecified Type) 1/1/2013 Not reviewed this visit You Were Diagnosed With   
  
 Codes Comments Type 2 diabetes mellitus with diabetic neuropathy, with long-term current use of insulin (HCC)    -  Primary ICD-10-CM: E11.40, Z79.4 ICD-9-CM: 250.60, 357.2, V58.67 PAD (peripheral artery disease) (HCC)     ICD-10-CM: I73.9 ICD-9-CM: 443.9 Onychomycosis     ICD-10-CM: B35.1 ICD-9-CM: 110.1 Coronary artery disease involving native coronary artery of native heart without angina pectoris     ICD-10-CM: I25.10 ICD-9-CM: 414.01 Essential hypertension     ICD-10-CM: I10 
ICD-9-CM: 401.9 Hypercholesteremia     ICD-10-CM: E78.00 ICD-9-CM: 272.0 Vitals BP Pulse Temp Resp Height(growth percentile) Weight(growth percentile) 131/80 (BP 1 Location: Right arm, BP Patient Position: At rest) 66 96.9 °F (36.1 °C) (Oral) 20 5' 11\" (1.803 m) 233 lb 9.6 oz (106 kg) SpO2 BMI Smoking Status 95% 32.58 kg/m2 Former Smoker Vitals History BMI and BSA Data Body Mass Index Body Surface Area 32.58 kg/m 2 2.3 m 2 Preferred Pharmacy Pharmacy Name Phone [facility] - Barbara [place], [address] [phone] Your Updated Medication List  
  
   
This list is accurate as of: 10/26/17 11:33 AM.  Always use your most recent med list.  
  
  
  
  
 acetaminophen 500 mg tablet Commonly known as:  [address] Take 2 Tabs by mouth every six (6) hours as needed for Pain. albuterol 90 mcg/actuation inhaler Commonly known as:  PROVENTIL HFA, VENTOLIN HFA, PROAIR HFA Take 2 Puffs by inhalation every six (6) hours as needed for Wheezing. aspirin 81 mg chewable tablet Take 1 Tab by mouth daily. Indications: MYOCARDIAL INFARCTION PREVENTION  
  
 atorvastatin 80 mg tablet Commonly known as:  LIPITOR Take 1 Tab by mouth nightly. budesonide-formoterol 160-4.5 mcg/actuation Hfaa Commonly known as:  SYMBICORT Take 2 Puffs by inhalation two (2) times a day. carvedilol 25 mg tablet Commonly known as:  Haskel Scarce Take 12.5 mg by mouth two (2) times daily (with meals). 1/2 tab BID Cholecalciferol (Vitamin D3) 50,000 unit Cap Take  by mouth. hydrALAZINE 25 mg tablet Commonly known as:  APRESOLINE Take 1 Tab by mouth three (3) times daily. hydroCHLOROthiazide 25 mg tablet Commonly known as:  HYDRODIURIL Take 1 Tab by mouth daily. LANTUS 100 unit/mL injection Generic drug:  insulin glargine 35 Units by SubCUTAneous route nightly. pt takes 30 Units  
  
 losartan 100 mg tablet Commonly known as:  COZAAR Take 1 Tab by mouth daily. mometasone 50 mcg/actuation nasal spray Commonly known as:  NASONEX  
2 Sprays by Both Nostrils route daily. mupirocin 2 % ointment Commonly known as:  [employer] Apply  to affected area three (3) times daily. Apply to area for 10 days NIFEdipine ER 60 mg ER tablet Commonly known as:  ADALAT CC Take 1 Tab by mouth daily. PROTONIX 40 mg tablet Generic drug:  pantoprazole Take 40 mg by mouth daily. terbinafine HCl 250 mg tablet Commonly known as:  LAMISIL Take 1 Tab by mouth daily. Prescriptions Sent to Pharmacy Refills  
 terbinafine HCl (LAMISIL) 250 mg tablet 2 Sig: Take 1 Tab by mouth daily. Class: Normal  
 Pharmacy: [address] [place] [address], [facility] #: [phone] Route: Oral  
  
Follow-up Instructions Return in about 2 months (around 12/26/2017). To-Do List   
 10/26/2017 Lab:  CBC WITH AUTOMATED DIFF   
  
 10/26/2017 Lab:  HEMOGLOBIN A1C WITH EAG   
  
 10/26/2017 Lab:  LIPID PANEL   
  
 10/26/2017 Lab:  METABOLIC PANEL, COMPREHENSIVE [facility] & HEALTH SERVICES! Dear Demetrio Record: Thank you for requesting a PhilSmile account. Our records indicate that you already have an active PhilSmile account. You can access your account anytime at [email or web address] Did you know that you can access your hospital and ER discharge instructions at any time in PhilSmile? You can also review all of your test results from your hospital stay or ER visit. Additional Information If you have questions, please visit the Frequently Asked Questions section of the Raidarrr website at [email or web address] Remember, Raidarrr is NOT to be used for urgent needs. For medical emergencies, dial 911. Now available from your iPhone and Android! Please provide this summary of care documentation to your next provider. Your primary care clinician is listed as Rebecca Obrien. If you have any questions after today's visit, please call [phone].

## 2017-10-26 NOTE — PROGRESS NOTES
Chief Complaint   Patient presents with    Skin Problem     hand    Medication Refill     1. Have you been to the ER, urgent care clinic since your last visit? Hospitalized since your last visit? No    2. Have you seen or consulted any other health care providers outside of the [address] [place] [address] since your last visit? Include any pap smears or colon screening.  No

## 2017-10-26 NOTE — PROGRESS NOTES
Thomas Montelongo is a 60 y.o.  male and presents with     Chief Complaint   Patient presents with    Skin Problem     hand    Medication Refill    Diabetes    Hypertension    Cholesterol Problem    Nail Problem       Pt has discoloration of nails of his left hand. [facility] but were concerned about giving meds orally due to invol of liver. Pt does not drink , no h/o liver problens  Pt denies chest pain  Or SOB. Pt also has dystrophic nail rt great toe that was ifnected and treated in ER last month. Pt sees vascular for PAD and has appt with Dr ross in 2 weeks. Pt says he has been taking meds for DM and HTN        Past Medical History:   Diagnosis Date    Biventricular ICD (implantable cardioverter-defibrillator) in place 07/16    [employer]    CAD (coronary artery disease)     Cardiomyopathy, ischemic     EF 30% (04/16) 30-35% (11/15)    Diabetes ([address]     DVT [facility] thrombosis) [facility] 08/16    L axillary vein after PPM insertion    HLD (hyperlipidemia)     Hypertension     NSTEMI (non-ST elevated myocardial infarction) ([address]     S/P OM1 2.5 X 23 mm XIENCE (11/15)    Pulmonary emphysema ([facility]     Stroke [facility])      Past Surgical History:   Procedure Laterality Date    HX PACEMAKER PLACEMENT      VASCULAR SURGERY PROCEDURE UNLIST      Stent placed right thigh     Current Outpatient Prescriptions   Medication Sig    Cholecalciferol, Vitamin D3, 50,000 unit cap Take  by mouth.  terbinafine HCl (LAMISIL) 250 mg tablet Take 1 Tab by mouth daily.  mupirocin (BACTROBAN) 2 % ointment Apply  to affected area three (3) times daily. Apply to area for 10 days    albuterol (PROVENTIL HFA, VENTOLIN HFA, PROAIR HFA) 90 mcg/actuation inhaler Take 2 Puffs by inhalation every six (6) hours as needed for Wheezing.  budesonide-formoterol (SYMBICORT) 160-4.5 mcg/actuation HFA inhaler Take 2 Puffs by inhalation two (2) times a day.     acetaminophen (TYLENOL EXTRA STRENGTH) 500 mg tablet Take 2 Tabs by mouth every six (6) hours as needed for Pain.  mometasone (NASONEX) 50 mcg/actuation nasal spray 2 Sprays by Both Nostrils route daily.  pantoprazole (PROTONIX) 40 mg tablet Take 40 mg by mouth daily.  carvedilol (COREG) 25 mg tablet Take 12.5 mg by mouth two (2) times daily (with meals). 1/2 tab BID    NIFEdipine ER (ADALAT CC) 60 mg ER tablet Take 1 Tab by mouth daily.  losartan (COZAAR) 100 mg tablet Take 1 Tab by mouth daily.  hydrochlorothiazide (HYDRODIURIL) 25 mg tablet Take 1 Tab by mouth daily.  aspirin 81 mg chewable tablet Take 1 Tab by mouth daily. Indications: MYOCARDIAL INFARCTION PREVENTION    atorvastatin (LIPITOR) 80 mg tablet Take 1 Tab by mouth nightly.  hydrALAZINE (APRESOLINE) 25 mg tablet Take 1 Tab by mouth three (3) times daily.  insulin glargine (LANTUS) 100 unit/mL injection 35 Units by SubCUTAneous route nightly. pt takes 30 Units      No current facility-administered medications for this visit. Health Maintenance   Topic Date Due    EYE EXAM RETINAL OR DILATED Q1  01/29/1965    HEMOGLOBIN A1C Q6M  10/04/2017    FOOT EXAM Q1  04/04/2018    MICROALBUMIN Q1  04/04/2018    LIPID PANEL Q1  04/04/2018    MEDICARE YEARLY EXAM  04/05/2018    COLONOSCOPY  02/19/2025    DTaP/Tdap/Td series (2 - Td) 04/04/2027    Hepatitis C Screening  Completed    ZOSTER VACCINE AGE 60>  Completed    Pneumococcal 19-64 Medium Risk  Completed    INFLUENZA AGE 9 TO ADULT  Completed     Immunization History   Administered Date(s) Administered    Influenza Vaccine (Quad) PF 10/20/2016    Pneumococcal Vaccine (Unspecified Type) 01/01/2013     No LMP for male patient. Allergies and Intolerances:    Allergies   Allergen Reactions    Lasix [Furosemide] Other (comments)    Levofloxacin Rash    Penicillins Swelling       Family History:   Family History   Problem Relation Age of Onset    Heart Disease Mother    Micky Nielson Heart Disease Father        Social History:   He  reports that he quit smoking about 1 years ago. His smoking use included Cigarettes. He has a 45.00 pack-year smoking history. He has never used smokeless tobacco.  He  reports that he does not drink alcohol. Review of Systems:   General: negative for - chills, fatigue, fever, weight change  Psych: negative for - anxiety, depression, irritability or mood swings  ENT: negative for - headaches, hearing change, nasal congestion, oral lesions, sneezing or sore throat  Heme/ Lymph: negative for - bleeding problems, bruising, pallor or swollen lymph nodes  Endo: negative for - hot flashes, polydipsia/polyuria or temperature intolerance  Resp: negative for - cough, shortness of breath or wheezing  CV: negative for - chest pain, edema or palpitations  GI: negative for - abdominal pain, change in bowel habits, constipation, diarrhea or nausea/vomiting  : negative for - dysuria, hematuria, incontinence, pelvic pain or vulvar/vaginal symptoms  MSK: negative for - joint pain, joint swelling or muscle pain  Neuro: negative for - confusion, headaches, seizures or weakness  Derm: negative for - dry skin, hair changes, rash or skin lesion changes, pso for emily lchanhges          Physical:   Vitals:   Vitals:    10/26/17 1048   BP: 131/80   Pulse: 66   Resp: 20   Temp: 96.9 °F (36.1 °C)   TempSrc: Oral   SpO2: 95%   Weight: 233 lb 9.6 oz (106 kg)   Height: 5' 11\" (1.803 m)           Exam:   HEENT- atraumatic,normocephalic, awake, oriented, well nourished  Neck - supple,no enlarged lymph nodes, no JVD, no thyromegaly  Chest- CTA, no rhonchi, no crackles  Heart- rrr, no murmurs / gallop/rub  Abdomen- soft,BS+,NT, no hepatosplenomegaly  Ext - no c/c/edema , superfical whie oncychomycosis of nails of left hand  Neuro- no focal deficits. Power 5/5 all extremities  Skin - warm,dry, no obvious rashes. , dystrophic nail rt great toe, poor DP, PT          Review of Data:   LABS: Lab Results   Component Value Date/Time    WBC 6.8 04/04/2017 10:10 AM    HGB 13.8 04/04/2017 10:10 AM    HCT 43.9 04/04/2017 10:10 AM    PLATELET 977 33/71/2297 10:10 AM     Lab Results   Component Value Date/Time    Sodium 140 04/04/2017 10:10 AM    Potassium 3.8 04/04/2017 10:10 AM    Chloride 98 04/04/2017 10:10 AM    CO2 23 04/04/2017 10:10 AM    Glucose 117 04/04/2017 10:10 AM    BUN 23 04/04/2017 10:10 AM    Creatinine 1.09 04/04/2017 10:10 AM     Lab Results   Component Value Date/Time    Cholesterol, total 145 04/04/2017 10:10 AM    HDL Cholesterol 59 04/04/2017 10:10 AM    LDL, calculated 65 04/04/2017 10:10 AM    Triglyceride 104 04/04/2017 10:10 AM     No results found for: GPT        Impression / Plan:        ICD-10-CM ICD-9-CM    1. Type 2 diabetes mellitus with diabetic neuropathy, with long-term current use of insulin ([facility]) E11.40 250.60 HEMOGLOBIN A1C WITH EAG    Z79.4 357.2 LIPID PANEL     Z38.54 METABOLIC PANEL, COMPREHENSIVE      CBC WITH AUTOMATED DIFF   2. PAD (peripheral artery disease) ([facility]) I73.9 443.9    3. Onychomycosis B35.1 110.1 terbinafine HCl (LAMISIL) 250 mg tablet   4. Coronary artery disease involving native coronary artery of native heart without angina pectoris I25.10 414.01    5. Essential hypertension I10 401.9    6. Hypercholesteremia E78.00 272.0      PAD - has appt with Dr Debora Beebe, may need repeat dopplers. Dystrophic rt great toie nail - [facility]    CAD/DM/ HTN  - stable    Hearing impairment - wears hearing aids. , may need new batteries. Explained to patient risk benefits of the medications. Advised patient to stop meds if having any side effects. Pt verbalized understanding of the instructions. I have discussed the diagnosis with the patient and the intended plan as seen in the above orders. The patient has received an after-visit summary and questions were answered concerning future plans.   I have discussed medication side effects and warnings with the patient as well. I have reviewed the plan of care with the patient, accepted their input and they are in agreement with the treatment goals. Reviewed plan of care. Patient has provided input and agrees with goals.     Follow-up Disposition: Not on Ligialatosha Claros MD

## 2017-10-27 LAB
ALBUMIN SERPL-MCNC: 4 G/DL (ref 3.6–4.8)
ALBUMIN/GLOB SERPL: 1.4 {RATIO} (ref 1.2–2.2)
ALP SERPL-CCNC: 93 IU/L (ref 39–117)
ALT SERPL-CCNC: 18 IU/L (ref 0–44)
AST SERPL-CCNC: 20 IU/L (ref 0–40)
BASOPHILS # BLD AUTO: 0 X10E3/UL (ref 0–0.2)
BASOPHILS NFR BLD AUTO: 1 %
BILIRUB SERPL-MCNC: 0.3 MG/DL (ref 0–1.2)
BUN SERPL-MCNC: 22 MG/DL (ref 8–27)
BUN/CREAT SERPL: 19 (ref 10–24)
CALCIUM SERPL-MCNC: 9.8 MG/DL (ref 8.6–10.2)
CHLORIDE SERPL-SCNC: 102 MMOL/L (ref 96–106)
CHOLEST SERPL-MCNC: 170 MG/DL (ref 100–199)
CO2 SERPL-SCNC: 25 MMOL/L (ref 18–29)
CREAT SERPL-MCNC: 1.18 MG/DL (ref 0.76–1.27)
EOSINOPHIL # BLD AUTO: 0.4 X10E3/UL (ref 0–0.4)
EOSINOPHIL NFR BLD AUTO: 6 %
ERYTHROCYTE [DISTWIDTH] IN BLOOD BY AUTOMATED COUNT: 14 % (ref 12.3–15.4)
EST. AVERAGE GLUCOSE BLD GHB EST-MCNC: 131 MG/DL
GFR SERPLBLD CREATININE-BSD FMLA CKD-EPI: 65 ML/MIN/1.73
GFR SERPLBLD CREATININE-BSD FMLA CKD-EPI: 75 ML/MIN/1.73
GLOBULIN SER CALC-MCNC: 2.9 G/DL (ref 1.5–4.5)
GLUCOSE SERPL-MCNC: 106 MG/DL (ref 65–99)
HBA1C MFR BLD: 6.2 % (ref 4.8–5.6)
HCT VFR BLD AUTO: 44.2 % (ref 37.5–51)
HDLC SERPL-MCNC: 57 MG/DL
HGB BLD-MCNC: 14.3 G/DL (ref 12.6–17.7)
IMM GRANULOCYTES # BLD: 0 X10E3/UL (ref 0–0.1)
IMM GRANULOCYTES NFR BLD: 0 %
INTERPRETATION, 910389: NORMAL
LDLC SERPL CALC-MCNC: 89 MG/DL (ref 0–99)
LYMPHOCYTES # BLD AUTO: 2.8 X10E3/UL (ref 0.7–3.1)
LYMPHOCYTES NFR BLD AUTO: 43 %
MCH RBC QN AUTO: 27.2 PG (ref 26.6–33)
MCHC RBC AUTO-ENTMCNC: 32.4 G/DL (ref 31.5–35.7)
MCV RBC AUTO: 84 FL (ref 79–97)
MONOCYTES # BLD AUTO: 0.6 X10E3/UL (ref 0.1–0.9)
MONOCYTES NFR BLD AUTO: 10 %
NEUTROPHILS # BLD AUTO: 2.6 X10E3/UL (ref 1.4–7)
NEUTROPHILS NFR BLD AUTO: 40 %
PLATELET # BLD AUTO: 167 X10E3/UL (ref 150–379)
POTASSIUM SERPL-SCNC: 3.8 MMOL/L (ref 3.5–5.2)
PROT SERPL-MCNC: 6.9 G/DL (ref 6–8.5)
RBC # BLD AUTO: 5.26 X10E6/UL (ref 4.14–5.8)
SODIUM SERPL-SCNC: 143 MMOL/L (ref 134–144)
TRIGL SERPL-MCNC: 120 MG/DL (ref 0–149)
VLDLC SERPL CALC-MCNC: 24 MG/DL (ref 5–40)
WBC # BLD AUTO: 6.4 X10E3/UL (ref 3.4–10.8)

## 2017-12-11 ENCOUNTER — TELEPHONE (OUTPATIENT)
Dept: SURGERY | Age: 62
End: 2017-12-11

## 2017-12-11 RX ORDER — POLYETHYLENE GLYCOL 3350, SODIUM SULFATE ANHYDROUS, SODIUM BICARBONATE, SODIUM CHLORIDE, POTASSIUM CHLORIDE 236; 22.74; 6.74; 5.86; 2.97 G/4L; G/4L; G/4L; G/4L; G/4L
POWDER, FOR SOLUTION ORAL
Qty: 1 BOTTLE | Refills: 0 | Status: SHIPPED | OUTPATIENT
Start: 2017-12-11 | End: 2017-12-14

## 2017-12-11 NOTE — TELEPHONE ENCOUNTER
Sivakumar Shana Reno is fully aware of his procedure date and time. I also answered some of his prep instruction questions. He said he will be there at 12:30 pm to check in.

## 2017-12-12 ENCOUNTER — TELEPHONE (OUTPATIENT)
Dept: SURGERY | Age: 62
End: 2017-12-12

## 2017-12-12 NOTE — TELEPHONE ENCOUNTER
Received call from patient (via ) regarding prep instructions for upcoming colonoscopy on 12/14/17. Per surgery scheduler, patient was becoming agitated on the phone as she attempted to confirm his colonoscopy. Reviewed in great detail the bowel prep instructions using golytely for an afternoon procedure including dulcolax tabs and clear liquids on 12/13/17 and consuming prep on the am of 12/14/17. Patient verbalized loudly how unhappy he was at having to consume prescription prep on the day of the procedure, rather than the night before. Explained to patient that due to procedure being at [facility] he should follow the written instruction and take the prep at 6 AM and 9 AM.    Patient became biligerant and stated repeatedly that he was going to go to the [place] to have colonoscopy. Advised patient that this was his choice and encouraged him to follow through with getting colonoscopy at the [place]. At the end of the conversation patient stated that he will indeed attend the scheduled colonoscopy 12/14/17. Patient is aware of date/time/location and of prep instructions.

## 2017-12-14 ENCOUNTER — HOSPITAL ENCOUNTER (OUTPATIENT)
Age: 62
Setting detail: OUTPATIENT SURGERY
Discharge: HOME | End: 2017-12-14
Attending: COLON & RECTAL SURGERY | Admitting: COLON & RECTAL SURGERY
Payer: MEDICARE

## 2017-12-14 VITALS
HEART RATE: 72 BPM | WEIGHT: 230 LBS | RESPIRATION RATE: 20 BRPM | OXYGEN SATURATION: 95 % | SYSTOLIC BLOOD PRESSURE: 128 MMHG | BODY MASS INDEX: 32.08 KG/M2 | DIASTOLIC BLOOD PRESSURE: 80 MMHG | TEMPERATURE: 97.9 F

## 2017-12-14 LAB — GLUCOSE BLD STRIP.AUTO-MCNC: 87 MG/DL (ref 70–110)

## 2017-12-14 PROCEDURE — G0500 MOD SEDAT ENDO SERVICE >5YRS: HCPCS | Performed by: COLON & RECTAL SURGERY

## 2017-12-14 PROCEDURE — 74011250636 HC RX REV CODE- 250/636: Performed by: COLON & RECTAL SURGERY

## 2017-12-14 PROCEDURE — 82962 GLUCOSE BLOOD TEST: CPT

## 2017-12-14 PROCEDURE — 77030031670 HC DEV INFL ENDOTEK BIG60 MRTM -B: Performed by: COLON & RECTAL SURGERY

## 2017-12-14 PROCEDURE — 88305 TISSUE EXAM BY PATHOLOGIST: CPT | Performed by: COLON & RECTAL SURGERY

## 2017-12-14 PROCEDURE — 77030009426 HC FCPS BIOP ENDOSC BSC -B: Performed by: COLON & RECTAL SURGERY

## 2017-12-14 PROCEDURE — 76040000019: Performed by: COLON & RECTAL SURGERY

## 2017-12-14 RX ORDER — NALOXONE HYDROCHLORIDE 0.4 MG/ML
0.4 INJECTION, SOLUTION INTRAMUSCULAR; INTRAVENOUS; SUBCUTANEOUS
Status: DISCONTINUED | OUTPATIENT
Start: 2017-12-14 | End: 2017-12-14 | Stop reason: HOSPADM

## 2017-12-14 RX ORDER — SODIUM CHLORIDE 0.9 % (FLUSH) 0.9 %
5-10 SYRINGE (ML) INJECTION AS NEEDED
Status: DISCONTINUED | OUTPATIENT
Start: 2017-12-14 | End: 2017-12-14 | Stop reason: HOSPADM

## 2017-12-14 RX ORDER — SODIUM CHLORIDE 0.9 % (FLUSH) 0.9 %
5-10 SYRINGE (ML) INJECTION EVERY 8 HOURS
Status: DISCONTINUED | OUTPATIENT
Start: 2017-12-14 | End: 2017-12-14 | Stop reason: HOSPADM

## 2017-12-14 RX ORDER — EPINEPHRINE 0.1 MG/ML
1 INJECTION INTRACARDIAC; INTRAVENOUS
Status: DISCONTINUED | OUTPATIENT
Start: 2017-12-14 | End: 2017-12-14 | Stop reason: HOSPADM

## 2017-12-14 RX ORDER — FLUMAZENIL 0.1 MG/ML
0.2 INJECTION INTRAVENOUS
Status: DISCONTINUED | OUTPATIENT
Start: 2017-12-14 | End: 2017-12-14 | Stop reason: HOSPADM

## 2017-12-14 RX ORDER — ATROPINE SULFATE 0.1 MG/ML
0.5 INJECTION INTRAVENOUS
Status: DISCONTINUED | OUTPATIENT
Start: 2017-12-14 | End: 2017-12-14 | Stop reason: HOSPADM

## 2017-12-14 RX ORDER — MIDAZOLAM HYDROCHLORIDE 1 MG/ML
.25-5 INJECTION, SOLUTION INTRAMUSCULAR; INTRAVENOUS
Status: DISCONTINUED | OUTPATIENT
Start: 2017-12-14 | End: 2017-12-14 | Stop reason: HOSPADM

## 2017-12-14 RX ORDER — DEXTROMETHORPHAN/PSEUDOEPHED 2.5-7.5/.8
1.2 DROPS ORAL
Status: DISCONTINUED | OUTPATIENT
Start: 2017-12-14 | End: 2017-12-14 | Stop reason: HOSPADM

## 2017-12-14 RX ORDER — SODIUM CHLORIDE 9 MG/ML
50 INJECTION, SOLUTION INTRAVENOUS CONTINUOUS
Status: DISCONTINUED | OUTPATIENT
Start: 2017-12-14 | End: 2017-12-14 | Stop reason: HOSPADM

## 2017-12-14 RX ADMIN — SODIUM CHLORIDE 50 ML/HR: 900 INJECTION, SOLUTION INTRAVENOUS at 14:05

## 2017-12-14 NOTE — H&P
[facility]  [address] [address], [facility]        Colon and Rectal Surgery History and Physical    Subjective:      Christine Yanes is a 60 y.o. male who was referred by Dr. Barney Medrano for colon screening. His brother  from colon cancer when he was 80 yrs old. He states he had a colon screening through the  [address] but doesn't remember when. The patient denies any rectal bleeding, change in bowel habits, weight changes, nor any abdominal pain. He denies constipation, vomiting, diarrhea, bloody stools, mucousy stools, difficulty swallowing, loss of appetite, reflux and nausea.         Patient Active Problem List    Diagnosis Date Noted    Coronary artery disease involving native coronary artery of native heart without angina pectoris 10/26/2017    Essential hypertension 2017    Type 2 diabetes mellitus with diabetic neuropathy, with long-term current use of insulin ([address] 2016    Deep vein thrombosis (DVT) of left upper extremity ([address] 2016    PAD (peripheral artery disease) ([address] 2016    Hypercholesteremia 2016    Deep venous thrombosis ([address] 2016    Emphysema of lung ([address] 2016    Ex-smoker 2016    Restrictive ventilatory defect 2016    Obesity (BMI 30-39.9) 2016    Chronic combined systolic and diastolic heart failure ([address] 2016    AICD (automatic cardioverter/defibrillator) present 2016    Hearing impairment 2015    Coronary artery disease involving native coronary artery without angina pectoris 2015    ACS (acute coronary syndrome) ([address] 2015    Elevated troponin 2015    HLD (hyperlipidemia) 2015    Hypertension 08/15/2015    Type II or unspecified type diabetes mellitus without mention of complication, not stated as uncontrolled 08/15/2015    Acute lacunar stroke ([address] 08/15/2015     Past Medical History:   Diagnosis Date    Biventricular ICD (implantable cardioverter-defibrillator) in place 07/16    [employer]    CAD (coronary artery disease)     Cardiomyopathy, ischemic     EF 30% (04/16) 30-35% (11/15)    Diabetes [facility]     DVT (deep venous thrombosis) ([facility] 08/16    L axillary vein after PPM insertion    HLD (hyperlipidemia)     Hypertension     NSTEMI (non-ST elevated myocardial infarction) ([facility]     S/P OM1 2.5 X 23 mm XIENCE (11/15)    Pulmonary emphysema ([facility])     Stroke [facility])       Past Surgical History:   Procedure Laterality Date    HX PACEMAKER PLACEMENT      VASCULAR SURGERY PROCEDURE UNLIST      Stent placed right thigh      Social History   Substance Use Topics    Smoking status: Former Smoker     Packs/day: 1.50     Years: 30.00     Types: Cigarettes     Quit date: 11/13/2015    Smokeless tobacco: Never Used    Alcohol use No      Family History   Problem Relation Age of Onset    Heart Disease Mother     Heart Disease Father       Prior to Admission medications    Medication Sig Start Date End Date Taking? Authorizing Provider   PEG 3350-Electrolytes (GO-LYTELY) 236-22.74-6.74 -5.86 gram suspension Take as directed for bowel prep  Indications: BOWEL EVACUATION 12/11/17  Yes Jeffrey Salazar MD   Cholecalciferol, Vitamin D3, 50,000 unit cap Take  by mouth. Yes Historical Provider   terbinafine HCl (LAMISIL) 250 mg tablet Take 1 Tab by mouth daily. 10/26/17  Yes Daina Mauricio MD   budesonide-formoterol (SYMBICORT) 160-4.5 mcg/actuation HFA inhaler Take 2 Puffs by inhalation two (2) times a day. 4/4/17  Yes Daina Mauricio MD   mometasone (NASONEX) 50 mcg/actuation nasal spray 2 Sprays by Both Nostrils route daily. 11/16/16  Yes Daina Mauricio MD   carvedilol (COREG) 25 mg tablet Take 12.5 mg by mouth two (2) times daily (with meals). 1/2 tab BID   Yes Historical Provider   NIFEdipine ER (ADALAT CC) 60 mg ER tablet Take 1 Tab by mouth daily.  1/14/16  Yes Luis Ocasio MD   losartan (COZAAR) 100 mg tablet Take 1 Tab by mouth daily. 12/29/15  Yes Luis Askew MD   hydrochlorothiazide (HYDRODIURIL) 25 mg tablet Take 1 Tab by mouth daily. 12/29/15  Yes Luis Askew MD   atorvastatin (LIPITOR) 80 mg tablet Take 1 Tab by mouth nightly. 11/30/15  Yes Corky Diaz MD   hydrALAZINE (APRESOLINE) 25 mg tablet Take 1 Tab by mouth three (3) times daily. 11/30/15  Yes Corky Diaz MD   insulin glargine (LANTUS) 100 unit/mL injection 35 Units by SubCUTAneous route nightly. pt takes 30 Units    Yes Historical Provider   mupirocin (BACTROBAN) 2 % ointment Apply  to affected area three (3) times daily. Apply to area for 10 days 9/22/17   Yohan Parr MD   albuterol (PROVENTIL HFA, VENTOLIN HFA, PROAIR HFA) 90 mcg/actuation inhaler Take 2 Puffs by inhalation every six (6) hours as needed for Wheezing. 4/4/17   Pop Nguyen MD   acetaminophen (TYLENOL EXTRA STRENGTH) 500 mg tablet Take 2 Tabs by mouth every six (6) hours as needed for Pain. 12/8/16   Melissa Graham MD   pantoprazole (PROTONIX) 40 mg tablet Take 40 mg by mouth daily. Historical Provider   aspirin 81 mg chewable tablet Take 1 Tab by mouth daily.  Indications: MYOCARDIAL INFARCTION PREVENTION 11/30/15   Corky Diaz MD     Current Facility-Administered Medications   Medication Dose Route Frequency    0.9% sodium chloride infusion  50 mL/hr IntraVENous CONTINUOUS    sodium chloride (NS) flush 5-10 mL  5-10 mL IntraVENous Q8H    sodium chloride (NS) flush 5-10 mL  5-10 mL IntraVENous PRN    midazolam (VERSED) injection 0.25-5 mg  0.25-5 mg IntraVENous Multiple    meperidine (DEMEROL) injection 25-50 mg  25-50 mg IntraVENous Multiple    naloxone (NARCAN) injection 0.4 mg  0.4 mg IntraVENous Multiple    flumazenil (ROMAZICON) 0.1 mg/mL injection 0.2 mg  0.2 mg IntraVENous Multiple    simethicone (MYLICON) 76EL/8.6LF oral drops 80 mg  1.2 mL Oral Multiple    atropine injection 0.5 mg  0.5 mg IntraVENous ONCE PRN    EPINEPHrine (ADRENALIN) 0.1 mg/mL syringe 1 mg  1 mg Endoscopically ONCE PRN     Allergies   Allergen Reactions    Lasix [Furosemide] Other (comments)    Levofloxacin Rash    Penicillins Swelling            Objective:     Visit Vitals    /70    Pulse 70    Temp 97.9 °F (36.6 °C)    Resp 16    Wt 104.3 kg (230 lb)    SpO2 94%    BMI 32.08 kg/m2        Physical Exam:   GENERAL: alert, cooperative, no distress, appears stated age  LUNG: clear to auscultation bilaterally  HEART: regular rate and rhythm  ABDOMEN: soft, non-tender. Bowel sounds normal. No masses,  no organomegaly  EXTREMITIES:  extremities normal, atraumatic, no cyanosis or edema       Assessment:     Jodene Spatz is a 60 y.o. male who requires colonoscopy for   Family history of coloretal cancer (screening only). Plan:     1. I recommend proceeding with colonoscopy. The patient was in full agreement and was eager to proceed. 2. I discussed the details of the colonoscopy procedure. The risks of colonoscopy were discussed including colon injury/perforation, anesthesia issues, bleeding, and the possibility of incomplete examination. The patient was willing to accept these risks and proceed with the examination. All questions were answered to the patient's satisfaction.          Luis Norris MD, FACS, FASCRS  Colon and Rectal Surgery  [facility]  Office [phone]  Fax     [phone]  12/14/2017  2:48 PM

## 2017-12-14 NOTE — DISCHARGE INSTRUCTIONS
Learning About Colonoscopy  What is a colonoscopy? A colonoscopy is a test (also called a procedure) that lets a doctor look inside your large intestine. The doctor uses a thin, lighted tube called a colonoscope. The doctor uses it to look for small growths called polyps, colon or rectal cancer (colorectal cancer), or other problems like bleeding. During the procedure, the doctor can take samples of tissue. The samples can then be checked for cancer or other conditions. The doctor can also take out polyps. How is colonoscopy done? This procedure is done in a doctor's office or a clinic or hospital. You will get medicine to help you relax and not feel pain. Some people find that they do not remember having the test because of the medicine. The doctor gently moves the colonoscope, or scope, through the colon. The scope is also a small video camera. It lets the doctor see the colon and take pictures. A colonoscopy usually takes 30 to 45 minutes. It may take longer if the doctor has to remove polyps. How do you prepare for the procedure? You need to clean out your colon before the procedure so the doctor can see all of your colon. You may start the cleaning process a day or two before the test. This depends on which \"colon prep\" your doctor recommends. To clean your colon, you stop eating solid foods and drink only clear liquids. You can have water, tea, coffee, clear juices, clear broths, flavored ice pops, and gelatin (such as Jell-O). Do not drink anything red or purple, such as grape juice or fruit punch. And do not eat red or purple foods, such as grape ice pops or cherry gelatin. The day or night before the procedure, you drink a large amount of a special liquid. This causes loose, frequent stools. You will go to the bathroom a lot. It is very important to drink all of the colon prep liquid. If you have problems drinking the liquid, call your doctor.   For many people, the prep is worse than the test. It may be uncomfortable, and you may feel hungry on the clear liquid diet. Some people do not go to work or do their usual activities on the day of the prep. Arrange to have someone take you home after the test.  What can you expect after a colonoscopy? The nurses will watch you for 1 to 2 hours until the medicines wear off. Then you can go home. You will need a ride. Your doctor will tell you when you can eat and do your usual activities. Your doctor will talk to you about when you will need your next colonoscopy. The results of your test and your risk for colorectal cancer will help your doctor decide how often you need to be checked. Follow-up care is a key part of your treatment and safety. Be sure to make and go to all appointments, and call your doctor if you are having problems. It's also a good idea to know your test results and keep a list of the medicines you take. Where can you learn more? Go to http://teddy-ac.info/. Enter M035 in the search box to learn more about \"Learning About Colonoscopy. \"  Current as of: May 12, 2017  Content Version: 11.4  © 0697-7604 Healthwise, [employer]. Care instructions adapted under license by [employer] (which disclaims liability or warranty for this information). If you have questions about a medical condition or this instruction, always ask your healthcare professional. [address] any warranty or liability for your use of this information. DISCHARGE SUMMARY from Nurse    PATIENT INSTRUCTIONS:    After general anesthesia or intravenous sedation, for 24 hours or while taking prescription Narcotics:  · Limit your activities  · Do not drive and operate hazardous machinery  · Do not make important personal or business decisions  · Do  not drink alcoholic beverages  · If you have not urinated within 8 hours after discharge, please contact your surgeon on call.     Report the following to your surgeon:  · Excessive pain, swelling, redness or odor of or around the surgical area  · Temperature over 100.5  · Nausea and vomiting lasting longer than 4 hours or if unable to take medications  · Any signs of decreased circulation or nerve impairment to extremity: change in color, persistent  numbness, tingling, coldness or increase pain  · Any questions    What to do at Home:  Recommended activity: Activity as tolerated,       *  Please give a list of your current medications to your Primary Care Provider. *  Please update this list whenever your medications are discontinued, doses are      changed, or new medications (including over-the-counter products) are added. *  Please carry medication information at all times in case of emergency situations. These are general instructions for a healthy lifestyle:    No smoking/ No tobacco products/ Avoid exposure to second hand smoke  Surgeon General's Warning:  Quitting smoking now greatly reduces serious risk to your health. Obesity, smoking, and sedentary lifestyle greatly increases your risk for illness    A healthy diet, regular physical exercise & weight monitoring are important for maintaining a healthy lifestyle    You may be retaining fluid if you have a history of heart failure or if you experience any of the following symptoms:  Weight gain of 3 pounds or more overnight or 5 pounds in a week, increased swelling in our hands or feet or shortness of breath while lying flat in bed. Please call your doctor as soon as you notice any of these symptoms; do not wait until your next office visit. Recognize signs and symptoms of STROKE:    F-face looks uneven    A-arms unable to move or move unevenly    S-speech slurred or non-existent    T-time-call 911 as soon as signs and symptoms begin-DO NOT go       Back to bed or wait to see if you get better-TIME IS BRAIN.     Warning Signs of HEART ATTACK     Call 911 if you have these symptoms:   Chest discomfort. Most heart attacks involve discomfort in the center of the chest that lasts more than a few minutes, or that goes away and comes back. It can feel like uncomfortable pressure, squeezing, fullness, or pain.  Discomfort in other areas of the upper body. Symptoms can include pain or discomfort in one or both arms, the back, neck, jaw, or stomach.  Shortness of breath with or without chest discomfort.  Other signs may include breaking out in a cold sweat, nausea, or lightheadedness. Don't wait more than five minutes to call 911 - MINUTES MATTER! Fast action can save your life. Calling 911 is almost always the fastest way to get lifesaving treatment. Emergency Medical Services staff can begin treatment when they arrive -- up to an hour sooner than if someone gets to the hospital by car. The discharge information has been reviewed with the patient. The patient verbalized understanding. Discharge medications reviewed with the patient and appropriate educational materials and side effects teaching were provided.   _Patient armband removed and shredded__________________________________________________________________________________________________________________________________

## 2017-12-14 NOTE — PERIOP NOTES
Jaelyn Leahy, bro in law, is designated point of contact for medical info and confirmed ride [phone]

## 2017-12-14 NOTE — IP AVS SNAPSHOT
[facility] 
 
 
 [address] 
[phone] Patient: Radha Chavez MRN: [id number] VJF:[birth year] About your hospitalization You were admitted on:  December 14, 2017 You last received care in the:  [facility] PHASE 2 RECOVERY You were discharged on:  December 14, 2017 Why you were hospitalized Your primary diagnosis was:  Not on File Things You Need To Do (next 8 weeks) Follow up with Rodrigo Guardado MD  
  
Phone:  [phone] Where:  [address] [facility], [address], [facility] [address] Follow up with MD Dr Gigi Rivas will contact you with results Phone:  [phone] Where:  [address] [facility], [facility], [address] [facility] [address] Wednesday Jacoby 10, 2018 CARELINK with Pacer Hv Csi at  1:40 PM  
Where:  [facility] ([address]) Discharge Orders None A check clifton indicates which time of day the medication should be taken. My Medications STOP taking these medications PEG 3350-Electrolytes 236-22.74-6.74 -5.86 gram suspension Commonly known as:  GO-LYTELY  
   
  
  
TAKE these medications as instructed Instructions Each Dose to Equal  
 Morning Noon Evening Bedtime  
 acetaminophen 500 mg tablet Commonly known as:  [address] [facility] Your last dose was: Your next dose is: Take 2 Tabs by mouth every six (6) hours as needed for Pain. 1000 mg  
    
   
   
   
  
 albuterol 90 mcg/actuation inhaler Commonly known as:  PROVENTIL HFA, VENTOLIN HFA, PROAIR HFA Your last dose was: Your next dose is: Take 2 Puffs by inhalation every six (6) hours as needed for Wheezing. 2 Puff  
    
   
   
   
  
 aspirin 81 mg chewable tablet Your last dose was: Your next dose is: Take 1 Tab by mouth daily.  Indications: MYOCARDIAL INFARCTION PREVENTION  
 81 mg  
    
   
   
   
  
 atorvastatin 80 mg tablet Commonly known as:  LIPITOR Your last dose was: Your next dose is: Take 1 Tab by mouth nightly. 80 mg  
    
   
   
   
  
 budesonide-formoterol 160-4.5 mcg/actuation Hfaa Commonly known as:  SYMBICORT Your last dose was: Your next dose is: Take 2 Puffs by inhalation two (2) times a day. 2 Puff  
    
   
   
   
  
 carvedilol 25 mg tablet Commonly known as:  Kathleamichel Bell Your last dose was: Your next dose is: Take 12.5 mg by mouth two (2) times daily (with meals). 1/2 tab BID  
 12.5 mg Cholecalciferol (Vitamin D3) 50,000 unit Cap Your last dose was: Your next dose is: Take  by mouth. hydrALAZINE 25 mg tablet Commonly known as:  APRESOLINE Your last dose was: Your next dose is: Take 1 Tab by mouth three (3) times daily. 25 mg  
    
   
   
   
  
 hydroCHLOROthiazide 25 mg tablet Commonly known as:  HYDRODIURIL Your last dose was: Your next dose is: Take 1 Tab by mouth daily. 25 mg  
    
   
   
   
  
 LANTUS 100 unit/mL injection Generic drug:  insulin glargine Your last dose was: Your next dose is:    
   
   
 35 Units by SubCUTAneous route nightly. pt takes 30 Units 35 Units  
    
   
   
   
  
 losartan 100 mg tablet Commonly known as:  COZAAR Your last dose was: Your next dose is: Take 1 Tab by mouth daily. 100 mg  
    
   
   
   
  
 mometasone 50 mcg/actuation nasal spray Commonly known as:  Kay Mireles Your last dose was: Your next dose is: 2 Sprays by Both Nostrils route daily. 2 Spray  
    
   
   
   
  
 mupirocin 2 % ointment Commonly known as:  [facility] Your last dose was: Your next dose is: Apply  to affected area three (3) times daily. Apply to area for 10 days NIFEdipine ER 60 mg ER tablet Commonly known as:  ADALAT CC Your last dose was: Your next dose is: Take 1 Tab by mouth daily. 60 mg PROTONIX 40 mg tablet Generic drug:  pantoprazole Your last dose was: Your next dose is: Take 40 mg by mouth daily. 40 mg  
    
   
   
   
  
 terbinafine HCl 250 mg tablet Commonly known as:  LAMISIL Your last dose was: Your next dose is: Take 1 Tab by mouth daily. 250 mg Discharge Instructions Learning About Colonoscopy What is a colonoscopy? A colonoscopy is a test (also called a procedure) that lets a doctor look inside your large intestine. The doctor uses a thin, lighted tube called a colonoscope. The doctor uses it to look for small growths called polyps, colon or rectal cancer (colorectal cancer), or other problems like bleeding. During the procedure, the doctor can take samples of tissue. The samples can then be checked for cancer or other conditions. The doctor can also take out polyps. How is colonoscopy done? This procedure is done in a doctor's office or a clinic or hospital. You will get medicine to help you relax and not feel pain. Some people find that they do not remember having the test because of the medicine. The doctor gently moves the colonoscope, or scope, through the colon. The scope is also a small video camera. It lets the doctor see the colon and take pictures. A colonoscopy usually takes 30 to 45 minutes. It may take longer if the doctor has to remove polyps. How do you prepare for the procedure? You need to clean out your colon before the procedure so the doctor can see all of your colon.  You may start the cleaning process a day or two before the test. This depends on which \"colon prep\" your doctor recommends. To clean your colon, you stop eating solid foods and drink only clear liquids. You can have water, tea, coffee, clear juices, clear broths, flavored ice pops, and gelatin (such as Jell-O). Do not drink anything red or purple, such as grape juice or fruit punch. And do not eat red or purple foods, such as grape ice pops or cherry gelatin. The day or night before the procedure, you drink a large amount of a special liquid. This causes loose, frequent stools. You will go to the bathroom a lot. It is very important to drink all of the colon prep liquid. If you have problems drinking the liquid, call your doctor. For many people, the prep is worse than the test. It may be uncomfortable, and you may feel hungry on the clear liquid diet. Some people do not go to work or do their usual activities on the day of the prep. Arrange to have someone take you home after the test. 
What can you expect after a colonoscopy? The nurses will watch you for 1 to 2 hours until the medicines wear off. Then you can go home. You will need a ride. Your doctor will tell you when you can eat and do your usual activities. Your doctor will talk to you about when you will need your next colonoscopy. The results of your test and your risk for colorectal cancer will help your doctor decide how often you need to be checked. Follow-up care is a key part of your treatment and safety. Be sure to make and go to all appointments, and call your doctor if you are having problems. It's also a good idea to know your test results and keep a list of the medicines you take. Where can you learn more? Go to http://teddy-ac.info/. Enter Y548 in the search box to learn more about \"Learning About Colonoscopy. \" Current as of: May 12, 2017 Content Version: 11.4 © 9890-8473 Healthwise, [employer].  Care instructions adapted under license by [address] (which disclaims liability or warranty for this information). If you have questions about a medical condition or this instruction, always ask your healthcare professional. [address] any warranty or liability for your use of this information. DISCHARGE SUMMARY from Nurse PATIENT INSTRUCTIONS: 
 
 
F-face looks uneven A-arms unable to move or move unevenly S-speech slurred or non-existent T-time-call 911 as soon as signs and symptoms begin-DO NOT go Back to bed or wait to see if you get better-TIME IS BRAIN. Warning Signs of HEART ATTACK Call 911 if you have these symptoms: 
? Chest discomfort. Most heart attacks involve discomfort in the center of the chest that lasts more than a few minutes, or that goes away and comes back. It can feel like uncomfortable pressure, squeezing, fullness, or pain. ? Discomfort in other areas of the upper body. Symptoms can include pain or discomfort in one or both arms, the back, neck, jaw, or stomach. ? Shortness of breath with or without chest discomfort. ? Other signs may include breaking out in a cold sweat, nausea, or lightheadedness. Don't wait more than five minutes to call [address]! Fast action can save your life. Calling 911 is almost always the fastest way to get lifesaving treatment. Emergency Medical Services staff can begin treatment when they arrive  up to an hour sooner than if someone gets to the hospital by car. The discharge information has been reviewed with the patient. The patient verbalized understanding. Discharge medications reviewed with the patient and appropriate educational materials and side effects teaching were provided.  
_Patient armband removed and shredded___________________________________________________________________ _______________________________________________________________ Introducing [facility]! Dear Evelia Bound: Thank you for requesting a Inkblazers account. Our records indicate that you already have an active Inkblazers account. You can access your account anytime at [email or web address] Did you know that you can access your hospital and ER discharge instructions at any time in Inkblazers? You can also review all of your test results from your hospital stay or ER visit. Additional Information If you have questions, please visit the Frequently Asked Questions section of the Inkblazers website at [email or web address] Remember, Inkblazers is NOT to be used for urgent needs. For medical emergencies, dial 911. Now available from your iPhone and Android! Providers Seen During Your Hospitalization Provider Specialty Primary office phone Kosta Sanford MD Colon and Rectal Surgery [phone] Your Primary Care Physician (PCP) Primary Care Physician Office Phone Office Fax [facility] [phone] You are allergic to the following Allergen Reactions Lasix (Furosemide) Other (comments) Levofloxacin Rash Penicillins Swelling Recent Documentation Weight BMI Smoking Status 104.3 kg 32.08 kg/m2 Former Smoker Emergency Contacts Name Discharge Info Relation Home Work Mobile Brandin Chairez DISCHARGE CAREGIVER [3] Other Relative [6]   [phone] ScottmyahAlla  Other Relative [6] [phone] Patient Belongings The following personal items are in your possession at time of discharge: 
  Dental Appliances: Lowers, Uppers, With patient  Visual Aid: None Please provide this summary of care documentation to your next provider.  
  
  
 
  
Signatures-by signing, you are acknowledging that this After Visit Summary has been reviewed with you and you have received a copy. Patient Signature:  ____________________________________________________________ Date:  ____________________________________________________________  
  
[employer] Provider Signature:  ____________________________________________________________ Date:  ____________________________________________________________

## 2017-12-14 NOTE — ROUTINE PROCESS
Patient would not wait for discharge instructions or DR to visit as ride was waiting. Copy of discharge instructions given to patient.

## 2017-12-14 NOTE — PROCEDURES
Colonoscopy Procedure Note      Angel Luis Fernandez  1955  [id number]                Date of Procedure: 12/14/2017    Indications:    Family history of coloretal cancer (screening only)     Preoperative diagnosis: colon cancer screen      Postoperative diagnosis: diverticulosis, polyps    Title of Procedure:  Colonoscopy with polypectomies    :  Lexx Ramirez MD    Assistant(s): Endoscopy Technician-1: Elvi Linares  Endoscopy RN-1: Donita Carson RN    Referring Source:  [id number] [address] Lorri Weinberg MD    Sedation:  Demerol 50 mg IV,  Versed 3 mg IV      ASA Class: ASA 3 - Severe systemic disease but compensated        Procedure Details:    Prior to the procedure, a history and physical were performed. The patients medications, allergies and sensitivities were reviewed and all questions were answered. After informed consent was obtained for the procedure, with all risks and benefits of procedure explained. The patient was taken to the endoscopy suite and placed in the left lateral decubitus position. Patient identification and proposed procedure were verified prior to the procedure by the nurse and I. Following the  satisfactory administration of sedation,  the anus was inspected and appeared within normal limits. Digital rectal examination revealed Normal sphincter tone and squeeze pressure. Palpation revealed No Masses. Next the Olympus video colonoscope was introduced through the anus and advanced to cecum, which was identified by the ileocecal valve and appendiceal orifice, terminal ileum. The quality of preparation was good. The terminal ileum was visualized. The colonoscope was then slowly withdrawn and the mucosa carefully examined for any abnormalities. Cecal withdrawl time was greater than six minutes. The patient tolerated the procedure well. Findings:   Rectum: normal  Sigmoid: 1  Sessile polyp(s), the largest 3 mm in size  mild diverticulosis;   Descending Colon: mild diverticulosis;  Transverse Colon: 1  Sessile polyp(s), the largest 3 mm in size;  Ascending Colon: normal  Cecum: normal  Terminal Ileum: normal      Interventions:  2 complete polypectomy were performed using cold biopsy forceps and the polyps were  retrieved    Specimen Removed:   ID Type Source Tests Collected by Time Destination   1 : Bx polyp Preservative Colon, Transverse  Delmer Iyer MD 12/14/2017 1515 Pathology   2 : Bx polyp Preservative Sigmoid  Delmer Iyer MD 12/14/2017 1518 Pathology        Complications: None. EBL:  None. Impression:    diverticulosis, polyps     Recommendations: -Await pathology. Resume normal medication(s). Discharge Disposition:  Home in the company of a  when able to ambulate.         Delmer Iyer MD, FACS, FASCRS  Colon and Rectal Surgery  [facility]  Office [phone]  Fax     [phone]  12/14/2017  4:02 PM       [facility]  [address] [place] [address]

## 2018-01-10 ENCOUNTER — OFFICE VISIT (OUTPATIENT)
Dept: CARDIOLOGY CLINIC | Age: 63
End: 2018-01-10

## 2018-01-10 DIAGNOSIS — I42.9 CARDIOMYOPATHY, UNSPECIFIED TYPE (HCC): Primary | ICD-10-CM

## 2018-01-10 DIAGNOSIS — Z95.810 AICD (AUTOMATIC CARDIOVERTER/DEFIBRILLATOR) PRESENT: ICD-10-CM

## 2018-01-10 NOTE — PROGRESS NOTES
I have personally seen and evaluated the device findings. Interrogation reviewed and I agree with assessment.     Braxton Aguirre

## 2018-01-19 ENCOUNTER — OFFICE VISIT (OUTPATIENT)
Dept: FAMILY MEDICINE CLINIC | Age: 63
End: 2018-01-19

## 2018-01-19 VITALS
HEART RATE: 80 BPM | RESPIRATION RATE: 20 BRPM | SYSTOLIC BLOOD PRESSURE: 118 MMHG | WEIGHT: 232 LBS | HEIGHT: 71 IN | OXYGEN SATURATION: 95 % | DIASTOLIC BLOOD PRESSURE: 80 MMHG | TEMPERATURE: 97.6 F | BODY MASS INDEX: 32.48 KG/M2

## 2018-01-19 DIAGNOSIS — I73.9 PAD (PERIPHERAL ARTERY DISEASE) (HCC): ICD-10-CM

## 2018-01-19 DIAGNOSIS — Z79.4 TYPE 2 DIABETES MELLITUS WITH DIABETIC NEUROPATHY, WITH LONG-TERM CURRENT USE OF INSULIN (HCC): Primary | ICD-10-CM

## 2018-01-19 DIAGNOSIS — I25.10 CORONARY ARTERY DISEASE INVOLVING NATIVE CORONARY ARTERY OF NATIVE HEART WITHOUT ANGINA PECTORIS: ICD-10-CM

## 2018-01-19 DIAGNOSIS — E78.00 HYPERCHOLESTEREMIA: ICD-10-CM

## 2018-01-19 DIAGNOSIS — B35.1 ONYCHOMYCOSIS: ICD-10-CM

## 2018-01-19 DIAGNOSIS — E11.40 TYPE 2 DIABETES MELLITUS WITH DIABETIC NEUROPATHY, WITH LONG-TERM CURRENT USE OF INSULIN (HCC): Primary | ICD-10-CM

## 2018-01-19 DIAGNOSIS — K21.9 GASTROESOPHAGEAL REFLUX DISEASE WITHOUT ESOPHAGITIS: ICD-10-CM

## 2018-01-19 DIAGNOSIS — I10 ESSENTIAL HYPERTENSION: ICD-10-CM

## 2018-01-19 RX ORDER — TERBINAFINE HYDROCHLORIDE 250 MG/1
250 TABLET ORAL DAILY
Qty: 30 TAB | Refills: 2 | Status: SHIPPED | OUTPATIENT
Start: 2018-01-19 | End: 2018-10-05

## 2018-01-19 RX ORDER — PANTOPRAZOLE SODIUM 40 MG/1
40 TABLET, DELAYED RELEASE ORAL DAILY
Qty: 30 TAB | Refills: 1 | Status: SHIPPED | OUTPATIENT
Start: 2018-01-19 | End: 2018-03-01 | Stop reason: SDUPTHER

## 2018-01-19 NOTE — MR AVS SNAPSHOT
303 [facility] 
 
 
 [address] [facility] [address] [facility] 83 [address] [phone] Patient: Catie Li MRN: [id number] Visit Information Date & Time Provider Department Dept. Phone Encounter #  
 1/19/2018 10:15 AM Eb Stafford, [facility]1 [facility] [phone] [id number] Follow-up Instructions Return in about 3 months (around 4/19/2018). Your Appointments 2/8/2018 10:45 AM  
Follow Up with Luis Phelps MD  
Cardio Specialist at [facility] [place] [place] [facility]) Appt Note: 9 m f/u/per tickler [id number] [facility] [address] [facility] 83 [address]21 [address] [facility] 4/11/2018  1:20 PM  
CARELINK with Roldan Holman [facility] ([facility]) Appt Note: 9 month carelink [place] [address] [address] [address]  
[phone] [facility] [place] [address] Upcoming Health Maintenance Date Due  
 EYE EXAM RETINAL OR DILATED Q1 1/29/1965 FOOT EXAM Q1 4/4/2018 MICROALBUMIN Q1 4/4/2018 MEDICARE YEARLY EXAM 4/5/2018 HEMOGLOBIN A1C Q6M 4/26/2018 LIPID PANEL Q1 10/26/2018 COLONOSCOPY 12/14/2022 DTaP/Tdap/Td series (2 - Td) 4/4/2027 Allergies as of 1/19/2018  Review Complete On: 12/14/2017 By: Lonny Gold RN Severity Noted Reaction Type Reactions Lasix [Furosemide]  05/16/2016    Other (comments) Levofloxacin  05/25/2016    Rash Penicillins  11/28/2015    Swelling Current Immunizations  Reviewed on 10/20/2016 Name Date Influenza Vaccine (Quad) PF 10/26/2017, 10/20/2016 Pneumococcal Vaccine (Unspecified Type) 1/1/2013 Not reviewed this visit You Were Diagnosed With   
  
 Codes Comments  Type 2 diabetes mellitus with diabetic neuropathy, with long-term current use of insulin (HCC)    -  Primary ICD-10-CM: E11.40, Z79.4 ICD-9-CM: 250.60, 357.2, V58.67 Onychomycosis     ICD-10-CM: B35.1 ICD-9-CM: 110.1 PAD (peripheral artery disease) (HCC)     ICD-10-CM: I73.9 ICD-9-CM: 443.9 Coronary artery disease involving native coronary artery of native heart without angina pectoris     ICD-10-CM: I25.10 ICD-9-CM: 414.01 Essential hypertension     ICD-10-CM: I10 
ICD-9-CM: 401.9 Hypercholesteremia     ICD-10-CM: E78.00 ICD-9-CM: 272.0 Gastroesophageal reflux disease without esophagitis     ICD-10-CM: K21.9 ICD-9-CM: 530.81 Vitals BP Pulse Temp Resp Height(growth percentile) Weight(growth percentile) 118/80 (BP 1 Location: Right arm, BP Patient Position: At rest) 80 97.6 °F (36.4 °C) (Oral) 20 5' 11\" (1.803 m) 232 lb (105.2 kg) SpO2 BMI Smoking Status 95% 32.36 kg/m2 Former Smoker Vitals History BMI and BSA Data Body Mass Index Body Surface Area  
 32.36 kg/m 2 2.3 m 2 Preferred Pharmacy Pharmacy Name Phone [facility] - [address], [address] [phone] Your Updated Medication List  
  
   
This list is accurate as of: 1/19/18 10:58 AM.  Always use your most recent med list.  
  
  
  
  
 acetaminophen 500 mg tablet Commonly known as:  [address] Take 2 Tabs by mouth every six (6) hours as needed for Pain. albuterol 90 mcg/actuation inhaler Commonly known as:  PROVENTIL HFA, VENTOLIN HFA, PROAIR HFA Take 2 Puffs by inhalation every six (6) hours as needed for Wheezing. aspirin 81 mg chewable tablet Take 1 Tab by mouth daily. Indications: MYOCARDIAL INFARCTION PREVENTION  
  
 atorvastatin 80 mg tablet Commonly known as:  LIPITOR Take 1 Tab by mouth nightly. budesonide-formoterol 160-4.5 mcg/actuation Hfaa Commonly known as:  SYMBICORT Take 2 Puffs by inhalation two (2) times a day. carvedilol 25 mg tablet Commonly known as:  Clerance Barley Take 12.5 mg by mouth two (2) times daily (with meals). 1/2 tab BID Cholecalciferol (Vitamin D3) 50,000 unit Cap Take  by mouth. hydrALAZINE 25 mg tablet Commonly known as:  APRESOLINE Take 1 Tab by mouth three (3) times daily. hydroCHLOROthiazide 25 mg tablet Commonly known as:  HYDRODIURIL Take 1 Tab by mouth daily. LANTUS 100 unit/mL injection Generic drug:  insulin glargine 35 Units by SubCUTAneous route nightly. pt takes 30 Units  
  
 losartan 100 mg tablet Commonly known as:  COZAAR Take 1 Tab by mouth daily. mometasone 50 mcg/actuation nasal spray Commonly known as:  NASONEX  
2 Sprays by Both Nostrils route daily. mupirocin 2 % ointment Commonly known as:  [employer] Apply  to affected area three (3) times daily. Apply to area for 10 days NIFEdipine ER 60 mg ER tablet Commonly known as:  ADALAT CC Take 1 Tab by mouth daily. pantoprazole 40 mg tablet Commonly known as:  PROTONIX Take 1 Tab by mouth daily. terbinafine HCl 250 mg tablet Commonly known as:  LAMISIL Take 1 Tab by mouth daily. Prescriptions Sent to Pharmacy Refills  
 pantoprazole (PROTONIX) 40 mg tablet 1 Sig: Take 1 Tab by mouth daily. Class: Normal  
 Pharmacy: [address] [place] [address], [employer] Ph #: [phone] Route: Oral  
 terbinafine HCl (LAMISIL) 250 mg tablet 2 Sig: Take 1 Tab by mouth daily. Class: Normal  
 Pharmacy: [address] [place] [address], [employer] Ph #: [phone] Route: Oral  
  
Follow-up Instructions Return in about 3 months (around 4/19/2018). Introducing hospitals & HEALTH SERVICES! Dear Clinton Perez: Thank you for requesting a [employer] account. Our records indicate that you already have an active [employer] account. You can access your account anytime at [email or web address] Did you know that you can access your hospital and ER discharge instructions at any time in Array Storm? You can also review all of your test results from your hospital stay or ER visit. Additional Information If you have questions, please visit the Frequently Asked Questions section of the [employer] website at [email or web address] Remember, Array Storm is NOT to be used for urgent needs. For medical emergencies, dial 911. Now available from your iPhone and Android! Please provide this summary of care documentation to your next provider. Your primary care clinician is listed as [id number] [address]. If you have any questions after today's visit, please call [phone].

## 2018-01-20 NOTE — PROGRESS NOTES
Nicolle Roland is a 60 y.o.  male and presents with     Chief Complaint   Patient presents with    Diabetes    Hypertension    Cholesterol Problem    Nail Problem       Pt feels fine today. He reports that his blood sugars are doing good. ,  He thinks the lamisil is heling him as the new nail of his fingers look good. He does want to continue the meds for 2 more months. Past Medical History:   Diagnosis Date    Biventricular ICD (implantable cardioverter-defibrillator) in place 07/16    [employer]    CAD (coronary artery disease)     Cardiomyopathy, ischemic     EF 30% (04/16) 30-35% (11/15)    [facility]     DVT (deep venous thrombosis) ([facility] 08/16    L axillary vein after PPM insertion    HLD (hyperlipidemia)     Hypertension     NSTEMI (non-ST elevated myocardial infarction) ([facility]     S/P OM1 2.5 X 23 mm XIENCE (11/15)    Pulmonary emphysema ([facility]     Stroke [facility])      Past Surgical History:   Procedure Laterality Date    COLONOSCOPY N/A 12/14/2017    COLONOSCOPY performed by Margarito David MD at [address] [place] [address] HX PACEMAKER PLACEMENT      VASCULAR SURGERY PROCEDURE UNLIST      Stent placed right thigh     Current Outpatient Prescriptions   Medication Sig    pantoprazole (PROTONIX) 40 mg tablet Take 1 Tab by mouth daily.  terbinafine HCl (LAMISIL) 250 mg tablet Take 1 Tab by mouth daily.  Cholecalciferol, Vitamin D3, 50,000 unit cap Take  by mouth.  mupirocin (BACTROBAN) 2 % ointment Apply  to affected area three (3) times daily. Apply to area for 10 days    albuterol (PROVENTIL HFA, VENTOLIN HFA, PROAIR HFA) 90 mcg/actuation inhaler Take 2 Puffs by inhalation every six (6) hours as needed for Wheezing.  budesonide-formoterol (SYMBICORT) 160-4.5 mcg/actuation HFA inhaler Take 2 Puffs by inhalation two (2) times a day.  acetaminophen (TYLENOL EXTRA STRENGTH) 500 mg tablet Take 2 Tabs by mouth every six (6) hours as needed for Pain.     mometasone (NASONEX) 50 mcg/actuation nasal spray 2 Sprays by Both Nostrils route daily.  carvedilol (COREG) 25 mg tablet Take 12.5 mg by mouth two (2) times daily (with meals). 1/2 tab BID    NIFEdipine ER (ADALAT CC) 60 mg ER tablet Take 1 Tab by mouth daily.  losartan (COZAAR) 100 mg tablet Take 1 Tab by mouth daily.  hydrochlorothiazide (HYDRODIURIL) 25 mg tablet Take 1 Tab by mouth daily.  aspirin 81 mg chewable tablet Take 1 Tab by mouth daily. Indications: MYOCARDIAL INFARCTION PREVENTION    atorvastatin (LIPITOR) 80 mg tablet Take 1 Tab by mouth nightly.  hydrALAZINE (APRESOLINE) 25 mg tablet Take 1 Tab by mouth three (3) times daily.  insulin glargine (LANTUS) 100 unit/mL injection 35 Units by SubCUTAneous route nightly. pt takes 30 Units      No current facility-administered medications for this visit. Health Maintenance   Topic Date Due    EYE EXAM RETINAL OR DILATED Q1  01/29/1965    FOOT EXAM Q1  04/04/2018    MICROALBUMIN Q1  04/04/2018    MEDICARE YEARLY EXAM  04/05/2018    HEMOGLOBIN A1C Q6M  04/26/2018    LIPID PANEL Q1  10/26/2018    COLONOSCOPY  12/14/2022    DTaP/Tdap/Td series (2 - Td) 04/04/2027    Hepatitis C Screening  Completed    ZOSTER VACCINE AGE 60>  Completed    Pneumococcal 19-64 Medium Risk  Completed    Influenza Age 5 to Adult  Completed     Immunization History   Administered Date(s) Administered    Influenza Vaccine (Quad) PF 10/20/2016, 10/26/2017    Pneumococcal Vaccine (Unspecified Type) 01/01/2013     No LMP for male patient. Allergies and Intolerances: Allergies   Allergen Reactions    Lasix [Furosemide] Other (comments)    Levofloxacin Rash    Penicillins Swelling       Family History:   Family History   Problem Relation Age of Onset    Heart Disease Mother     Heart Disease Father        Social History:   He  reports that he quit smoking about 2 years ago. His smoking use included Cigarettes. He has a 45.00 pack-year smoking history.  He has never used smokeless tobacco.  He  reports that he does not drink alcohol. Review of Systems:   General: negative for - chills, fatigue, fever, weight change  Psych: negative for - anxiety, depression, irritability or mood swings  ENT: negative for - headaches, hearing change, nasal congestion, oral lesions, sneezing or sore throat  Heme/ Lymph: negative for - bleeding problems, bruising, pallor or swollen lymph nodes  Endo: negative for - hot flashes, polydipsia/polyuria or temperature intolerance  Resp: negative for - cough, shortness of breath or wheezing  CV: negative for - chest pain, edema or palpitations  GI: negative for - abdominal pain, change in bowel habits, constipation, diarrhea or nausea/vomiting  : negative for - dysuria, hematuria, incontinence, pelvic pain or vulvar/vaginal symptoms  MSK: negative for - joint pain, joint swelling or muscle pain  Neuro: negative for - confusion, headaches, seizures or weakness  Derm: negative for - dry skin, hair changes, rash or skin lesion changes,tinea unguium          Physical:   Vitals:   Vitals:    01/19/18 1029   BP: 118/80   Pulse: 80   Resp: 20   Temp: 97.6 °F (36.4 °C)   TempSrc: Oral   SpO2: 95%   Weight: 232 lb (105.2 kg)   Height: 5' 11\" (1.803 m)           Exam:   HEENT- atraumatic,normocephalic, awake, oriented, well nourished  Neck - supple,no enlarged lymph nodes, no JVD, no thyromegaly  Chest- CTA, no rhonchi, no crackles  Heart- rrr, no murmurs / gallop/rub  Abdomen- soft,BS+,NT, no hepatosplenomegaly  Ext - no c/c/edema   Neuro- no focal deficits. Power 5/5 all extremities  Skin - warm,dry, no obvious rashes, tinea unguim of the fingers          Review of Data:   LABS:   Lab Results   Component Value Date/Time    WBC 6.4 10/26/2017 11:35 AM    HGB 14.3 10/26/2017 11:35 AM    HCT 44.2 10/26/2017 11:35 AM    PLATELET 883 29/58/8316 11:35 AM     Lab Results   Component Value Date/Time    Sodium 143 10/26/2017 11:35 AM    Potassium 3.8 10/26/2017 11:35 AM    Chloride 102 10/26/2017 11:35 AM    CO2 25 10/26/2017 11:35 AM    Glucose 106 10/26/2017 11:35 AM    BUN 22 10/26/2017 11:35 AM    Creatinine 1.18 10/26/2017 11:35 AM     Lab Results   Component Value Date/Time    Cholesterol, total 170 10/26/2017 11:35 AM    HDL Cholesterol 57 10/26/2017 11:35 AM    LDL, calculated 89 10/26/2017 11:35 AM    Triglyceride 120 10/26/2017 11:35 AM     No results found for: GPT        Impression / Plan:        ICD-10-CM ICD-9-CM    1. Type 2 diabetes mellitus with diabetic neuropathy, with long-term current use of insulin ([facility]) E11.40 250.60     Z79.4 357.2      V58.67    2. Onychomycosis B35.1 110.1 terbinafine HCl (LAMISIL) 250 mg tablet   3. PAD (peripheral artery disease) ([facility]) I73.9 443.9    4. Coronary artery disease involving native coronary artery of native heart without angina pectoris I25.10 414.01    5. Essential hypertension I10 401.9    6. Hypercholesteremia E78.00 272.0    7. Gastroesophageal reflux disease without esophagitis K21.9 530.81 pantoprazole (PROTONIX) 40 mg tablet     Will stop terbinfaine after 2 months. Explained to patient risk benefits of the medications. Advised patient to stop meds if having any side effects. Pt verbalized understanding of the instructions. I have discussed the diagnosis with the patient and the intended plan as seen in the above orders. The patient has received an after-visit summary and questions were answered concerning future plans. I have discussed medication side effects and warnings with the patient as well. I have reviewed the plan of care with the patient, accepted their input and they are in agreement with the treatment goals. Reviewed plan of care. Patient has provided input and agrees with goals. Follow-up Disposition:  Return in about 3 months (around 4/19/2018).     Soni Knight MD

## 2018-03-01 ENCOUNTER — OFFICE VISIT (OUTPATIENT)
Dept: FAMILY MEDICINE CLINIC | Age: 63
End: 2018-03-01

## 2018-03-01 VITALS
DIASTOLIC BLOOD PRESSURE: 73 MMHG | SYSTOLIC BLOOD PRESSURE: 124 MMHG | HEART RATE: 70 BPM | TEMPERATURE: 97.9 F | WEIGHT: 234 LBS | HEIGHT: 71 IN | OXYGEN SATURATION: 96 % | BODY MASS INDEX: 32.76 KG/M2 | RESPIRATION RATE: 18 BRPM

## 2018-03-01 DIAGNOSIS — E11.40 TYPE 2 DIABETES MELLITUS WITH DIABETIC NEUROPATHY, WITH LONG-TERM CURRENT USE OF INSULIN (HCC): ICD-10-CM

## 2018-03-01 DIAGNOSIS — J30.1 CHRONIC SEASONAL ALLERGIC RHINITIS DUE TO POLLEN: ICD-10-CM

## 2018-03-01 DIAGNOSIS — M25.473 ANKLE SWELLING, UNSPECIFIED LATERALITY: Primary | ICD-10-CM

## 2018-03-01 DIAGNOSIS — J44.9 CHRONIC OBSTRUCTIVE PULMONARY DISEASE, UNSPECIFIED COPD TYPE (HCC): ICD-10-CM

## 2018-03-01 DIAGNOSIS — I10 ESSENTIAL HYPERTENSION: ICD-10-CM

## 2018-03-01 DIAGNOSIS — K21.9 GASTROESOPHAGEAL REFLUX DISEASE WITHOUT ESOPHAGITIS: ICD-10-CM

## 2018-03-01 DIAGNOSIS — Z79.4 TYPE 2 DIABETES MELLITUS WITH DIABETIC NEUROPATHY, WITH LONG-TERM CURRENT USE OF INSULIN (HCC): ICD-10-CM

## 2018-03-01 RX ORDER — ATORVASTATIN CALCIUM 80 MG/1
80 TABLET, FILM COATED ORAL
Qty: 30 TAB | Refills: 5 | Status: SHIPPED | OUTPATIENT
Start: 2018-03-01 | End: 2018-07-05 | Stop reason: SDUPTHER

## 2018-03-01 RX ORDER — PANTOPRAZOLE SODIUM 40 MG/1
40 TABLET, DELAYED RELEASE ORAL DAILY
Qty: 30 TAB | Refills: 1 | Status: SHIPPED | OUTPATIENT
Start: 2018-03-01 | End: 2018-07-05 | Stop reason: SDUPTHER

## 2018-03-01 RX ORDER — HYDRALAZINE HYDROCHLORIDE 25 MG/1
25 TABLET, FILM COATED ORAL 3 TIMES DAILY
Qty: 100 TAB | Refills: 5 | Status: SHIPPED | OUTPATIENT
Start: 2018-03-01 | End: 2018-03-16

## 2018-03-01 RX ORDER — FLUNISOLIDE 0.25 MG/ML
SOLUTION NASAL
Status: CANCELLED | OUTPATIENT
Start: 2018-03-01

## 2018-03-01 RX ORDER — NIFEDIPINE 60 MG/1
60 TABLET, EXTENDED RELEASE ORAL DAILY
Qty: 90 TAB | Refills: 3 | Status: SHIPPED | OUTPATIENT
Start: 2018-03-01 | End: 2018-07-05 | Stop reason: SDUPTHER

## 2018-03-01 RX ORDER — LOSARTAN POTASSIUM 100 MG/1
100 TABLET ORAL DAILY
Qty: 90 TAB | Refills: 3 | Status: SHIPPED | OUTPATIENT
Start: 2018-03-01 | End: 2018-07-05 | Stop reason: SDUPTHER

## 2018-03-01 RX ORDER — FLUTICASONE PROPIONATE 50 MCG
SPRAY, SUSPENSION (ML) NASAL
Qty: 16 G | Refills: 0 | OUTPATIENT
Start: 2018-03-01

## 2018-03-01 RX ORDER — BUDESONIDE AND FORMOTEROL FUMARATE DIHYDRATE 160; 4.5 UG/1; UG/1
2 AEROSOL RESPIRATORY (INHALATION) 2 TIMES DAILY
Qty: 1 INHALER | Refills: 3 | Status: SHIPPED | OUTPATIENT
Start: 2018-03-01 | End: 2020-03-18

## 2018-03-01 RX ORDER — BUMETANIDE 1 MG/1
1 TABLET ORAL DAILY
Qty: 10 TAB | Refills: 0 | Status: SHIPPED | OUTPATIENT
Start: 2018-03-01 | End: 2018-04-10

## 2018-03-01 RX ORDER — CARVEDILOL 25 MG/1
12.5 TABLET ORAL 2 TIMES DAILY WITH MEALS
Qty: 30 TAB | Refills: 3 | Status: SHIPPED | OUTPATIENT
Start: 2018-03-01 | End: 2018-07-05 | Stop reason: SDUPTHER

## 2018-03-01 RX ORDER — HYDROCHLOROTHIAZIDE 25 MG/1
25 TABLET ORAL DAILY
Qty: 90 TAB | Refills: 3 | Status: SHIPPED | OUTPATIENT
Start: 2018-03-01 | End: 2018-07-05 | Stop reason: SDUPTHER

## 2018-03-01 NOTE — PROGRESS NOTES
1. Have you been to the ER, urgent care clinic since your last visit? Hospitalized since your last visit? No    2. Have you seen or consulted any other health care providers outside of the [address] [place] [address] since your last visit? Include any pap smears or colon screening.  No

## 2018-03-01 NOTE — MR AVS SNAPSHOT
[address] [facility] 83 [address] 
[phone] Patient: Keesha Martin MRN: [id number]:[id number] Visit Information Date & Time Provider Department Dept. Phone Encounter #  
 3/1/2018  9:45 AM Kevin Matute, [facility] [phone] [id number] Follow-up Instructions Return in about 2 months (around 5/1/2018). Your Appointments 3/16/2018 10:45 AM  
Follow Up with Luis Licona MD  
Cardio Specialist at [facility] [facility]) Appt Note: 9 m f/u/per tickler; $45. copay/$0 past bal; 02.06.18 appt confirmed//JBJ; 9 mo f/u with Sudarshan/appt requested in the month of March-S40 copay//JBJ; r/s provider out of office [facility] 400 [facility] 83 [address]25 [address] [place] 1334 4/11/2018  1:20 PM  
CARELINK with Pacer Hv [facility] [place] ([facility]) Appt Note: [facility] [address] [address] [address] [phone] [address] [place] [address] [address] Upcoming Health Maintenance Date Due  
 EYE EXAM RETINAL OR DILATED Q1 1/29/1965 FOOT EXAM Q1 4/4/2018 MICROALBUMIN Q1 4/4/2018 MEDICARE YEARLY EXAM 4/5/2018 HEMOGLOBIN A1C Q6M 4/26/2018 LIPID PANEL Q1 10/26/2018 COLONOSCOPY 12/14/2022 DTaP/Tdap/Td series (2 - Td) 4/4/2027 Allergies as of 3/1/2018  Review Complete On: 3/1/2018 By: Sherley Cole LPN Severity Noted Reaction Type Reactions Lasix [Furosemide]  05/16/2016    Other (comments) Levofloxacin  05/25/2016    Rash Penicillins  11/28/2015    Swelling Current Immunizations  Reviewed on 10/20/2016 Name Date Influenza Vaccine (Quad) PF 10/26/2017, 10/20/2016 Pneumococcal Vaccine (Unspecified Type) 1/1/2013 Not reviewed this visit You Were Diagnosed With   
  
 Codes Comments Ankle swelling, unspecified laterality    -  Primary ICD-10-CM: M25.473 ICD-9-CM: 719.07 Chronic seasonal allergic rhinitis due to pollen     ICD-10-CM: J30.1 ICD-9-CM: 477.0 Chronic obstructive pulmonary disease, unspecified COPD type ([facility]     ICD-10-CM: J44.9 ICD-9-CM: 014 Gastroesophageal reflux disease without esophagitis     ICD-10-CM: K21.9 ICD-9-CM: 530.81 Type 2 diabetes mellitus with diabetic neuropathy, with long-term current use of insulin ([facility])     ICD-10-CM: E11.40, Z79.4 ICD-9-CM: 250.60, 357.2, V58.67 Essential hypertension     ICD-10-CM: I10 
ICD-9-CM: 401.9 Vitals BP Pulse Temp Resp Height(growth percentile) Weight(growth percentile) 124/73 70 97.9 °F (36.6 °C) (Oral) 18 5' 11\" (1.803 m) 234 lb (106.1 kg) SpO2 BMI Smoking Status 96% 32.64 kg/m2 Former Smoker Vitals History BMI and BSA Data Body Mass Index Body Surface Area  
 32.64 kg/m 2 2.31 m 2 Preferred Pharmacy Pharmacy Name Phone [facility] - [address], [address] [phone] Your Updated Medication List  
  
   
This list is accurate as of 3/1/18 10:50 AM.  Always use your most recent med list.  
  
  
  
  
 acetaminophen 500 mg tablet Commonly known as:  [address] Se Take 2 Tabs by mouth every six (6) hours as needed for Pain. albuterol 90 mcg/actuation inhaler Commonly known as:  PROVENTIL HFA, VENTOLIN HFA, PROAIR HFA Take 2 Puffs by inhalation every six (6) hours as needed for Wheezing. aspirin 81 mg chewable tablet Take 1 Tab by mouth daily. Indications: MYOCARDIAL INFARCTION PREVENTION  
  
 atorvastatin 80 mg tablet Commonly known as:  LIPITOR Take 1 Tab by mouth nightly. budesonide-formoterol 160-4.5 mcg/actuation Hfaa Commonly known as:  SYMBICORT Take 2 Puffs by inhalation two (2) times a day. carvedilol 25 mg tablet Commonly known as:  Russ Marie Take 0.5 Tabs by mouth two (2) times daily (with meals). 1/2 tab BID Cholecalciferol (Vitamin D3) 50,000 unit Cap Take  by mouth. hydrALAZINE 25 mg tablet Commonly known as:  APRESOLINE Take 1 Tab by mouth three (3) times daily. hydroCHLOROthiazide 25 mg tablet Commonly known as:  HYDRODIURIL Take 1 Tab by mouth daily. LANTUS U-100 INSULIN 100 unit/mL injection Generic drug:  insulin glargine 35 Units by SubCUTAneous route nightly. pt takes 30 Units  
  
 losartan 100 mg tablet Commonly known as:  COZAAR Take 1 Tab by mouth daily. mometasone 50 mcg/actuation nasal spray Commonly known as:  NASONEX  
2 Sprays by Both Nostrils route daily. mupirocin 2 % ointment Commonly known as:  [employer] Apply  to affected area three (3) times daily. Apply to area for 10 days NIFEdipine ER 60 mg ER tablet Commonly known as:  ADALAT CC Take 1 Tab by mouth daily. pantoprazole 40 mg tablet Commonly known as:  PROTONIX Take 1 Tab by mouth daily. terbinafine HCl 250 mg tablet Commonly known as:  LAMISIL Take 1 Tab by mouth daily. Prescriptions Sent to Pharmacy Refills  
 budesonide-formoterol (SYMBICORT) 160-4.5 mcg/actuation HFAA 3 Sig: Take 2 Puffs by inhalation two (2) times a day. Class: Normal  
 Pharmacy: [address] [place] [address], [employer] [address] Ph #: [phone] Route: Inhalation  
 pantoprazole (PROTONIX) 40 mg tablet 1 Sig: Take 1 Tab by mouth daily. Class: Normal  
 Pharmacy: [address] [place] [address] [facility] [address] [employer] Ph #: [phone] Route: Oral  
 losartan (COZAAR) 100 mg tablet 3 Sig: Take 1 Tab by mouth daily. Class: Normal  
 Pharmacy: [address] [place] [address], [address] [employer] Ph #: [phone] Route: Oral  
 hydroCHLOROthiazide (HYDRODIURIL) 25 mg tablet 3 Sig: Take 1 Tab by mouth daily.   
 Class: Normal  
 Pharmacy: [facility]ce [facility] [place], [employer] Ph #: [phone] Route: Oral  
 atorvastatin (LIPITOR) 80 mg tablet 5 Sig: Take 1 Tab by mouth nightly. Class: Normal  
 Pharmacy: [facility] [address] [place], [employer] Ph #: [phone] Route: Oral  
 hydrALAZINE (APRESOLINE) 25 mg tablet 5 Sig: Take 1 Tab by mouth three (3) times daily. Class: Normal  
 Pharmacy: Kayce [facility], [employer] Ph #: [phone] Route: Oral  
 carvedilol (COREG) 25 mg tablet 3 Sig: Take 0.5 Tabs by mouth two (2) times daily (with meals). 1/2 tab BID Class: Normal  
 [facility] [employer] Ph #: [phone] Route: Oral  
 NIFEdipine ER (ADALAT CC) 60 mg ER tablet 3 Sig: Take 1 Tab by mouth daily. Class: Normal  
 Pharmacy: Kayce [facility], [employer] Ph #: [phone] Route: Oral  
  
Follow-up Instructions Return in about 2 months (around 5/1/2018). Introducing [facility] & HEALTH SERVICES! Dear Rosenda Blair: Thank you for requesting a [employer] account. Our records indicate that you already have an active [employer] account. You can access your account anytime at [email or web address] Did you know that you can access your hospital and ER discharge instructions at any time in [employer]ik? You can also review all of your test results from your hospital stay or ER visit. Additional Information If you have questions, please visit the Frequently Asked Questions section of the [employer] website at [email or web address] Remember, [employer]elerik is NOT to be used for urgent needs. For medical emergencies, dial 911. Now available from your iPhone and Android! Please provide this summary of care documentation to your next provider. Your primary care clinician is listed as Yovani Washington.  If you have any questions after today's visit, please call [phone].

## 2018-03-01 NOTE — PROGRESS NOTES
Renae Loera is a 58 y.o.  male and presents with     Chief Complaint   Patient presents with    Diabetes    Hypertension    Ankle swelling     Pt made appt today as he noticed swelling of his ankles. This happended after he bought a new pair of shoes on his own that are apparently too tight for him. Pt SOB, chest pain  Pt does have appt with Podiatry Dr Shilpa Valderrama in 3 days  Pt takes Nifedipine in the  Morning. Past Medical History:   Diagnosis Date    Biventricular ICD (implantable cardioverter-defibrillator) in place 07/16    Medtronic    CAD (coronary artery disease)     Cardiomyopathy, ischemic     EF 30% (04/16) 30-35% (11/15)    [facility]     DVT (deep venous thrombosis) ([facility] 08/16    L axillary vein after PPM insertion    HLD (hyperlipidemia)     Hypertension     NSTEMI (non-ST elevated myocardial infarction) ([facility]     S/P OM1 2.5 X 23 mm XIENCE (11/15)    Pulmonary emphysema ([facility]     Stroke [facility])      Past Surgical History:   Procedure Laterality Date    COLONOSCOPY N/A 12/14/2017    COLONOSCOPY performed by Sandra De La Fuente MD at [address] [place] [address] HX PACEMAKER PLACEMENT      VASCULAR SURGERY PROCEDURE UNLIST      Stent placed right thigh     Current Outpatient Prescriptions   Medication Sig    budesonide-formoterol (SYMBICORT) 160-4.5 mcg/actuation HFAA Take 2 Puffs by inhalation two (2) times a day.  pantoprazole (PROTONIX) 40 mg tablet Take 1 Tab by mouth daily.  losartan (COZAAR) 100 mg tablet Take 1 Tab by mouth daily.  hydroCHLOROthiazide (HYDRODIURIL) 25 mg tablet Take 1 Tab by mouth daily.  atorvastatin (LIPITOR) 80 mg tablet Take 1 Tab by mouth nightly.  hydrALAZINE (APRESOLINE) 25 mg tablet Take 1 Tab by mouth three (3) times daily.  carvedilol (COREG) 25 mg tablet Take 0.5 Tabs by mouth two (2) times daily (with meals). 1/2 tab BID    NIFEdipine ER (ADALAT CC) 60 mg ER tablet Take 1 Tab by mouth daily.     terbinafine HCl (LAMISIL) 250 mg tablet Take 1 Tab by mouth daily.  Cholecalciferol, Vitamin D3, 50,000 unit cap Take  by mouth.  mupirocin (BACTROBAN) 2 % ointment Apply  to affected area three (3) times daily. Apply to area for 10 days    mometasone (NASONEX) 50 mcg/actuation nasal spray 2 Sprays by Both Nostrils route daily.  aspirin 81 mg chewable tablet Take 1 Tab by mouth daily. Indications: MYOCARDIAL INFARCTION PREVENTION    insulin glargine (LANTUS) 100 unit/mL injection 35 Units by SubCUTAneous route nightly. pt takes 30 Units     albuterol (PROVENTIL HFA, VENTOLIN HFA, PROAIR HFA) 90 mcg/actuation inhaler Take 2 Puffs by inhalation every six (6) hours as needed for Wheezing.  acetaminophen (TYLENOL EXTRA STRENGTH) 500 mg tablet Take 2 Tabs by mouth every six (6) hours as needed for Pain. No current facility-administered medications for this visit. Health Maintenance   Topic Date Due    EYE EXAM RETINAL OR DILATED Q1  01/29/1965    FOOT EXAM Q1  04/04/2018    MICROALBUMIN Q1  04/04/2018    MEDICARE YEARLY EXAM  04/05/2018    HEMOGLOBIN A1C Q6M  04/26/2018    LIPID PANEL Q1  10/26/2018    COLONOSCOPY  12/14/2022    DTaP/Tdap/Td series (2 - Td) 04/04/2027    Hepatitis C Screening  Completed    ZOSTER VACCINE AGE 60>  Completed    Pneumococcal 19-64 Medium Risk  Completed    Influenza Age 5 to Adult  Completed     Immunization History   Administered Date(s) Administered    Influenza Vaccine (Quad) PF 10/20/2016, 10/26/2017    Pneumococcal Vaccine (Unspecified Type) 01/01/2013     No LMP for male patient. Allergies and Intolerances: Allergies   Allergen Reactions    Lasix [Furosemide] Other (comments)    Levofloxacin Rash    Penicillins Swelling       Family History:   Family History   Problem Relation Age of Onset    Heart Disease Mother     Heart Disease Father        Social History:   He  reports that he quit smoking about 2 years ago. His smoking use included Cigarettes.  He has a 45.00 pack-year smoking history. He has never used smokeless tobacco.  He  reports that he does not drink alcohol. Review of Systems:   General: negative for - chills, fatigue, fever, weight change  Psych: negative for - anxiety, depression, irritability or mood swings  ENT: negative for - headaches, hearing change, nasal congestion, oral lesions, sneezing or sore throat  Heme/ Lymph: negative for - bleeding problems, bruising, pallor or swollen lymph nodes  Endo: negative for - hot flashes, polydipsia/polyuria or temperature intolerance  Resp: negative for - cough, shortness of breath or wheezing  CV: negative for - chest pain, edema or palpitations  GI: negative for - abdominal pain, change in bowel habits, constipation, diarrhea or nausea/vomiting  : negative for - dysuria, hematuria, incontinence, pelvic pain or vulvar/vaginal symptoms  MSK: negative for - joint pain, joint swelling or muscle pain  Neuro: negative for - confusion, headaches, seizures or weakness  Derm: negative for - dry skin, hair changes, rash or skin lesion changes          Physical:   Vitals:   Vitals:    03/01/18 0947   BP: 124/73   Pulse: 70   Resp: 18   Temp: 97.9 °F (36.6 °C)   TempSrc: Oral   SpO2: 96%   Weight: 234 lb (106.1 kg)   Height: 5' 11\" (1.803 m)           Exam:   HEENT- atraumatic,normocephalic, awake, oriented, well nourished  Neck - supple,no enlarged lymph nodes, no JVD, no thyromegaly  Chest- CTA, no rhonchi, faint crackles rt base  Heart- rrr, no murmurs / gallop/rub  Abdomen- soft,BS+,NT, no hepatosplenomegaly  Ext - no c/c/edema , bialetral ankle swelling  Neuro- no focal deficits. Power 5/5 all extremities  Skin - warm,dry, no obvious rashes.           Review of Data:   LABS:   Lab Results   Component Value Date/Time    WBC 6.4 10/26/2017 11:35 AM    HGB 14.3 10/26/2017 11:35 AM    HCT 44.2 10/26/2017 11:35 AM    PLATELET 679 22/06/2611 11:35 AM     Lab Results   Component Value Date/Time    Sodium 143 10/26/2017 11:35 AM    Potassium 3.8 10/26/2017 11:35 AM    Chloride 102 10/26/2017 11:35 AM    CO2 25 10/26/2017 11:35 AM    Glucose 106 (H) 10/26/2017 11:35 AM    BUN 22 10/26/2017 11:35 AM    Creatinine 1.18 10/26/2017 11:35 AM     Lab Results   Component Value Date/Time    Cholesterol, total 170 10/26/2017 11:35 AM    HDL Cholesterol 57 10/26/2017 11:35 AM    LDL, calculated 89 10/26/2017 11:35 AM    Triglyceride 120 10/26/2017 11:35 AM     No results found for: GPT        Impression / Plan:        ICD-10-CM ICD-9-CM    1. Ankle swelling, unspecified laterality M25.473 719.07    2. Chronic seasonal allergic rhinitis due to pollen J30.1 477.0    3. Chronic obstructive pulmonary disease, unspecified COPD type ([facility]) J44.9 496 budesonide-formoterol (SYMBICORT) 160-4.5 mcg/actuation HFAA   4. Gastroesophageal reflux disease without esophagitis K21.9 530.81 pantoprazole (PROTONIX) 40 mg tablet   5. Type 2 diabetes mellitus with diabetic neuropathy, with long-term current use of insulin ([facility]) E11.40 250.60     Z79.4 357.2      V58.67    6. Essential hypertension I10 401.9 losartan (COZAAR) 100 mg tablet      hydroCHLOROthiazide (HYDRODIURIL) 25 mg tablet      atorvastatin (LIPITOR) 80 mg tablet      carvedilol (COREG) 25 mg tablet      NIFEdipine ER (ADALAT CC) 60 mg ER tablet     Ankle swelling from tight shoes. Pt has appt with Podiatry in 3 days. Asked pt to take Nifedipine at hs. Explained to patient risk benefits of the medications. Advised patient to stop meds if having any side effects. Pt verbalized understanding of the instructions. I have discussed the diagnosis with the patient and the intended plan as seen in the above orders. The patient has received an after-visit summary and questions were answered concerning future plans. I have discussed medication side effects and warnings with the patient as well.  I have reviewed the plan of care with the patient, accepted their input and they are in agreement with the treatment goals. Reviewed plan of care. Patient has provided input and agrees with goals.     Follow-up Disposition: Not on Venessa Wallis MD

## 2018-03-02 ENCOUNTER — TELEPHONE (OUTPATIENT)
Dept: FAMILY MEDICINE CLINIC | Age: 63
End: 2018-03-02

## 2018-03-06 ENCOUNTER — OFFICE VISIT (OUTPATIENT)
Dept: FAMILY MEDICINE CLINIC | Age: 63
End: 2018-03-06

## 2018-03-06 VITALS
SYSTOLIC BLOOD PRESSURE: 131 MMHG | RESPIRATION RATE: 18 BRPM | HEIGHT: 71 IN | BODY MASS INDEX: 31.78 KG/M2 | WEIGHT: 227 LBS | DIASTOLIC BLOOD PRESSURE: 78 MMHG | HEART RATE: 84 BPM | OXYGEN SATURATION: 96 % | TEMPERATURE: 98.2 F

## 2018-03-06 DIAGNOSIS — R21 SKIN RASH: Primary | ICD-10-CM

## 2018-03-06 NOTE — PROGRESS NOTES
1. Have you been to the ER, urgent care clinic since your last visit? Hospitalized since your last visit? No    2. Have you seen or consulted any other health care providers outside of the [address] [place] [address] since your last visit? Include any pap smears or colon screening.  No

## 2018-03-06 NOTE — PROGRESS NOTES
Parnell Goodpasture is a 58 y.o.  male and presents with     Chief Complaint   Patient presents with    Skin Problem       Pt took the Bumex as directed and leg swelling has improved. However he has noticed increased pigmentation over his face. He is not sure what is causing it. He is taking Hydralazine to control his Blood pressure in addition to other meds. Past Medical History:   Diagnosis Date    Biventricular ICD (implantable cardioverter-defibrillator) in place 07/16    [employer]tronic    CAD (coronary artery disease)     Cardiomyopathy, ischemic     EF 30% (04/16) 30-35% (11/15)    [facility]     DVT (deep venous thrombosis) ([facility] 08/16    L axillary vein after PPM insertion    HLD (hyperlipidemia)     Hypertension     NSTEMI (non-ST elevated myocardial infarction) ([facility]     S/P OM1 2.5 X 23 mm XIENCE (11/15)    Pulmonary emphysema ([facility]     Stroke [facility])      Past Surgical History:   Procedure Laterality Date    COLONOSCOPY N/A 12/14/2017    COLONOSCOPY performed by Dinora Adair MD at [address] HX PACEMAKER PLACEMENT      VASCULAR SURGERY PROCEDURE UNLIST      Stent placed right thigh     Current Outpatient Prescriptions   Medication Sig    budesonide-formoterol (SYMBICORT) 160-4.5 mcg/actuation HFAA Take 2 Puffs by inhalation two (2) times a day.  pantoprazole (PROTONIX) 40 mg tablet Take 1 Tab by mouth daily.  losartan (COZAAR) 100 mg tablet Take 1 Tab by mouth daily.  hydroCHLOROthiazide (HYDRODIURIL) 25 mg tablet Take 1 Tab by mouth daily.  atorvastatin (LIPITOR) 80 mg tablet Take 1 Tab by mouth nightly.  hydrALAZINE (APRESOLINE) 25 mg tablet Take 1 Tab by mouth three (3) times daily.  carvedilol (COREG) 25 mg tablet Take 0.5 Tabs by mouth two (2) times daily (with meals). 1/2 tab BID    NIFEdipine ER (ADALAT CC) 60 mg ER tablet Take 1 Tab by mouth daily.  bumetanide (BUMEX) 1 mg tablet Take 1 Tab by mouth daily.     terbinafine HCl (LAMISIL) 250 mg tablet Take 1 Tab by mouth daily.  Cholecalciferol, Vitamin D3, 50,000 unit cap Take  by mouth.  mupirocin (BACTROBAN) 2 % ointment Apply  to affected area three (3) times daily. Apply to area for 10 days    albuterol (PROVENTIL HFA, VENTOLIN HFA, PROAIR HFA) 90 mcg/actuation inhaler Take 2 Puffs by inhalation every six (6) hours as needed for Wheezing.  acetaminophen (TYLENOL EXTRA STRENGTH) 500 mg tablet Take 2 Tabs by mouth every six (6) hours as needed for Pain.  mometasone (NASONEX) 50 mcg/actuation nasal spray 2 Sprays by Both Nostrils route daily.  aspirin 81 mg chewable tablet Take 1 Tab by mouth daily. Indications: MYOCARDIAL INFARCTION PREVENTION    insulin glargine (LANTUS) 100 unit/mL injection 35 Units by SubCUTAneous route nightly. pt takes 30 Units      No current facility-administered medications for this visit. Health Maintenance   Topic Date Due    EYE EXAM RETINAL OR DILATED Q1  [birth year]29/1964    FOOT EXAM Q1  04/04/2018    MICROALBUMIN Q1  04/04/2018    MEDICARE YEARLY EXAM  04/05/2018    HEMOGLOBIN A1C Q6M  04/26/2018    LIPID PANEL Q1  10/26/2018    COLONOSCOPY  12/14/2022    DTaP/Tdap/Td series (2 - Td) 04/04/2027    Hepatitis C Screening  Completed    ZOSTER VACCINE AGE 60>  Completed    Pneumococcal 19-64 Medium Risk  Completed    Influenza Age 5 to Adult  Completed     Immunization History   Administered Date(s) Administered    Influenza Vaccine (Quad) PF 10/20/2016, 10/26/2017    Pneumococcal Vaccine (Unspecified Type) 01/01/2013     No LMP for male patient. Allergies and Intolerances: Allergies   Allergen Reactions    Lasix [Furosemide] Other (comments)    Levofloxacin Rash    Penicillins Swelling       Family History:   Family History   Problem Relation Age of Onset    Heart Disease Mother     Heart Disease Father        Social History:   He  reports that he quit smoking about 2 years ago. His smoking use included Cigarettes.  He has a 45.00 pack-year smoking history. He has never used smokeless tobacco.  He  reports that he does not drink alcohol. Review of Systems: pos for pigmentation on the face  General: negative for - chills, fatigue, fever, weight change  Psych: negative for - anxiety, depression, irritability or mood swings  ENT: negative for - headaches, hearing change, nasal congestion, oral lesions, sneezing or sore throat  Heme/ Lymph: negative for - bleeding problems, bruising, pallor or swollen lymph nodes  Endo: negative for - hot flashes, polydipsia/polyuria or temperature intolerance  Resp: negative for - cough, shortness of breath or wheezing  CV: negative for - chest pain, edema or palpitations  GI: negative for - abdominal pain, change in bowel habits, constipation, diarrhea or nausea/vomiting  : negative for - dysuria, hematuria, incontinence, pelvic pain or vulvar/vaginal symptoms  MSK: negative for - joint pain, joint swelling or muscle pain  Neuro: negative for - confusion, headaches, seizures or weakness  Derm: negative for - dry skin, hair changes, rash or skin lesion changes          Physical:   Vitals:   Vitals:    03/06/18 1217   BP: 131/78   Pulse: 84   Resp: 18   Temp: 98.2 °F (36.8 °C)   TempSrc: Oral   SpO2: 96%   Weight: 227 lb (103 kg)   Height: 5' 11\" (1.803 m)           Exam:   HEENT- atraumatic,normocephalic, awake, oriented, well nourished, hhyperpigementation on the sun exposed part of the face  Neck - supple,no enlarged lymph nodes, no JVD, no thyromegaly  Chest- CTA, no rhonchi, no crackles  Heart- rrr, no murmurs / gallop/rub  Abdomen- soft,BS+,NT, no hepatosplenomegaly  Ext - no c/c/edema   Neuro- no focal deficits. Power 5/5 all extremities  Skin - warm,dry, no obvious rashes.           Review of Data:   LABS:   Lab Results   Component Value Date/Time    WBC 6.4 10/26/2017 11:35 AM    HGB 14.3 10/26/2017 11:35 AM    HCT 44.2 10/26/2017 11:35 AM    PLATELET 662 60/82/5419 11:35 AM     Lab Results Component Value Date/Time    Sodium 143 10/26/2017 11:35 AM    Potassium 3.8 10/26/2017 11:35 AM    Chloride 102 10/26/2017 11:35 AM    CO2 25 10/26/2017 11:35 AM    Glucose 106 (H) 10/26/2017 11:35 AM    BUN 22 10/26/2017 11:35 AM    Creatinine 1.18 10/26/2017 11:35 AM     Lab Results   Component Value Date/Time    Cholesterol, total 170 10/26/2017 11:35 AM    HDL Cholesterol 57 10/26/2017 11:35 AM    LDL, calculated 89 10/26/2017 11:35 AM    Triglyceride 120 10/26/2017 11:35 AM     No results found for: GPT        Impression / Plan:        ICD-10-CM ICD-9-CM    1. Skin rash R21 782.1 HISTONE AB     R/o drug induced lupus like rash. Asked pt to stop Hydralazine Also, asked pt to wear a Sun screen. Explained to patient risk benefits of the medications. Advised patient to stop meds if having any side effects. Pt verbalized understanding of the instructions. I have discussed the diagnosis with the patient and the intended plan as seen in the above orders. The patient has received an after-visit summary and questions were answered concerning future plans. I have discussed medication side effects and warnings with the patient as well. I have reviewed the plan of care with the patient, accepted their input and they are in agreement with the treatment goals. Reviewed plan of care. Patient has provided input and agrees with goals.     Follow-up Disposition: Not on Sue Gibbons MD

## 2018-03-06 NOTE — MR AVS SNAPSHOT
[facility] 
 
 
 [address] [facility] [address] [facility] [id number] 
[phone] Patient: Jacob Macario MRN: [id number] Visit Information Date & Time Provider Department Dept. Phone Encounter #  
 3/6/2018 12:30 PM Amanda Brown, [facility] [id number] Follow-up Instructions Return in about 1 month (around 4/6/2018). Your Appointments 3/16/2018 10:45 AM  
Follow Up with Luis Qiu MD  
Cardio Specialist at [address]) Appt Note: 9 m f/u/per tickler; $45. copay/$0 past bal; 02.06.18 appt confirmed//JBJ; 9 mo f/u with Sudarshan/appt requested in the month of March-S40 copay//JBJ; r/s provider out of office [address] [address] [address] [place] [address] 4/11/2018  1:20 PM  
CARELINK with R[facility]  [facility] 1 ([facility]) Appt Note: 9 month carelink Manny [facility] Degree [phone] [address] [place] [address] [address] Upcoming Health Maintenance Date Due  
 EYE EXAM RETINAL OR DILATED Q1 1/29/1965 FOOT EXAM Q1 4/4/2018 MICROALBUMIN Q1 4/4/2018 MEDICARE YEARLY EXAM 4/5/2018 HEMOGLOBIN A1C Q6M 4/26/2018 LIPID PANEL Q1 10/26/2018 COLONOSCOPY 12/14/2022 DTaP/Tdap/Td series (2 - Td) 4/4/2027 Allergies as of 3/6/2018  Review Complete On: 3/1/2018 By: Amanda Brown MD  
  
 Severity Noted Reaction Type Reactions Lasix [Furosemide]  05/16/2016    Other (comments) Levofloxacin  05/25/2016    Rash Penicillins  11/28/2015    Swelling Current Immunizations  Reviewed on 10/20/2016 Name Date Influenza Vaccine (Quad) PF 10/26/2017, 10/20/2016 Pneumococcal Vaccine (Unspecified Type) 1/1/2013 Not reviewed this visit You Were Diagnosed With   
  
 Codes Comments Skin rash    -  Primary ICD-10-CM: R21 
ICD-9-CM: 782.1 Vitals BP Pulse Temp Resp Height(growth percentile) Weight(growth percentile) 131/78 84 98.2 °F (36.8 °C) (Oral) 18 5' 11\" (1.803 m) 227 lb (103 kg) SpO2 BMI Smoking Status 96% 31.66 kg/m2 Former Smoker Vitals History BMI and BSA Data Body Mass Index Body Surface Area  
 31.66 kg/m 2 2.27 m 2 Preferred Pharmacy Pharmacy Name Phone [facility] - [place], [address] [phone] Your Updated Medication List  
  
   
This list is accurate as of 3/6/18 12:43 PM.  Always use your most recent med list.  
  
  
  
  
 acetaminophen 500 mg tablet Commonly known as:  [address] Take 2 Tabs by mouth every six (6) hours as needed for Pain. albuterol 90 mcg/actuation inhaler Commonly known as:  PROVENTIL HFA, VENTOLIN HFA, PROAIR HFA Take 2 Puffs by inhalation every six (6) hours as needed for Wheezing. aspirin 81 mg chewable tablet Take 1 Tab by mouth daily. Indications: MYOCARDIAL INFARCTION PREVENTION  
  
 atorvastatin 80 mg tablet Commonly known as:  LIPITOR Take 1 Tab by mouth nightly. budesonide-formoterol 160-4.5 mcg/actuation Hfaa Commonly known as:  SYMBICORT Take 2 Puffs by inhalation two (2) times a day. bumetanide 1 mg tablet Commonly known as:  Alaina Rounds Take 1 Tab by mouth daily. carvedilol 25 mg tablet Commonly known as:  Midge Chihu[place]ua Take 0.5 Tabs by mouth two (2) times daily (with meals). 1/2 tab BID Cholecalciferol (Vitamin D3) 50,000 unit Cap Take  by mouth. hydrALAZINE 25 mg tablet Commonly known as:  APRESOLINE Take 1 Tab by mouth three (3) times daily. hydroCHLOROthiazide 25 mg tablet Commonly known as:  HYDRODIURIL Take 1 Tab by mouth daily. LANTUS U-100 INSULIN 100 unit/mL injection Generic drug:  insulin glargine 35 Units by SubCUTAneous route nightly. pt takes 30 Units  
  
 losartan 100 mg tablet Commonly known as:  COZAAR Take 1 Tab by mouth daily. mometasone 50 mcg/actuation nasal spray Commonly known as:  NASONEX  
2 Sprays by Both Nostrils route daily. mupirocin 2 % ointment Commonly known as:  [employer] Apply  to affected area three (3) times daily. Apply to area for 10 days NIFEdipine ER 60 mg ER tablet Commonly known as:  ADALAT CC Take 1 Tab by mouth daily. pantoprazole 40 mg tablet Commonly known as:  PROTONIX Take 1 Tab by mouth daily. terbinafine HCl 250 mg tablet Commonly known as:  LAMISIL Take 1 Tab by mouth daily. Follow-up Instructions Return in about 1 month (around 4/6/2018). To-Do List   
 03/06/2018 Lab:  BARBARA SO Introducing [facility] SERVICES! Dear Kalpana Early: Thank you for requesting a Yammer account. Our records indicate that you already have an active Yammer account. You can access your account anytime at [email or web address] Did you know that you can access your hospital and ER discharge instructions at any time in Yammer? You can also review all of your test results from your hospital stay or ER visit. Additional Information If you have questions, please visit the Frequently Asked Questions section of the Yammer website at [email or web address] Remember, Yammer is NOT to be used for urgent needs. For medical emergencies, dial 911. Now available from your iPhone and Android! Please provide this summary of care documentation to your next provider. Your primary care clinician is listed as Beth French. If you have any questions after today's visit, please call [phone].

## 2018-03-16 ENCOUNTER — OFFICE VISIT (OUTPATIENT)
Dept: CARDIOLOGY CLINIC | Age: 63
End: 2018-03-16

## 2018-03-16 VITALS
WEIGHT: 232 LBS | OXYGEN SATURATION: 96 % | DIASTOLIC BLOOD PRESSURE: 85 MMHG | BODY MASS INDEX: 32.48 KG/M2 | HEART RATE: 83 BPM | SYSTOLIC BLOOD PRESSURE: 147 MMHG | HEIGHT: 71 IN

## 2018-03-16 DIAGNOSIS — I25.83 CORONARY ARTERY DISEASE DUE TO LIPID RICH PLAQUE: Primary | ICD-10-CM

## 2018-03-16 DIAGNOSIS — E78.00 PURE HYPERCHOLESTEROLEMIA: ICD-10-CM

## 2018-03-16 DIAGNOSIS — I25.10 CORONARY ARTERY DISEASE DUE TO LIPID RICH PLAQUE: Primary | ICD-10-CM

## 2018-03-16 DIAGNOSIS — I10 ESSENTIAL HYPERTENSION WITH GOAL BLOOD PRESSURE LESS THAN 140/90: ICD-10-CM

## 2018-03-16 DIAGNOSIS — I42.9 CARDIOMYOPATHY, UNSPECIFIED TYPE (HCC): ICD-10-CM

## 2018-03-16 NOTE — MR AVS SNAPSHOT
Hali Pulse 
 
 
 [facility] [address] [address] 
[phone] Patient: Linda Verdugo MRN: [id number] Visit Information Date & Time Provider Department Dept. Phone Encounter #  
 3/16/2018 10:45 AM Luis Gan MD Cardio Specialist at [place] [address] [id number] Follow-up Instructions Return in about 9 months (around 2018). Your Appointments 2018  1:20 PM  
CARELINK with Roldan  [facility] ([facility]) Appt Note: 9 month [place]link [place] [address] [address] [address] [phone] [place]  
  
    
 2018 11:30 AM  
Follow Up with Josefina Lora MD  
Cardio Specialist at [facility]) Appt Note: 9 months follow up  
 [facility] [address] [address] [address] Upcoming Health Maintenance Date Due  
 EYE EXAM RETINAL OR DILATED Q1 1965 FOOT EXAM Q1 2018 MICROALBUMIN Q1 2018 MEDICARE YEARLY EXAM 2018 HEMOGLOBIN A1C Q6M 2018 LIPID PANEL Q1 10/26/2018 COLONOSCOPY 2022 DTaP/Tdap/Td series (2 - Td) 2027 Allergies as of 3/16/2018  Review Complete On: 3/16/2018 By: Luna Devine Severity Noted Reaction Type Reactions Lasix [Furosemide]  2016    Other (comments) Levofloxacin  2016    Rash Penicillins  2015    Swelling Current Immunizations  Reviewed on 10/20/2016 Name Date Influenza Vaccine (Quad) PF 10/26/2017, 10/20/2016 Pneumococcal Vaccine (Unspecified Type) 2013 Not reviewed this visit Vitals BP Pulse Height(growth percentile) Weight(growth percentile) SpO2 BMI  
 147/85 83 5' 11\" (1.803 m) 232 lb (105.2 kg) 96% 32.36 kg/m2 Smoking Status Former Smoker BMI and BSA Data Body Mass Index Body Surface Area  
 32.36 kg/m 2 2.3 m 2 Preferred Pharmacy Pharmacy Name Phone [facility] [place], [address] [phone] Your Updated Medication List  
  
   
This list is accurate as of 3/16/18 10:59 AM.  Always use your most recent med list.  
  
  
  
  
 acetaminophen 500 mg tablet Commonly known as:  [address] Take 2 Tabs by mouth every six (6) hours as needed for Pain. albuterol 90 mcg/actuation inhaler Commonly known as:  PROVENTIL HFA, VENTOLIN HFA, PROAIR HFA Take 2 Puffs by inhalation every six (6) hours as needed for Wheezing. aspirin 81 mg chewable tablet Take 1 Tab by mouth daily. Indications: MYOCARDIAL INFARCTION PREVENTION  
  
 atorvastatin 80 mg tablet Commonly known as:  LIPITOR Take 1 Tab by mouth nightly. budesonide-formoterol 160-4.5 mcg/actuation Hfaa Commonly known as:  SYMBICORT Take 2 Puffs by inhalation two (2) times a day. bumetanide 1 mg tablet Commonly known as:  Valente Bledsoe Take 1 Tab by mouth daily. carvedilol 25 mg tablet Commonly known as:  Lavgustabo Bunch Take 0.5 Tabs by mouth two (2) times daily (with meals). 1/2 tab BID Cholecalciferol (Vitamin D3) 50,000 unit Cap Take  by mouth. hydroCHLOROthiazide 25 mg tablet Commonly known as:  HYDRODIURIL Take 1 Tab by mouth daily. LANTUS U-100 INSULIN 100 unit/mL injection Generic drug:  insulin glargine 35 Units by SubCUTAneous route nightly. pt takes 30 Units  
  
 losartan 100 mg tablet Commonly known as:  COZAAR Take 1 Tab by mouth daily. mometasone 50 mcg/actuation nasal spray Commonly known as:  NASONEX  
2 Sprays by Both Nostrils route daily. mupirocin 2 % ointment Commonly known as:  [facility] Apply  to affected area three (3) times daily. Apply to area for 10 days NIFEdipine ER 60 mg ER tablet Commonly known as:  ADALAT CC Take 1 Tab by mouth daily. pantoprazole 40 mg tablet Commonly known as:  PROTONIX Take 1 Tab by mouth daily. terbinafine HCl 250 mg tablet Commonly known as:  LAMISIL Take 1 Tab by mouth daily. Follow-up Instructions Return in about 9 months (around 12/16/2018). Introducing [facility]! Dear Alejandro Garnica: Thank you for requesting a [employer] account. Our records indicate that you already have an active [employer] account. You can access your account anytime at [email or web address] Did you know that you can access your hospital and ER discharge instructions at any time in [employer]? You can also review all of your test results from your hospital stay or ER visit. Additional Information If you have questions, please visit the Frequently Asked Questions section of the [employer] website at [email or web address] Remember, [employer] is NOT to be used for urgent needs. For medical emergencies, dial 911. Now available from your iPhone and Android! Please provide this summary of care documentation to your next provider. Your primary care clinician is listed as Wong Left. If you have any questions after today's visit, please call [phone].

## 2018-03-16 NOTE — PROGRESS NOTES
[facility]ists    Mr. Hank Isidro is a 58 y.o. male with a history of coronary artery disease, status post OM1 stent in November, 2015, diabetes, hypertension, stroke, hyperlipidemia, cardiomyopathy s/p BiV ICD in 07/16    Mr. Hank Isidro is here today for follow up appointment. He denies any symptoms to suggest angina or heart failure. He denies any use of sublingual nitroglycerin. He denies any palpitations, presyncope or syncope. He uses two pillows at night. He denies any ICD shock. He takes his medication regularly. He takes Bumex once a day. Denies any nausea, vomiting, abdominal pain, fever, chills, sputum production. No hematuria or other bleeding complaints    Past Medical History:   Diagnosis Date    Biventricular ICD (implantable cardioverter-defibrillator) in place 07/16    Medtronic    CAD (coronary artery disease)     Cardiomyopathy, ischemic     EF 30% (04/16) 30-35% (11/15)    Diabetes ([address]     D[facility] thrombosis) [facility] 08/16    L axillary vein after PPM insertion    HLD (hyperlipidemia)     Hypertension     NSTEMI (non-ST elevated myocardial infarction) ([address]     S/P OM1 2.5 X 23 mm XIENCE (11/15)    Pulmonary emphysema ([address]     Stroke [facility])          Past Surgical History:   Procedure Laterality Date    COLONOSCOPY N/A 12/14/2017    COLONOSCOPY performed by Lilian Boss MD at [address] [place] [address] HX PACEMAKER PLACEMENT      VASCULAR SURGERY PROCEDURE UNLIST      Stent placed right thigh       Current Outpatient Prescriptions   Medication Sig    budesonide-formoterol (SYMBICORT) 160-4.5 mcg/actuation HFAA Take 2 Puffs by inhalation two (2) times a day.  pantoprazole (PROTONIX) 40 mg tablet Take 1 Tab by mouth daily.  losartan (COZAAR) 100 mg tablet Take 1 Tab by mouth daily.  hydroCHLOROthiazide (HYDRODIURIL) 25 mg tablet Take 1 Tab by mouth daily.     atorvastatin (LIPITOR) 80 mg tablet Take 1 Tab by mouth nightly.  carvedilol (COREG) 25 mg tablet Take 0.5 Tabs by mouth two (2) times daily (with meals). 1/2 tab BID    NIFEdipine ER (ADALAT CC) 60 mg ER tablet Take 1 Tab by mouth daily.  bumetanide (BUMEX) 1 mg tablet Take 1 Tab by mouth daily.  terbinafine HCl (LAMISIL) 250 mg tablet Take 1 Tab by mouth daily.  Cholecalciferol, Vitamin D3, 50,000 unit cap Take  by mouth.  mupirocin (BACTROBAN) 2 % ointment Apply  to affected area three (3) times daily. Apply to area for 10 days    albuterol (PROVENTIL HFA, VENTOLIN HFA, PROAIR HFA) 90 mcg/actuation inhaler Take 2 Puffs by inhalation every six (6) hours as needed for Wheezing.  acetaminophen (TYLENOL EXTRA STRENGTH) 500 mg tablet Take 2 Tabs by mouth every six (6) hours as needed for Pain.  mometasone (NASONEX) 50 mcg/actuation nasal spray 2 Sprays by Both Nostrils route daily.  aspirin 81 mg chewable tablet Take 1 Tab by mouth daily. Indications: MYOCARDIAL INFARCTION PREVENTION    insulin glargine (LANTUS) 100 unit/mL injection 35 Units by SubCUTAneous route nightly. pt takes 30 Units      No current facility-administered medications for this visit. Allergies and Sensitivities:  Allergies   Allergen Reactions    Lasix [Furosemide] Other (comments)    Levofloxacin Rash    Penicillins Swelling       Family History:  Family History   Problem Relation Age of Onset    Heart Disease Mother     Heart Disease Father        Social History:  Social History   Substance Use Topics    Smoking status: Former Smoker     Packs/day: 1.50     Years: 30.00     Types: Cigarettes     Quit date: 11/13/2015    Smokeless tobacco: Never Used    Alcohol use No     He  reports that he quit smoking about 2 years ago. His smoking use included Cigarettes. He has a 45.00 pack-year smoking history. He has never used smokeless tobacco.  He  reports that he does not drink alcohol. Review of Systems:  Cardiac symptoms as noted above in HPI.  All others negative. Denies malaise, skin rash, blurring vision, photophobia, neck pain, hemoptysis, chronic cough, nausea, vomiting, hematuria, burning micturition, BRBPR, chronic headaches. Physical Exam:  BP Readings from Last 3 Encounters:   03/16/18 147/85   03/06/18 131/78   03/01/18 124/73         Pulse Readings from Last 3 Encounters:   03/16/18 83   03/06/18 84   03/01/18 70          Wt Readings from Last 3 Encounters:   03/16/18 232 lb (105.2 kg)   03/06/18 227 lb (103 kg)   03/01/18 234 lb (106.1 kg)       Constitutional: Oriented to person, place, and time. HENT: Head: Normocephalic and atraumatic. Neck: No JVD present. Cardiovascular: Regular rhythm. No murmur, gallop or rubs appreciated  Lung: Breath sounds normal. No respiratory distress. No ronchi or rales appreciated  Abdominal: No tenderness. No rebound and no guarding. Musculoskeletal: There is no lower extremity edema. No cynosis. Review of Data  LABS:   Lab Results   Component Value Date/Time    Sodium 143 10/26/2017 11:35 AM    Potassium 3.8 10/26/2017 11:35 AM    Chloride 102 10/26/2017 11:35 AM    CO2 25 10/26/2017 11:35 AM    Glucose 106 (H) 10/26/2017 11:35 AM    BUN 22 10/26/2017 11:35 AM    Creatinine 1.18 10/26/2017 11:35 AM     Lipids Latest Ref Rng & Units 10/26/2017 4/4/2017 9/23/2016 5/10/2016 11/29/2015   Chol, Total 100 - 199 mg/dL 170 145 128 120 165   HDL >39 mg/dL 57 59 59 50 58   LDL 0 - 99 mg/dL 89 65 50 37 75.6   Trig 0 - 149 mg/dL 120 104 94 165(H) 157(H)   Chol/HDL Ratio 0 - 5.0   - - - - 2.8   Some recent data might be hidden     Lab Results   Component Value Date/Time    ALT (SGPT) 18 10/26/2017 11:35 AM     Lab Results   Component Value Date/Time    Hemoglobin A1c 6.2 (H) 10/26/2017 11:35 AM       EKG    ECHO (11/15)  Left ventricle: The ventricle was dilated. Systolic function was moderately reduced. Ejection fraction was estimated in the range of 30 %  to 35 %. There was mild diffuse hypokinesis.  There was severe hypokinesis of inferior/inferolateral wall. Wall thickness was mildly increased. Doppler parameters were consistent with abnormal left ventricular relaxation (grade 1 diastolic dysfunction). Right ventricle: Systolic function was normal.  Aortic valve: There was no stenosis. ECHO (04/16)  Left ventricle: Size was at the upper limits of normal. Systolic function  was moderately to markedly reduced by visual assessment. Ejection fraction  was estimated to be 30 %. There was severe hypokinesis of the basal-mid  inferior and basal-mid inferolateral wall(s). Wall thickness was mildly  increased. Doppler parameters were consistent with abnormal left  ventricular relaxation (grade 1 diastolic dysfunction). Right ventricle: Systolic function was normal. Estimated peak pressure was in the range of 30 mmHg to 35 mmHg. Left atrium: The atrium was mildly dilated. Right atrium: The atrium was mildly dilated. Mitral valve: There was mild annular calcification. There was mild regurgitation. Aortic valve: There was no stenosis. Tricuspid valve: There was trivial regurgitation. CATHETERIZATION (11/15)  - LVEF estimated to be 30% with diffuse hypokinesis. No significant mitral regurgitation was noted. - LM: calcification, 20% distal   - LAD had diffuse 50% stenosis in its midportion of the vessel with only luminal irregularities in the proximal LAD. There was a  discrete 50% distal vessel stenosis, as well. The diagonal branches had mild disease.   - LCx: Circumflex had an ostial 50-60% stenosis,   - OM1: 100% thrombotic occlusion proximally. There was very faint collateral filling from the RCA. - RCA: Prox  50% diffuse, 40% distal disease. The right PDA had an 80% ostial stenosis and then a 90% mid vessel stenosis. The right  posterolateral branch had a 90% stenosis in a sub branch. 4. PCI: 100% OM-1 stenosis reduced to 0%.   drug-eluting stent, Xience 2.5 x 23 mm     IMPRESSION & PLAN:  Mr. Melinda Hutchins is a 58 y.o. male with cardiomyopathy, coronary artery disease, hypertension, hyperlipidemia. Coronary artery disease:  Mr. Nacho Dunbar had an OM1 stent in November, 2015. No angina. No use of S/L NTG since last visit. Continue with medical management, including aspirin lifelong. He is also on Coreg and Lipitor. Cardiomyopathy:    His initial ejection fraction in a setting of NSTEMI was 30-35% in November 2015. A repeat EF remains 30% in April 2016, despite being on appropriate medical management. S/P Bi-V AICD placement by  in 07/16. On Coreg, Nifedipine, Losartan, hydrochlorothiazide. No ICD shock. He is also on Bumex. NO fluid overload on exam today    Hypertension:  BP is 147/85 mm hg. Continue same meds. Hyperlipidemia:  He is on Atorvastatin 80 mg daily. Continue same. Last LDL 89. Importance of diet and exercise was discussed with patient. This plan was discussed with patient who is in agreement. Thank you for allowing me to participate in patient care. Please feel free to call me if you have any question or concern. Gómez Castro MD  Please note: This document has been produced using voice recognition software. Unrecognized errors in transcription may be present.

## 2018-03-16 NOTE — PROGRESS NOTES
1. Have you been to the ER, urgent care clinic since your last visit? Hospitalized since your last visit? No    2. Have you seen or consulted any other health care providers outside of the [address] [place] [address] since your last visit? Include any pap smears or colon screening.  No

## 2018-04-04 PROCEDURE — 99001 SPECIMEN HANDLING PT-LAB: CPT | Performed by: INTERNAL MEDICINE

## 2018-04-05 ENCOUNTER — HOSPITAL ENCOUNTER (OUTPATIENT)
Dept: LAB | Age: 63
Discharge: HOME | End: 2018-04-05

## 2018-04-05 ENCOUNTER — OFFICE VISIT (OUTPATIENT)
Dept: FAMILY MEDICINE CLINIC | Age: 63
End: 2018-04-05

## 2018-04-05 VITALS
OXYGEN SATURATION: 97 % | TEMPERATURE: 96.7 F | WEIGHT: 234 LBS | BODY MASS INDEX: 32.76 KG/M2 | SYSTOLIC BLOOD PRESSURE: 141 MMHG | DIASTOLIC BLOOD PRESSURE: 94 MMHG | RESPIRATION RATE: 18 BRPM | HEART RATE: 79 BPM | HEIGHT: 71 IN

## 2018-04-05 DIAGNOSIS — I10 ESSENTIAL HYPERTENSION: ICD-10-CM

## 2018-04-05 DIAGNOSIS — E11.40 TYPE 2 DIABETES MELLITUS WITH DIABETIC NEUROPATHY, WITH LONG-TERM CURRENT USE OF INSULIN (HCC): Primary | ICD-10-CM

## 2018-04-05 DIAGNOSIS — Z12.5 PROSTATE CANCER SCREENING: ICD-10-CM

## 2018-04-05 DIAGNOSIS — Z79.4 TYPE 2 DIABETES MELLITUS WITH DIABETIC NEUROPATHY, WITH LONG-TERM CURRENT USE OF INSULIN (HCC): Primary | ICD-10-CM

## 2018-04-05 DIAGNOSIS — I25.10 CORONARY ARTERY DISEASE INVOLVING NATIVE CORONARY ARTERY OF NATIVE HEART WITHOUT ANGINA PECTORIS: ICD-10-CM

## 2018-04-05 DIAGNOSIS — R21 SKIN RASH: ICD-10-CM

## 2018-04-05 NOTE — PROGRESS NOTES
This is the Subsequent Medicare Annual Wellness Exam, performed 12 months or more after the Initial AWV or the last Subsequent AWV    I have reviewed the patient's medical history in detail and updated the computerized patient record. History     Past Medical History:   Diagnosis Date    Biventricular ICD (implantable cardioverter-defibrillator) in place 07/16    Medtronic    CAD (coronary artery disease)     Cardiomyopathy, ischemic     EF 30% (04/16) 30-35% (11/15)    [facility]     [facility]    L axillary vein after PPM insertion    HLD (hyperlipidemia)     Hypertension     NSTEMI (non-ST elevated myocardial infarction) ([facility]     S/P OM1 2.5 X 23 mm XIENCE (11/15)    Pulmonary emphysema ([facility]     Stroke [facility])       Past Surgical History:   Procedure Laterality Date    COLONOSCOPY N/A 12/14/2017    COLONOSCOPY performed by Srinath Villela MD at [address] HX PACEMAKER PLACEMENT      VASCULAR SURGERY PROCEDURE UNLIST      Stent placed right thigh     Current Outpatient Prescriptions   Medication Sig Dispense Refill    budesonide-formoterol (SYMBICORT) 160-4.5 mcg/actuation HFAA Take 2 Puffs by inhalation two (2) times a day. 1 Inhaler 3    pantoprazole (PROTONIX) 40 mg tablet Take 1 Tab by mouth daily. 30 Tab 1    losartan (COZAAR) 100 mg tablet Take 1 Tab by mouth daily. 90 Tab 3    hydroCHLOROthiazide (HYDRODIURIL) 25 mg tablet Take 1 Tab by mouth daily. 90 Tab 3    atorvastatin (LIPITOR) 80 mg tablet Take 1 Tab by mouth nightly. 30 Tab 5    carvedilol (COREG) 25 mg tablet Take 0.5 Tabs by mouth two (2) times daily (with meals). 1/2 tab BID 30 Tab 3    NIFEdipine ER (ADALAT CC) 60 mg ER tablet Take 1 Tab by mouth daily. 90 Tab 3    bumetanide (BUMEX) 1 mg tablet Take 1 Tab by mouth daily. 10 Tab 0    terbinafine HCl (LAMISIL) 250 mg tablet Take 1 Tab by mouth daily. 30 Tab 2    Cholecalciferol, Vitamin D3, 50,000 unit cap Take  by mouth.       mupirocin (BACTROBAN) 2 % ointment Apply  to affected area three (3) times daily. Apply to area for 10 days 22 g 0    albuterol (PROVENTIL HFA, VENTOLIN HFA, PROAIR HFA) 90 mcg/actuation inhaler Take 2 Puffs by inhalation every six (6) hours as needed for Wheezing. 1 Inhaler 3    acetaminophen (TYLENOL EXTRA STRENGTH) 500 mg tablet Take 2 Tabs by mouth every six (6) hours as needed for Pain. 50 Tab 0    mometasone (NASONEX) 50 mcg/actuation nasal spray 2 Sprays by Both Nostrils route daily. 2 Container 3    aspirin 81 mg chewable tablet Take 1 Tab by mouth daily. Indications: MYOCARDIAL INFARCTION PREVENTION 100 Tab 5    insulin glargine (LANTUS) 100 unit/mL injection 35 Units by SubCUTAneous route nightly. pt takes 30 Units        Allergies   Allergen Reactions    Lasix [Furosemide] Other (comments)    Levofloxacin Rash    Penicillins Swelling     Family History   Problem Relation Age of Onset    Heart Disease Mother     Heart Disease Father      Social History   Substance Use Topics    Smoking status: Former Smoker     Packs/day: 1.50     Years: 30.00     Types: Cigarettes     Quit date: 11/13/2015    Smokeless tobacco: Never Used    Alcohol use No     Patient Active Problem List   Diagnosis Code    Hypertension I10    Type II or unspecified type diabetes mellitus without mention of complication, not stated as uncontrolled E11.9    Acute lacunar stroke ([facility] [address] I63.9    HLD (hyperlipidemia) E78.5    ACS (acute coronary syndrome) ([facility] I24.9    Elevated troponin R74.8    Hearing impairment H91.90    Coronary artery disease involving native coronary artery without angina pectoris I25.10    AICD (automatic cardioverter/defibrillator) present Z95.810    Emphysema of lung (HCC) J43.9    Ex-smoker Z87.891    Restrictive ventilatory defect R94.2    Obesity (BMI 30-39. 9) E66.9    Chronic combined systolic and diastolic heart failure (HCC) I50.42    Deep venous thrombosis (HCC) I82.409    Hypercholesteremia E78.00    Type 2 diabetes mellitus with diabetic neuropathy, with long-term current use of insulin ([facility]) E11.40, Z79.4    Deep vein thrombosis (DVT) of left upper extremity ([facility]) I82.622    PAD (peripheral artery disease) ([facility]) I73.9    Essential hypertension I10    Coronary artery disease involving native coronary artery of native heart without angina pectoris I25.10       Depression Risk Factor Screening:     PHQ over the last two weeks 2/1/2017   Little interest or pleasure in doing things Not at all   Feeling down, depressed or hopeless Not at all   Total Score PHQ 2 0     Alcohol Risk Factor Screening: You do not drink alcohol or very rarely. Functional Ability and Level of Safety:   Hearing Loss  Hearing is good. The patient wears hearing aids. Activities of Daily Living  The home contains: no safety equipment. Patient does total self care    Fall Risk  No flowsheet data found. Abuse Screen  Patient is not abused    Cognitive Screening   Evaluation of Cognitive Function:  Has your family/caregiver stated any concerns about your memory: no  Normal    Patient Care Team   Patient Care Team:  [id number] [address] Lorri Weinberg MD as PCP - General (Internal Medicine)  Theresa Bills MD (Cardiology)  Arlen Anderson NP (Nurse Practitioner)    Assessment/Plan   Education and counseling provided:  Are appropriate based on today's review and evaluation    Diagnoses and all orders for this visit:    1. Type 2 diabetes mellitus with diabetic neuropathy, with long-term current use of insulin ([facility])  -     HEMOGLOBIN A1C WITH EAG; Future  -     MICROALBUMIN, UR, RAND W/ MICROALB/CREAT RATIO; Future  -     METABOLIC PANEL, COMPREHENSIVE; Future    2. Essential hypertension    3. Coronary artery disease involving native coronary artery of native heart without angina pectoris  -     LIPID PANEL; Future    4. Prostate cancer screening  -     PSA - SCREENING (); Future    5.  Skin rash  -     HISTONE AB; Future        Health Maintenance Due   Topic Date Due    EYE EXAM RETINAL OR DILATED Q1  01/29/1965    FOOT EXAM Q1  04/04/2018    MICROALBUMIN Q1  04/04/2018    HEMOGLOBIN A1C Q6M  04/26/2018     -----------------------------------------------------------      HTN - if blood pressure stays up - will increase dose of Nifedipine    Skin rash , hyperpigmentation resolving , pt stopped taking hydralazine but pt has not doen anti histone ab.

## 2018-04-05 NOTE — MR AVS SNAPSHOT
[address] [facility] 
 
 
 [address] [facility] [address] [facility] 83 [address] 
[phone] Patient: Krzysztof Lamas MRN: [id number] EVT:7/33/3030 Visit Information Date & Time Provider Department Dept. Phone Encounter #  
 4/5/2018 10:30 AM [id number], [facility] [phone] [id number] Follow-up Instructions Return in about 3 months (around 7/5/2018), or if symptoms worsen or fail to improve. Your Appointments 4/5/2018 10:30 AM  
Follow Up with [address] STEFFI We[address] MD  
[address]) Appt Note: 1mos f/u appt [address] [facility] [address] [facility] 83 222 [facility]  
  
   
 [address] [facility] [address] 4/11/2018  1:20 PM  
CARELINK with Roldan  [facility] ([facility]) Appt Note: 9 month carelink Manny Hines [phone]  
[phone] Obed Crowley  
  
    
 12/13/2018 11:30 AM  
Follow Up with Migue Steel MD  
Cardio Specialist at [address]) Appt Note: 9 months follow up  
 [facility] [address] [address] [address] [place] Upcoming Health Maintenance Date Due  
 EYE EXAM RETINAL OR DILATED Q1 1/29/1965 FOOT EXAM Q1 4/4/2018 MICROALBUMIN Q1 4/4/2018 HEMOGLOBIN A1C Q6M 4/26/2018 LIPID PANEL Q1 10/26/2018 MEDICARE YEARLY EXAM 4/6/2019 COLONOSCOPY 12/14/2022 DTaP/Tdap/Td series (2 - Td) 4/4/2027 Allergies as of 4/5/2018  Review Complete On: 3/16/2018 By: Kasey Irwin Severity Noted Reaction Type Reactions Lasix [Furosemide]  05/16/2016    Other (comments) Levofloxacin  05/25/2016    Rash Penicillins  11/28/2015    Swelling Current Immunizations  Reviewed on 10/20/2016 Name Date Influenza Vaccine (Quad) PF 10/26/2017, 10/20/2016 Pneumococcal Vaccine (Unspecified Type) 1/1/2013 Not reviewed this visit You Were Diagnosed With   
  
 Codes Comments Type 2 diabetes mellitus with diabetic neuropathy, with long-term current use of insulin (HCC)    -  Primary ICD-10-CM: E11.40, Z79.4 ICD-9-CM: 250.60, 357.2, V58.67 Essential hypertension     ICD-10-CM: I10 
ICD-9-CM: 401.9 Coronary artery disease involving native coronary artery of native heart without angina pectoris     ICD-10-CM: I25.10 ICD-9-CM: 414.01 Prostate cancer screening     ICD-10-CM: Z12.5 ICD-9-CM: V76.44 Skin rash     ICD-10-CM: R21 
ICD-9-CM: 782.1 Vitals BP Pulse Temp Resp Height(growth percentile) Weight(growth percentile) (!) 141/94 (BP 1 Location: Right arm, BP Patient Position: At rest) 79 96.7 °F (35.9 °C) (Oral) 18 5' 11\" (1.803 m) 234 lb (106.1 kg) SpO2 BMI Smoking Status 97% 32.64 kg/m2 Former Smoker BMI and BSA Data Body Mass Index Body Surface Area  
 32.64 kg/m 2 2.31 m 2 Preferred Pharmacy Pharmacy Name Phone [facility] - [address], [address] [phone] Your Updated Medication List  
  
   
This list is accurate as of 4/5/18 10:27 AM.  Always use your most recent med list.  
  
  
  
  
 acetaminophen 500 mg tablet Commonly known as:  [address] Se Take 2 Tabs by mouth every six (6) hours as needed for Pain. albuterol 90 mcg/actuation inhaler Commonly known as:  PROVENTIL HFA, VENTOLIN HFA, PROAIR HFA Take 2 Puffs by inhalation every six (6) hours as needed for Wheezing. aspirin 81 mg chewable tablet Take 1 Tab by mouth daily. Indications: MYOCARDIAL INFARCTION PREVENTION  
  
 atorvastatin 80 mg tablet Commonly known as:  LIPITOR Take 1 Tab by mouth nightly. budesonide-formoterol 160-4.5 mcg/actuation Hfaa Commonly known as:  SYMBICORT  
 Take 2 Puffs by inhalation two (2) times a day. bumetanide 1 mg tablet Commonly known as:  Megan Mellow Take 1 Tab by mouth daily. carvedilol 25 mg tablet Commonly known as:  Jackson Elders Take 0.5 Tabs by mouth two (2) times daily (with meals). 1/2 tab BID Cholecalciferol (Vitamin D3) 50,000 unit Cap Take  by mouth. hydroCHLOROthiazide 25 mg tablet Commonly known as:  HYDRODIURIL Take 1 Tab by mouth daily. LANTUS U-100 INSULIN 100 unit/mL injection Generic drug:  insulin glargine 35 Units by SubCUTAneous route nightly. pt takes 30 Units  
  
 losartan 100 mg tablet Commonly known as:  COZAAR Take 1 Tab by mouth daily. mometasone 50 mcg/actuation nasal spray Commonly known as:  NASONEX  
2 Sprays by Both Nostrils route daily. mupirocin 2 % ointment Commonly known as:  [employer] Apply  to affected area three (3) times daily. Apply to area for 10 days NIFEdipine ER 60 mg ER tablet Commonly known as:  ADALAT CC Take 1 Tab by mouth daily. pantoprazole 40 mg tablet Commonly known as:  PROTONIX Take 1 Tab by mouth daily. terbinafine HCl 250 mg tablet Commonly known as:  LAMISIL Take 1 Tab by mouth daily. Follow-up Instructions Return in about 3 months (around 7/5/2018), or if symptoms worsen or fail to improve. To-Do List   
 04/05/2018 Lab:  HEMOGLOBIN A1C WITH EAG   
  
 04/05/2018 Lab:  HISTONE AB   
  
 04/05/2018 Lab:  LIPID PANEL   
  
 04/05/2018 Lab:  METABOLIC PANEL, COMPREHENSIVE   
  
 04/05/2018 Lab:  MICROALBUMIN, UR, RAND W/ MICROALB/CREAT RATIO   
  
 04/05/2018 Lab:  PSA SCREENING (SCREENING) Patient Instructions Medicare Wellness Visit, Male The best way to live healthy is to have a healthy lifestyle by eating a well-balanced diet, exercising regularly, limiting alcohol and stopping smoking. Regular physical exams and screening tests are another way to keep healthy. Preventive exams provided by your health care provider can find health problems before they become diseases or illnesses. Preventive services including immunizations, screening tests, monitoring and exams can help you take care of your own health. All people over age 72 should have a pneumovax  and and a prevnar shot to prevent pneumonia. These are once in a lifetime unless you and your provider decide differently. All people over 65 should have a yearly flu shot and a tetanus vaccine every 10 years. Screening for diabetes mellitus with a blood sugar test should be done every year. Glaucoma is a disease of the eye due to increased ocular pressure that can lead to blindness and it should be done every year by an eye professional. 
 
Cardiovascular screening tests that check for elevated lipids (fatty part of blood) which can lead to heart disease and strokes should be done every 5 years. Colorectal screening that evaluates for blood or polyps in your colon should be done yearly as a stool test or every five years as a flexible sigmoidoscope or every 10 years as a colonoscopy up to age 76. Men up to age 76 may need a screening blood test for prostate cancer at certain intervals, depending on their personal and family history. This decision is between the patient and his provider. If you have been a smoker or had family history of abdominal aortic aneurysms, you and your provider may decide to schedule an ultrasound test of your aorta. Hepatitis C screening is also recommended for anyone born between [birth year] through [address]. A shingles vaccine is also recommended once in a lifetime after age 61. Your Medicare Wellness Exam is recommended annually. Here is a list of your current Health Maintenance items with a due date: 
Health Maintenance Due Topic Date Due Carola Corona Eye Exam  01/29/1965 Ifeanyi Ellis Diabetic Foot Care  04/04/2018  Albumin Urine Test  04/04/2018  Hemoglobin A1C    04/26/2018 [facility]! Dear Christine Burgess: Thank you for requesting a [employer] account. Our records indicate that you already have an active [employer] account. You can access your account anytime at [email or web address] Did you know that you can access your hospital and ER discharge instructions at any time in [employer]? You can also review all of your test results from your hospital stay or ER visit. Additional Information If you have questions, please visit the Frequently Asked Questions section of the [employer] website at [email or web address] Remember, [employer] is NOT to be used for urgent needs. For medical emergencies, dial 911. Now available from your iPhone and Android! Please provide this summary of care documentation to your next provider. Your primary care clinician is listed as [id number]. If you have any questions after today's visit, please call [phone].

## 2018-04-05 NOTE — ACP (ADVANCE CARE PLANNING)
Advance Care Planning (ACP) Provider Note - Comprehensive     Date of ACP Conversation: 04/05/18  Persons included in Conversation:  patient  Length of ACP Conversation in minutes:  <16 minutes (Non-Billable)    Authorized Decision Maker (if patient is incapable of making informed decisions): This person is:  pts sister          General ACP for ALL Patients with Decision Making Capacity:   Importance of advance care planning, including choosing a healthcare agent to communicate patient's healthcare decisions if patient lost the ability to make decisions, such as after a sudden illness or accident  Understanding of the healthcare agent role was assessed and information provided    Review of Existing Advance Directive:  Does this advance directive still reflect your preferences? Yes (Provide new form/Refer for assistance in updating)    For Serious or Chronic Illness:  Understanding of medical condition    Understanding of CPR, goals and expected outcomes, benefits and burdens discussed.     Interventions Provided:  Reviewed existing Advance Directive

## 2018-04-05 NOTE — PROGRESS NOTES
Chief Complaint   Patient presents with    Hypertension    Cholesterol Problem     1. Have you been to the ER, urgent care clinic since your last visit? Hospitalized since your last visit? No    2. Have you seen or consulted any other health care providers outside of the [address] [place] [address] since your last visit? Include any pap smears or colon screening.  No

## 2018-04-05 NOTE — PATIENT INSTRUCTIONS

## 2018-04-06 LAB
ALBUMIN/CREAT UR: 7.6 MG/G CREAT (ref 0–30)
CREAT UR-MCNC: 102.6 MG/DL
MICROALBUMIN UR-MCNC: 7.8 UG/ML

## 2018-04-10 ENCOUNTER — OFFICE VISIT (OUTPATIENT)
Dept: FAMILY MEDICINE CLINIC | Age: 63
End: 2018-04-10

## 2018-04-10 VITALS
SYSTOLIC BLOOD PRESSURE: 136 MMHG | OXYGEN SATURATION: 97 % | HEART RATE: 72 BPM | HEIGHT: 71 IN | WEIGHT: 234 LBS | BODY MASS INDEX: 32.76 KG/M2 | RESPIRATION RATE: 14 BRPM | TEMPERATURE: 97.2 F | DIASTOLIC BLOOD PRESSURE: 84 MMHG

## 2018-04-10 DIAGNOSIS — I10 ESSENTIAL HYPERTENSION: Primary | ICD-10-CM

## 2018-04-10 LAB
ALBUMIN SERPL-MCNC: 4 G/DL (ref 3.6–4.8)
ALBUMIN/GLOB SERPL: 1.4 {RATIO} (ref 1.2–2.2)
ALP SERPL-CCNC: 88 IU/L (ref 39–117)
ALT SERPL-CCNC: 22 IU/L (ref 0–44)
AST SERPL-CCNC: 22 IU/L (ref 0–40)
BILIRUB SERPL-MCNC: 0.3 MG/DL (ref 0–1.2)
BUN SERPL-MCNC: 24 MG/DL (ref 8–27)
BUN/CREAT SERPL: 19 (ref 10–24)
CALCIUM SERPL-MCNC: 9.1 MG/DL (ref 8.6–10.2)
CHLORIDE SERPL-SCNC: 101 MMOL/L (ref 96–106)
CHOLEST SERPL-MCNC: 157 MG/DL (ref 100–199)
CO2 SERPL-SCNC: 23 MMOL/L (ref 18–29)
CREAT SERPL-MCNC: 1.24 MG/DL (ref 0.76–1.27)
EST. AVERAGE GLUCOSE BLD GHB EST-MCNC: 140 MG/DL
GFR SERPLBLD CREATININE-BSD FMLA CKD-EPI: 61 ML/MIN/1.73
GFR SERPLBLD CREATININE-BSD FMLA CKD-EPI: 71 ML/MIN/1.73
GLOBULIN SER CALC-MCNC: 2.9 G/DL (ref 1.5–4.5)
GLUCOSE SERPL-MCNC: 94 MG/DL (ref 65–99)
HBA1C MFR BLD: 6.5 % (ref 4.8–5.6)
HDLC SERPL-MCNC: 61 MG/DL
HISTONE IGG SER IA-ACNC: 0.4 UNITS (ref 0–0.9)
INTERPRETATION, 910389: NORMAL
LDLC SERPL CALC-MCNC: 76 MG/DL (ref 0–99)
Lab: NORMAL
POTASSIUM SERPL-SCNC: 3.7 MMOL/L (ref 3.5–5.2)
PROT SERPL-MCNC: 6.9 G/DL (ref 6–8.5)
PSA SERPL-MCNC: 1.6 NG/ML (ref 0–4)
SODIUM SERPL-SCNC: 139 MMOL/L (ref 134–144)
TRIGL SERPL-MCNC: 98 MG/DL (ref 0–149)
VLDLC SERPL CALC-MCNC: 20 MG/DL (ref 5–40)

## 2018-04-10 RX ORDER — NITROGLYCERIN 0.4 MG/1
TABLET SUBLINGUAL
COMMUNITY
End: 2019-01-07

## 2018-04-10 RX ORDER — HYDRALAZINE HYDROCHLORIDE 25 MG/1
25 TABLET, FILM COATED ORAL 3 TIMES DAILY
Qty: 90 TAB | Refills: 3 | Status: SHIPPED | OUTPATIENT
Start: 2018-04-10 | End: 2018-07-05 | Stop reason: SDUPTHER

## 2018-04-10 NOTE — MR AVS SNAPSHOT
[address]  [address] [address] 
[phone] Patient: Roberto Pina MRN: [id number] TSH:4/48/2321 Visit Information Date & Time Provider Department Dept. Phone Encounter #  
 4/10/2018 10:15 AM Clifton Jules [facility] [phone] Follow-up Instructions Return in about 2 months (around 6/10/2018). Your Appointments 4/11/2018  1:20 PM  
CARELINK with Roldan Ramosi [facility] ([facility]) Appt Note: 9 month carelink [place] [address] [place] [address]  
[phone] Wilson Carline  
  
    
 12/13/2018 11:30 AM  
Follow Up with Linda Warren MD  
Cardio Specialist at [facility]) Appt Note: 9 months follow up  
 [place] [address] [address] [address] [address] Upcoming Health Maintenance Date Due  
 EYE EXAM RETINAL OR DILATED Q1 1/29/1965 FOOT EXAM Q1 4/4/2018 HEMOGLOBIN A1C Q6M 4/26/2018 LIPID PANEL Q1 10/26/2018 MICROALBUMIN Q1 4/5/2019 MEDICARE YEARLY EXAM 4/6/2019 COLONOSCOPY 12/14/2022 DTaP/Tdap/Td series (2 - Td) 4/4/2027 Allergies as of 4/10/2018  Review Complete On: 4/10/2018 By: Juan Orellana LPN Severity Noted Reaction Type Reactions Lasix [Furosemide]  05/16/2016    Other (comments) Levofloxacin  05/25/2016    Rash Penicillins  11/28/2015    Swelling Current Immunizations  Reviewed on 10/20/2016 Name Date Influenza Vaccine (Quad) PF 10/26/2017, 10/20/2016 Pneumococcal Vaccine (Unspecified Type) 1/1/2013 Not reviewed this visit You Were Diagnosed With   
  
 Codes Comments Essential hypertension    -  Primary ICD-10-CM: I10 
ICD-9-CM: 401.9 Vitals BP Pulse Temp Resp Height(growth percentile) Weight(growth percentile) 136/84 72 97.2 °F (36.2 °C) (Oral) 14 5' 11\" (1.803 m) 234 lb (106.1 kg) SpO2 BMI Smoking Status 97% 32.64 kg/m2 Former Smoker Vitals History BMI and BSA Data Body Mass Index Body Surface Area  
 32.64 kg/m 2 2.31 m 2 Preferred Pharmacy Pharmacy Name Phone [facility] - [address], [address] [phone] Your Updated Medication List  
  
   
This list is accurate as of 4/10/18 11:21 AM.  Always use your most recent med list.  
  
  
  
  
 acetaminophen 500 mg tablet Commonly known as:  [address] Se Take 2 Tabs by mouth every six (6) hours as needed for Pain. albuterol 90 mcg/actuation inhaler Commonly known as:  PROVENTIL HFA, VENTOLIN HFA, PROAIR HFA Take 2 Puffs by inhalation every six (6) hours as needed for Wheezing. aspirin 81 mg chewable tablet Take 1 Tab by mouth daily. Indications: MYOCARDIAL INFARCTION PREVENTION  
  
 atorvastatin 80 mg tablet Commonly known as:  LIPITOR Take 1 Tab by mouth nightly. budesonide-formoterol 160-4.5 mcg/actuation Hfaa Commonly known as:  SYMBICORT Take 2 Puffs by inhalation two (2) times a day. carvedilol 25 mg tablet Commonly known as:  Devennkechi Blackwell Take 0.5 Tabs by mouth two (2) times daily (with meals). 1/2 tab BID Cholecalciferol (Vitamin D3) 50,000 unit Cap Take  by mouth. hydrALAZINE 25 mg tablet Commonly known as:  APRESOLINE Take 1 Tab by mouth three (3) times daily. hydroCHLOROthiazide 25 mg tablet Commonly known as:  HYDRODIURIL Take 1 Tab by mouth daily. LANTUS U-100 INSULIN 100 unit/mL injection Generic drug:  insulin glargine 35 Units by SubCUTAneous route nightly. pt takes 30 Units  
  
 losartan 100 mg tablet Commonly known as:  COZAAR Take 1 Tab by mouth daily. mometasone 50 mcg/actuation nasal spray Commonly known as:  NASONEX  
2 Sprays by Both Nostrils route daily. NIFEdipine ER 60 mg ER tablet Commonly known as:  ADALAT CC Take 1 Tab by mouth daily. NITROSTAT 0.4 mg SL tablet Generic drug:  nitroglycerin  
by SubLINGual route every five (5) minutes as needed for Chest Pain.  
  
 pantoprazole 40 mg tablet Commonly known as:  PROTONIX Take 1 Tab by mouth daily. terbinafine HCl 250 mg tablet Commonly known as:  LAMISIL Take 1 Tab by mouth daily. Prescriptions Sent to Pharmacy Refills  
 hydrALAZINE (APRESOLINE) 25 mg tablet 3 Sig: Take 1 Tab by mouth three (3) times daily. Class: Normal  
 Pharmacy: [address] [place] [address] I, [address]  #: [phone] Route: Oral  
  
Follow-up Instructions Return in about 2 months (around 6/10/2018). Introducing [facility]! Dear Jason Fontana: Thank you for requesting a [employer] account. Our records indicate that you already have an active [employer] account. You can access your account anytime at [email or web address] [email or web address] Did you know that you can access your hospital and ER discharge instructions at any time in [employer]? You can also review all of your test results from your hospital stay or ER visit. Additional Information If you have questions, please visit the Frequently Asked Questions section of the [employer] website at [email or web address] [email or web address]/. Remember, [employer] is NOT to be used for urgent needs. For medical emergencies, dial 911. Now available from your iPhone and Android! Please provide this summary of care documentation to your next provider. Your primary care clinician is listed as Ivan Sylvia. If you have any questions after today's visit, please call [phone].

## 2018-04-10 NOTE — PROGRESS NOTES
Roberto Pina is a 58 y.o.  male and presents with     Chief Complaint   Patient presents with    Hypertension       Pt stopped taking the hydarlazine as directed. Pt was checking his blood pressure yesterday and it was little high. It was 150/101, 162/102. Pt got concerned. However this morning his blood pressure was better. It was 125/83, 131/89. Pt feels okay. No chets pain , SOB. Headaches, dizziness. Past Medical History:   Diagnosis Date    Biventricular ICD (implantable cardioverter-defibrillator) in place 07/16    [employer]    CAD (coronary artery disease)     Cardiomyopathy, ischemic     EF 30% (04/16) 30-35% (11/15)    Diabetes [facility]     DVT (deep venous thrombosis) ([facility] 08/16    L axillary vein after PPM insertion    HLD (hyperlipidemia)     Hypertension     NSTEMI (non-ST elevated myocardial infarction) ([facility]     S/P OM1 2.5 X 23 mm XIENCE (11/15)    Pulmonary emphysema ([facility]     Stroke [facility])      Past Surgical History:   Procedure Laterality Date    COLONOSCOPY N/A 12/14/2017    COLONOSCOPY performed by Jamee Dominguez MD at [address] [place] [address] HX PACEMAKER PLACEMENT      VASCULAR SURGERY PROCEDURE UNLIST      Stent placed right thigh     Current Outpatient Prescriptions   Medication Sig    nitroglycerin (NITROSTAT) 0.4 mg SL tablet by SubLINGual route every five (5) minutes as needed for Chest Pain.  hydrALAZINE (APRESOLINE) 25 mg tablet Take 1 Tab by mouth three (3) times daily.  budesonide-formoterol (SYMBICORT) 160-4.5 mcg/actuation HFAA Take 2 Puffs by inhalation two (2) times a day.  pantoprazole (PROTONIX) 40 mg tablet Take 1 Tab by mouth daily.  losartan (COZAAR) 100 mg tablet Take 1 Tab by mouth daily.  hydroCHLOROthiazide (HYDRODIURIL) 25 mg tablet Take 1 Tab by mouth daily.  atorvastatin (LIPITOR) 80 mg tablet Take 1 Tab by mouth nightly.  carvedilol (COREG) 25 mg tablet Take 0.5 Tabs by mouth two (2) times daily (with meals).  1/2 tab BID    NIFEdipine ER (ADALAT CC) 60 mg ER tablet Take 1 Tab by mouth daily.  terbinafine HCl (LAMISIL) 250 mg tablet Take 1 Tab by mouth daily.  Cholecalciferol, Vitamin D3, 50,000 unit cap Take  by mouth.  albuterol (PROVENTIL HFA, VENTOLIN HFA, PROAIR HFA) 90 mcg/actuation inhaler Take 2 Puffs by inhalation every six (6) hours as needed for Wheezing.  acetaminophen (TYLENOL EXTRA STRENGTH) 500 mg tablet Take 2 Tabs by mouth every six (6) hours as needed for Pain.  aspirin 81 mg chewable tablet Take 1 Tab by mouth daily. Indications: MYOCARDIAL INFARCTION PREVENTION    mometasone (NASONEX) 50 mcg/actuation nasal spray 2 Sprays by Both Nostrils route daily.  insulin glargine (LANTUS) 100 unit/mL injection 35 Units by SubCUTAneous route nightly. pt takes 30 Units      No current facility-administered medications for this visit. Health Maintenance   Topic Date Due    EYE EXAM RETINAL OR DILATED Q1  01/29/1965    FOOT EXAM Q1  04/04/2018    HEMOGLOBIN A1C Q6M  04/26/2018    LIPID PANEL Q1  10/26/2018    MICROALBUMIN Q1  04/05/2019    MEDICARE YEARLY EXAM  04/06/2019    COLONOSCOPY  12/14/2022    DTaP/Tdap/Td series (2 - Td) 04/04/2027    Hepatitis C Screening  Completed    ZOSTER VACCINE AGE 60>  Completed    Pneumococcal 19-64 Medium Risk  Completed    Influenza Age 5 to Adult  Completed     Immunization History   Administered Date(s) Administered    Influenza Vaccine (Quad) PF 10/20/2016, 10/26/2017    Pneumococcal Vaccine (Unspecified Type) 01/01/2013     No LMP for male patient. Allergies and Intolerances: Allergies   Allergen Reactions    Lasix [Furosemide] Other (comments)    Levofloxacin Rash    Penicillins Swelling       Family History:   Family History   Problem Relation Age of Onset    Heart Disease Mother     Heart Disease Father        Social History:   He  reports that he quit smoking about 2 years ago. His smoking use included Cigarettes.  He has a 45.00 pack-year smoking history. He has never used smokeless tobacco.  He  reports that he does not drink alcohol. Review of Systems:   General: negative for - chills, fatigue, fever, weight change  Psych: negative for - anxiety, depression, irritability or mood swings  ENT: negative for - headaches, hearing change, nasal congestion, oral lesions, sneezing or sore throat  Heme/ Lymph: negative for - bleeding problems, bruising, pallor or swollen lymph nodes  Endo: negative for - hot flashes, polydipsia/polyuria or temperature intolerance  Resp: negative for - cough, shortness of breath or wheezing  CV: negative for - chest pain, edema or palpitations  GI: negative for - abdominal pain, change in bowel habits, constipation, diarrhea or nausea/vomiting  : negative for - dysuria, hematuria, incontinence, pelvic pain or vulvar/vaginal symptoms  MSK: negative for - joint pain, joint swelling or muscle pain  Neuro: negative for - confusion, headaches, seizures or weakness  Derm: negative for - dry skin, hair changes, rash or skin lesion changes          Physical:   Vitals:   Vitals:    04/10/18 1041 04/10/18 1118   BP: 137/82 136/84   Pulse: 72    Resp: 14    Temp: 97.2 °F (36.2 °C)    TempSrc: Oral    SpO2: 97%    Weight: 234 lb (106.1 kg)    Height: 5' 11\" (1.803 m)            Exam:   HEENT- atraumatic,normocephalic, awake, oriented, well nourished  Neck - supple,no enlarged lymph nodes, no JVD, no thyromegaly  Chest- CTA, no rhonchi, no crackles  Heart- rrr, no murmurs / gallop/rub  Abdomen- soft,BS+,NT, no hepatosplenomegaly  Ext - no c/c/edema   Neuro- no focal deficits. Power 5/5 all extremities  Skin - warm,dry, no obvious rashes.           Review of Data:   LABS:   Lab Results   Component Value Date/Time    WBC 6.4 10/26/2017 11:35 AM    HGB 14.3 10/26/2017 11:35 AM    HCT 44.2 10/26/2017 11:35 AM    PLATELET 404 74/85/2768 11:35 AM     Lab Results   Component Value Date/Time    Sodium 143 10/26/2017 11:35 AM Potassium 3.8 10/26/2017 11:35 AM    Chloride 102 10/26/2017 11:35 AM    CO2 25 10/26/2017 11:35 AM    Glucose 106 (H) 10/26/2017 11:35 AM    BUN 22 10/26/2017 11:35 AM    Creatinine 1.18 10/26/2017 11:35 AM     Lab Results   Component Value Date/Time    Cholesterol, total 170 10/26/2017 11:35 AM    HDL Cholesterol 57 10/26/2017 11:35 AM    LDL, calculated 89 10/26/2017 11:35 AM    Triglyceride 120 10/26/2017 11:35 AM     No results found for: GPT        Impression / Plan:        ICD-10-CM ICD-9-CM    1. Essential hypertension I10 401.9 hydrALAZINE (APRESOLINE) 25 mg tablet     Low salt diet, labs reviewed. DM -stable    Chol - well controlled      Pt wants to restart Hydralazine. Anti histone ab - neg      Explained to patient risk benefits of the medications. Advised patient to stop meds if having any side effects. Pt verbalized understanding of the instructions. I have discussed the diagnosis with the patient and the intended plan as seen in the above orders. The patient has received an after-visit summary and questions were answered concerning future plans. I have discussed medication side effects and warnings with the patient as well. I have reviewed the plan of care with the patient, accepted their input and they are in agreement with the treatment goals. Reviewed plan of care. Patient has provided input and agrees with goals.     Follow-up Disposition: Not on Lissette Ferreira MD

## 2018-04-11 ENCOUNTER — OFFICE VISIT (OUTPATIENT)
Dept: CARDIOLOGY CLINIC | Age: 63
End: 2018-04-11

## 2018-04-11 DIAGNOSIS — I50.42 CHRONIC COMBINED SYSTOLIC AND DIASTOLIC HEART FAILURE (HCC): Primary | ICD-10-CM

## 2018-04-11 DIAGNOSIS — Z95.810 AICD (AUTOMATIC CARDIOVERTER/DEFIBRILLATOR) PRESENT: ICD-10-CM

## 2018-04-18 NOTE — PROGRESS NOTES
I have personally seen and evaluated the device findings. Interrogation reviewed and I agree with assessment.     Jose Armando Marks

## 2018-04-25 NOTE — MR AVS SNAPSHOT
10S OT Note: Patient was not available for their therapy session at this time.  Reason not seen: Other (comment) (pt having cardiac cath) (04/25/18 1257).  Re-Attempt Plan: Will re-attempt per established treatment plan (04/25/18 1257).     Visit Information Date & Time Provider Department Dept. Phone Encounter #  
 2/1/2017 10:45 AM [id number], 5501 [facility] [phone] [id number] Follow-up Instructions Return in about 2 months (around 4/1/2017) for Medicare wwellness. Follow-up and Disposition History Your Appointments 2/24/2017  1:30 PM  
Follow Up with MD Tata Scales [facility] at [facility]) Appt Note: 6 m f/u; pt r/s- had an appt on original date -[facility] [address] Count includes the [facility] [address] [place] [address]  
  
    
 4/4/2017 10:30 AM  
Follow Up with Candice Jackson NP [address] ([address]) Appt Note: Per [place] Morale R/S due to other medical issues/ Colon screening [address] [facility] [address] [facility] 88 710 [facility] 95  
  
    
 4/26/2017  3:40 PM  
CARELINK with Roldan Holman [facility] ([address]) Appt Note: 3 month [facility] [address] [address] [phone] [place] [phone]  
  
    
 5/9/2017 11:00 AM  
Follow Up with Silke Infante MD  
Cardio Specialist at [address]) Appt Note: 6 m f/u after [facility] [address] [place] [address] Upcoming Health Maintenance Date Due Hepatitis C Screening 1955 EYE EXAM RETINAL OR DILATED Q1 1/29/1964 DTaP/Tdap/Td series (1 - Tdap) 1/29/1975 FOOT EXAM Q1 2/10/2017 MICROALBUMIN Q1 2/10/2017 MEDICARE YEARLY EXAM 2/10/2017 HEMOGLOBIN A1C Q6M 3/23/2017 LIPID PANEL Q1 9/23/2017 COLONOSCOPY 2/19/2025 Allergies as of 2/1/2017  Review Complete On: 2/1/2017 By: Pop Nguyen MD  
  
 Severity Noted Reaction Type Reactions Lasix [Furosemide]  05/16/2016    Other (comments) Levofloxacin  05/25/2016    Rash Penicillins  11/28/2015    Swelling Current Immunizations  Reviewed on 10/20/2016 Name Date Influenza Vaccine (Quad) PF 10/20/2016 Pneumococcal Vaccine (Unspecified Type) 1/1/2013 Not reviewed this visit You Were Diagnosed With   
  
 Codes Comments Deep vein thrombosis (DVT) of other vein of left upper extremity ([facility])    -  Primary ICD-10-CM: M89.431 Type 2 diabetes mellitus with diabetic neuropathy, with long-term current use of insulin ([facility])     ICD-10-CM: E11.40, Z79.4 ICD-9-CM: 250.60, 357.2, V58.67 PAD (peripheral artery disease) ([facility])     ICD-10-CM: I73.9 ICD-9-CM: 443.9 Essential hypertension     ICD-10-CM: I10 
ICD-9-CM: 401.9 Vitals BP Pulse Temp Resp Height(growth percentile) Weight(growth percentile) 125/75 (BP 1 Location: Left arm, BP Patient Position: Sitting) 74 97.4 °F (36.3 °C) (Oral) 18 5' 11\" (1.803 m) 230 lb (104.3 kg) SpO2 BMI Smoking Status 94% 32.08 kg/m2 Former Smoker Vitals History BMI and BSA Data Body Mass Index Body Surface Area 32.08 kg/m 2 2.29 m 2 Preferred Pharmacy Pharmacy Name Phone [facility] - [place], [address] [phone] Your Updated Medication List  
  
   
This list is accurate as of: 2/1/17 11:50 AM.  Always use your most recent med list.  
  
  
  
  
 acetaminophen 500 mg tablet Commonly known as:  [address] Take 2 Tabs by mouth every six (6) hours as needed for Pain. albuterol 90 mcg/actuation inhaler Commonly known as:  PROVENTIL HFA, VENTOLIN HFA, PROAIR HFA Take 2 Puffs by inhalation every six (6) hours as needed for Wheezing. aspirin 81 mg chewable tablet Take 1 Tab by mouth daily. Indications: MYOCARDIAL INFARCTION PREVENTION  
  
 atorvastatin 80 mg tablet Commonly known as:  LIPITOR Take 1 Tab by mouth nightly. carvedilol 25 mg tablet Commonly known as:  [place]lin [place] Take 12.5 mg by mouth two (2) times daily (with meals). 1/2 tab BID  
  
 gabapentin 300 mg capsule Commonly known as:  NEURONTIN Take 1 Cap by mouth two (2) times a day. hydrALAZINE 25 mg tablet Commonly known as:  APRESOLINE Take 1 Tab by mouth three (3) times daily. hydroCHLOROthiazide 25 mg tablet Commonly known as:  HYDRODIURIL Take 1 Tab by mouth daily. LANTUS 100 unit/mL injection Generic drug:  insulin glargine 35 Units by SubCUTAneous route nightly. pt takes 30 Units  
  
 losartan 100 mg tablet Commonly known as:  COZAAR Take 1 Tab by mouth daily. mometasone 50 mcg/actuation nasal spray Commonly known as:  NASONEX  
2 Sprays by Both Nostrils route daily. NIFEdipine ER 60 mg ER tablet Commonly known as:  ADALAT CC Take 1 Tab by mouth daily. nitroglycerin 0.4 mg SL tablet Commonly known as:  NITROSTAT  
1 Tab by SubLINGual route every five (5) minutes as needed for Chest Pain.  
  
 potassium chloride 10 mEq tablet Commonly known as:  K-DUR, KLOR-CON Take 1 Tab by mouth daily. PROTONIX 40 mg tablet Generic drug:  pantoprazole Take 40 mg by mouth daily. rivaroxaban 20 mg Tab tablet Commonly known as:  Graydon Primus Take 1 Tab by mouth daily (with breakfast). traMADol 50 mg tablet Commonly known as:  ULTRAM  
Take 1 Tab by mouth every eight (8) hours as needed for Pain. Max Daily Amount: 150 mg. Follow-up Instructions Return in about 2 months (around 4/1/2017) for Medicare wwellness. Introducing [facility] & HEALTH SERVICES! Dear Karen Freitas: Thank you for requesting a [employer]rol[employer] account. Our records indicate that you already have an active Metrolight account.   You can access your account anytime at [email or web address] Did you know that you can access your hospital and ER discharge instructions at any time in Pathable? You can also review all of your test results from your hospital stay or ER visit. Additional Information If you have questions, please visit the Frequently Asked Questions section of the Pathable website at [email or web address] Remember, Pathable is NOT to be used for urgent needs. For medical emergencies, dial 911. Now available from your iPhone and Android! Please provide this summary of care documentation to your next provider. Your primary care clinician is listed as Zack Montelongo. If you have any questions after today's visit, please call [phone].

## 2018-05-24 ENCOUNTER — TELEPHONE (OUTPATIENT)
Dept: FAMILY MEDICINE CLINIC | Age: 63
End: 2018-05-24

## 2018-05-24 NOTE — TELEPHONE ENCOUNTER
Patient called provider on call last night regarding blood pressure. Patient was told to call again today to let Aisha Wadsworth know how he is doing. Patient stated he is doing very well today.

## 2018-07-05 ENCOUNTER — OFFICE VISIT (OUTPATIENT)
Dept: FAMILY MEDICINE CLINIC | Age: 63
End: 2018-07-05

## 2018-07-05 VITALS
TEMPERATURE: 97 F | HEART RATE: 76 BPM | OXYGEN SATURATION: 98 % | SYSTOLIC BLOOD PRESSURE: 140 MMHG | DIASTOLIC BLOOD PRESSURE: 83 MMHG | HEIGHT: 71 IN | RESPIRATION RATE: 18 BRPM | BODY MASS INDEX: 31.22 KG/M2 | WEIGHT: 223 LBS

## 2018-07-05 DIAGNOSIS — E11.9 DM TYPE 2, GOAL HBA1C < 7% (HCC): Primary | ICD-10-CM

## 2018-07-05 DIAGNOSIS — I10 ESSENTIAL HYPERTENSION: ICD-10-CM

## 2018-07-05 DIAGNOSIS — M25.512 LEFT SHOULDER PAIN, UNSPECIFIED CHRONICITY: ICD-10-CM

## 2018-07-05 DIAGNOSIS — I73.9 PAD (PERIPHERAL ARTERY DISEASE) (HCC): ICD-10-CM

## 2018-07-05 DIAGNOSIS — K21.9 GASTROESOPHAGEAL REFLUX DISEASE WITHOUT ESOPHAGITIS: ICD-10-CM

## 2018-07-05 RX ORDER — ATORVASTATIN CALCIUM 80 MG/1
80 TABLET, FILM COATED ORAL
Qty: 30 TAB | Refills: 5 | Status: SHIPPED | OUTPATIENT
Start: 2018-07-05 | End: 2021-12-03 | Stop reason: SDUPTHER

## 2018-07-05 RX ORDER — HYDRALAZINE HYDROCHLORIDE 25 MG/1
25 TABLET, FILM COATED ORAL 3 TIMES DAILY
Qty: 90 TAB | Refills: 3 | Status: SHIPPED | OUTPATIENT
Start: 2018-07-05 | End: 2021-08-25 | Stop reason: ALTCHOICE

## 2018-07-05 RX ORDER — NIFEDIPINE 60 MG/1
60 TABLET, EXTENDED RELEASE ORAL DAILY
Qty: 90 TAB | Refills: 3 | Status: SHIPPED | OUTPATIENT
Start: 2018-07-05 | End: 2021-08-25 | Stop reason: ALTCHOICE

## 2018-07-05 RX ORDER — LOSARTAN POTASSIUM 100 MG/1
100 TABLET ORAL DAILY
Qty: 90 TAB | Refills: 3 | Status: SHIPPED | OUTPATIENT
Start: 2018-07-05 | End: 2021-04-01

## 2018-07-05 RX ORDER — PANTOPRAZOLE SODIUM 40 MG/1
40 TABLET, DELAYED RELEASE ORAL DAILY
Qty: 30 TAB | Refills: 1 | Status: SHIPPED | OUTPATIENT
Start: 2018-07-05 | End: 2021-08-25 | Stop reason: ALTCHOICE

## 2018-07-05 RX ORDER — CARVEDILOL 25 MG/1
12.5 TABLET ORAL 2 TIMES DAILY WITH MEALS
Qty: 30 TAB | Refills: 3 | Status: SHIPPED | OUTPATIENT
Start: 2018-07-05 | End: 2021-08-25 | Stop reason: ALTCHOICE

## 2018-07-05 RX ORDER — HYDROCHLOROTHIAZIDE 25 MG/1
25 TABLET ORAL DAILY
Qty: 90 TAB | Refills: 3 | Status: SHIPPED | OUTPATIENT
Start: 2018-07-05 | End: 2021-08-25 | Stop reason: ALTCHOICE

## 2018-07-05 NOTE — MR AVS SNAPSHOT
[address] [facility] 
 
 
 [address] [facility] [address] [facility] 83 [address] [phone] Patient: Arvind Yusuf MRN: [id number] CXL:7/05/1981 Visit Information Date & Time Provider Department Dept. Phone Encounter #  
 7/5/2018  1:00 PM [id number] Lorri Weinberg, 5505 [facility] [phone] Follow-up Instructions Return in about 3 months (around 10/5/2018). Your Appointments 7/5/2018  1:00 PM  
Follow Up with Manolo Weinberg MD  
[facility]) Appt Note: F/U FOR DIABETES AND BLOOD PRESSURE; Pt. confirmed appt. 07/03/18 4:10pm [facility]  
 [address] [facility] [address]  
  
    
 7/25/2018  9:30 AM  
PROCEDURE with Roldan Lambert [facility] Cardio Specialist at [facility]) Appt Note: 1 year BiV AICD check [employer] [place] [address] [address] [place] [address]  
  
    
 12/13/2018 11:30 AM  
Follow Up with Luís Summers MD  
Cardio Specialist at [facility]) Appt Note: 9 months follow up  
 [place] [address] [address] [place] [address] Upcoming Health Maintenance Date Due  
 EYE EXAM RETINAL OR DILATED Q1 1/29/1964 Influenza Age 5 to Adult 8/1/2018 HEMOGLOBIN A1C Q6M 10/5/2018 MICROALBUMIN Q1 4/5/2019 LIPID PANEL Q1 4/5/2019 MEDICARE YEARLY EXAM 4/6/2019 FOOT EXAM Q1 7/5/2019 COLONOSCOPY 12/14/2022 DTaP/Tdap/Td series (2 - Td) 4/4/2027 Allergies as of 7/5/2018  Review Complete On: 4/10/2018 By: Arabella Chamorro LPN Severity Noted Reaction Type Reactions Lasix [Furosemide]  05/16/2016    Other (comments) Levofloxacin  05/25/2016    Rash Penicillins  11/28/2015    Swelling Current Immunizations  Reviewed on 10/20/2016 Name Date Influenza Vaccine (Quad) PF 10/26/2017, 10/20/2016 Pneumococcal Vaccine (Unspecified Type) 1/1/2013 Not reviewed this visit You Were Diagnosed With   
  
 Codes Comments DM type 2, goal HbA1c < 7% ([facility])    -  Primary ICD-10-CM: E11.9 ICD-9-CM: 250.00 Essential hypertension     ICD-10-CM: I10 
ICD-9-CM: 401.9 Gastroesophageal reflux disease without esophagitis     ICD-10-CM: K21.9 ICD-9-CM: 530.81 Left shoulder pain, unspecified chronicity     ICD-10-CM: M25.512 ICD-9-CM: 719.41 PAD (peripheral artery disease) ([facility])     ICD-10-CM: I73.9 ICD-9-CM: 443. 9 Vitals BP Pulse Temp Resp Height(growth percentile) Weight(growth percentile) 140/83 76 97 °F (36.1 °C) (Oral) 18 5' 11\" (1.803 m) 223 lb (101.2 kg) SpO2 BMI Smoking Status 98% 31.1 kg/m2 Former Smoker Vitals History BMI and BSA Data Body Mass Index Body Surface Area  
 31.1 kg/m 2 2.25 m 2 Preferred Pharmacy Pharmacy Name Phone [facility] - [address], [address] [phone] Your Updated Medication List  
  
   
This list is accurate as of 7/5/18 11:10 AM.  Always use your most recent med list.  
  
  
  
  
 acetaminophen 500 mg tablet Commonly known as:  [address] Take 2 Tabs by mouth every six (6) hours as needed for Pain. albuterol 90 mcg/actuation inhaler Commonly known as:  PROVENTIL HFA, VENTOLIN HFA, PROAIR HFA Take 2 Puffs by inhalation every six (6) hours as needed for Wheezing. aspirin 81 mg chewable tablet Take 1 Tab by mouth daily. Indications: MYOCARDIAL INFARCTION PREVENTION  
  
 atorvastatin 80 mg tablet Commonly known as:  LIPITOR Take 1 Tab by mouth nightly. budesonide-formoterol 160-4.5 mcg/actuation Hfaa Commonly known as:  SYMBICORT Take 2 Puffs by inhalation two (2) times a day. carvedilol 25 mg tablet Commonly known as:  Nevaehsandra MenaEm[place] Take 0.5 Tabs by mouth two (2) times daily (with meals). 1/2 tab BID  
  
 cholecalciferol 50,000 unit capsule Commonly known as:  VITAMIN D3 Take  by mouth. hydrALAZINE 25 mg tablet Commonly known as:  APRESOLINE Take 1 Tab by mouth three (3) times daily. hydroCHLOROthiazide 25 mg tablet Commonly known as:  HYDRODIURIL Take 1 Tab by mouth daily. LANTUS U-100 INSULIN 100 unit/mL injection Generic drug:  insulin glargine 35 Units by SubCUTAneous route nightly. pt takes 30 Units  
  
 losartan 100 mg tablet Commonly known as:  COZAAR Take 1 Tab by mouth daily. mometasone 50 mcg/actuation nasal spray Commonly known as:  NASONEX  
2 Sprays by Both Nostrils route daily. NIFEdipine ER 60 mg ER tablet Commonly known as:  ADALAT CC Take 1 Tab by mouth daily. NITROSTAT 0.4 mg SL tablet Generic drug:  nitroglycerin  
by SubLINGual route every five (5) minutes as needed for Chest Pain.  
  
 pantoprazole 40 mg tablet Commonly known as:  PROTONIX Take 1 Tab by mouth daily. terbinafine HCl 250 mg tablet Commonly known as:  LAMISIL Take 1 Tab by mouth daily. Prescriptions Sent to Pharmacy Refills  
 hydrALAZINE (APRESOLINE) 25 mg tablet 3 Sig: Take 1 Tab by mouth three (3) times daily. Class: Normal  
 Pharmacy: [address] [place] [address], [address] Ph #: [phone] Route: Oral  
 pantoprazole (PROTONIX) 40 mg tablet 1 Sig: Take 1 Tab by mouth daily. Class: Normal  
 Pharmacy: [address] [place] [address], [address] [employer] Ph #: [phone] Route: Oral  
 losartan (COZAAR) 100 mg tablet 3 Sig: Take 1 Tab by mouth daily. Class: Normal  
 Pharmacy: [address] [place] [address], [address] [employer] Ph #: [phone] Route: Oral  
 hydroCHLOROthiazide (HYDRODIURIL) 25 mg tablet 3 Sig: Take 1 Tab by mouth daily.   
 Class: Normal  
 Pharmacy: [facility], [employer] Ph #: [phone] Route: Oral  
 atorvastatin (LIPITOR) 80 mg tablet 5 Sig: Take 1 Tab by mouth nightly. Class: Normal  
 Pharmacy: [facility] [employer] Ph #: [phone] Route: Oral  
 carvedilol (COREG) 25 mg tablet 3 Sig: Take 0.5 Tabs by mouth two (2) times daily (with meals). 1/2 tab BID Class: Normal  
 Pharmacy: [facility] [employer] Ph #: [phone] Route: Oral  
 NIFEdipine ER (ADALAT CC) 60 mg ER tablet 3 Sig: Take 1 Tab by mouth daily. Class: Normal  
 Pharmacy: [facility] [employer] Ph #: [phone] Route: Oral  
  
Follow-up Instructions Return in about 3 months (around 10/5/2018). To-Do List   
 07/05/2018 Imaging:  XR SHOULDER LT AP/LAT MIN 2 V   
  
 07/11/2018 Vascular/US:  LOWER EXT ART PVR MULT LEVEL SEG PRESSURES Introducing [facility] & HEALTH SERVICES! Dear Berkley Knowles: Thank you for requesting a dough account. Our records indicate that you already have an active dough account. You can access your account anytime at [email or web address] Did you know that you can access your hospital and ER discharge instructions at any time in dough? You can also review all of your test results from your hospital stay or ER visit. Additional Information If you have questions, please visit the Frequently Asked Questions section of the dough website at [email or web address] Remember, dough is NOT to be used for urgent needs. For medical emergencies, dial 911. Now available from your iPhone and Android! Please provide this summary of care documentation to your next provider. Your primary care clinician is listed as Raymundo Guidry. If you have any questions after today's visit, please call [phone].

## 2018-07-05 NOTE — PROGRESS NOTES
Charito Stewart is a 58 y.o.  male and presents with     Chief Complaint   Patient presents with    Shoulder Pain    Diabetes    Hypertension    Cholesterol Problem       Pt is taking meds for DM, HTN, hyperchol as directed. Pt complains of left shoulder pain when he raises it. Pt saw Dr Liu Gomez podiatry . She wants pt to see Dr Diego Maravilla. Pt has h/p PAD. Past Medical History:   Diagnosis Date    Biventricular ICD (implantable cardioverter-defibrillator) in place 07/16    Medtronic    CAD (coronary artery disease)     Cardiomyopathy, ischemic     EF 30% (04/16) 30-35% (11/15)    Diabetes [facility]     DVT (deep venous thrombosis) ([facility] 08/16    L axillary vein after PPM insertion    HLD (hyperlipidemia)     Hypertension     NSTEMI (non-ST elevated myocardial infarction) ([facility]     S/P OM1 2.5 X 23 mm XIENCE (11/15)    Pulmonary emphysema ([facility]     Stroke [facility])      Past Surgical History:   Procedure Laterality Date    COLONOSCOPY N/A 12/14/2017    COLONOSCOPY performed by Delmer Iyer MD at [address] [place] [address] HX PACEMAKER PLACEMENT      VASCULAR SURGERY PROCEDURE UNLIST      Stent placed right thigh     Current Outpatient Prescriptions   Medication Sig    hydrALAZINE (APRESOLINE) 25 mg tablet Take 1 Tab by mouth three (3) times daily.  pantoprazole (PROTONIX) 40 mg tablet Take 1 Tab by mouth daily.  losartan (COZAAR) 100 mg tablet Take 1 Tab by mouth daily.  hydroCHLOROthiazide (HYDRODIURIL) 25 mg tablet Take 1 Tab by mouth daily.  atorvastatin (LIPITOR) 80 mg tablet Take 1 Tab by mouth nightly.  carvedilol (COREG) 25 mg tablet Take 0.5 Tabs by mouth two (2) times daily (with meals). 1/2 tab BID    NIFEdipine ER (ADALAT CC) 60 mg ER tablet Take 1 Tab by mouth daily.  Cholecalciferol, Vitamin D3, 50,000 unit cap Take  by mouth.  acetaminophen (TYLENOL EXTRA STRENGTH) 500 mg tablet Take 2 Tabs by mouth every six (6) hours as needed for Pain.     aspirin 81 mg chewable tablet Take 1 Tab by mouth daily. Indications: MYOCARDIAL INFARCTION PREVENTION    insulin glargine (LANTUS) 100 unit/mL injection 35 Units by SubCUTAneous route nightly. pt takes 30 Units     nitroglycerin (NITROSTAT) 0.4 mg SL tablet by SubLINGual route every five (5) minutes as needed for Chest Pain.  budesonide-formoterol (SYMBICORT) 160-4.5 mcg/actuation HFAA Take 2 Puffs by inhalation two (2) times a day.  terbinafine HCl (LAMISIL) 250 mg tablet Take 1 Tab by mouth daily.  albuterol (PROVENTIL HFA, VENTOLIN HFA, PROAIR HFA) 90 mcg/actuation inhaler Take 2 Puffs by inhalation every six (6) hours as needed for Wheezing.  mometasone (NASONEX) 50 mcg/actuation nasal spray 2 Sprays by Both Nostrils route daily. No current facility-administered medications for this visit. Health Maintenance   Topic Date Due    EYE EXAM RETINAL OR DILATED Q1  01/29/1964    Influenza Age 5 to Adult  08/01/2018    HEMOGLOBIN A1C Q6M  10/05/2018    MICROALBUMIN Q1  04/05/2019    LIPID PANEL Q1  04/05/2019    MEDICARE YEARLY EXAM  04/06/2019    FOOT EXAM Q1  07/05/2019    COLONOSCOPY  12/14/2022    DTaP/Tdap/Td series (2 - Td) 04/04/2027    Hepatitis C Screening  Completed    ZOSTER VACCINE AGE 60>  Completed    Pneumococcal 19-64 Medium Risk  Completed     Immunization History   Administered Date(s) Administered    Influenza Vaccine (Quad) PF 10/20/2016, 10/26/2017    Pneumococcal Vaccine (Unspecified Type) 01/01/2013     No LMP for male patient. Allergies and Intolerances: Allergies   Allergen Reactions    Lasix [Furosemide] Other (comments)    Levofloxacin Rash    Penicillins Swelling       Family History:   Family History   Problem Relation Age of Onset    Heart Disease Mother     Heart Disease Father        Social History:   He  reports that he quit smoking about 2 years ago. His smoking use included Cigarettes. He has a 45.00 pack-year smoking history.  He has never used smokeless tobacco. He  reports that he does not drink alcohol. Review of Systems:   General: negative for - chills, fatigue, fever, weight change  Psych: negative for - anxiety, depression, irritability or mood swings  ENT: negative for - headaches, hearing change, nasal congestion, oral lesions, sneezing or sore throat  Heme/ Lymph: negative for - bleeding problems, bruising, pallor or swollen lymph nodes  Endo: negative for - hot flashes, polydipsia/polyuria or temperature intolerance  Resp: negative for - cough, shortness of breath or wheezing  CV: negative for - chest pain, edema or palpitations  GI: negative for - abdominal pain, change in bowel habits, constipation, diarrhea or nausea/vomiting  : negative for - dysuria, hematuria, incontinence, pelvic pain or vulvar/vaginal symptoms  MSK: negative for - joint pain, joint swelling or muscle pain  Neuro: negative for - confusion, headaches, seizures or weakness  Derm: negative for - dry skin, hair changes, rash or skin lesion changes          Physical:   Vitals:   Vitals:    07/05/18 1049 07/05/18 1054   BP: 147/86 140/83   Pulse: 76    Resp: 18    Temp: 97 °F (36.1 °C)    TempSrc: Oral    SpO2: 98%    Weight: 223 lb (101.2 kg)    Height: 5' 11\" (1.803 m)            Exam:   HEENT- atraumatic,normocephalic, awake, oriented, well nourished  Neck - supple,no enlarged lymph nodes, no JVD, no thyromegaly  Chest- CTA, no rhonchi, no crackles  Heart- rrr, no murmurs / gallop/rub  Abdomen- soft,BS+,NT, no hepatosplenomegaly  Ext - no c/c/edema , DP , PT tibial absent bilateral, diminished ROM at left shoulder   Neuro- no focal deficits. Power 5/5 all extremities  Skin - warm,dry, no obvious rashes.           Review of Data:   LABS:   Lab Results   Component Value Date/Time    WBC 6.4 10/26/2017 11:35 AM    HGB 14.3 10/26/2017 11:35 AM    HCT 44.2 10/26/2017 11:35 AM    PLATELET 265 84/30/3707 11:35 AM     Lab Results   Component Value Date/Time    Sodium 139 04/05/2018 10:35 AM    Potassium 3.7 04/05/2018 10:35 AM    Chloride 101 04/05/2018 10:35 AM    CO2 23 04/05/2018 10:35 AM    Glucose 94 04/05/2018 10:35 AM    BUN 24 04/05/2018 10:35 AM    Creatinine 1.24 04/05/2018 10:35 AM     Lab Results   Component Value Date/Time    Cholesterol, total 157 04/05/2018 10:35 AM    HDL Cholesterol 61 04/05/2018 10:35 AM    LDL, calculated 76 04/05/2018 10:35 AM    Triglyceride 98 04/05/2018 10:35 AM     No results found for: GPT        Impression / Plan:        ICD-10-CM ICD-9-CM    1. DM type 2, goal HbA1c < 7% ([facility]) E11.9 250.00    2. Essential hypertension I10 401.9 hydrALAZINE (APRESOLINE) 25 mg tablet      losartan (COZAAR) 100 mg tablet      hydroCHLOROthiazide (HYDRODIURIL) 25 mg tablet      atorvastatin (LIPITOR) 80 mg tablet      carvedilol (COREG) 25 mg tablet      NIFEdipine ER (ADALAT CC) 60 mg ER tablet   3. Gastroesophageal reflux disease without esophagitis K21.9 530.81 pantoprazole (PROTONIX) 40 mg tablet   4. Left shoulder pain, unspecified chronicity M25.512 719.41 XR SHOULDER LT AP/LAT MIN 2 V   5. PAD (peripheral artery disease) ([facility]) I73.9 443.9 LOWER EXT ART PVR MULT LEVEL SEG PRESSURES     DM/HTN/Hypercol - stable      Explained to patient risk benefits of the medications. Advised patient to stop meds if having any side effects. Pt verbalized understanding of the instructions. I have discussed the diagnosis with the patient and the intended plan as seen in the above orders. The patient has received an after-visit summary and questions were answered concerning future plans. I have discussed medication side effects and warnings with the patient as well. I have reviewed the plan of care with the patient, accepted their input and they are in agreement with the treatment goals. Reviewed plan of care. Patient has provided input and agrees with goals.     Follow-up Disposition: Not on Kiersten Renee MD

## 2018-07-16 ENCOUNTER — HOSPITAL ENCOUNTER (OUTPATIENT)
Dept: GENERAL RADIOLOGY | Age: 63
Discharge: HOME | End: 2018-07-16
Attending: INTERNAL MEDICINE
Payer: MEDICARE

## 2018-07-16 ENCOUNTER — HOSPITAL ENCOUNTER (OUTPATIENT)
Dept: VASCULAR SURGERY | Age: 63
Discharge: HOME | End: 2018-07-16
Attending: INTERNAL MEDICINE
Payer: MEDICARE

## 2018-07-16 DIAGNOSIS — M25.512 LEFT SHOULDER PAIN, UNSPECIFIED CHRONICITY: ICD-10-CM

## 2018-07-16 DIAGNOSIS — I73.9 PAD (PERIPHERAL ARTERY DISEASE) (HCC): ICD-10-CM

## 2018-07-16 PROCEDURE — 73030 X-RAY EXAM OF SHOULDER: CPT

## 2018-07-16 PROCEDURE — 93923 UPR/LXTR ART STDY 3+ LVLS: CPT

## 2018-07-17 LAB
LEFT ABI: 0.94
LEFT ANTERIOR TIBIAL: 138 MMHG
LEFT ARM BP: 147 MMHG
LEFT ATA BP LEVEL: NORMAL
LEFT CALF PRESSURE: 147 MMHG
LEFT LOW THIGH PRESSURE: 165 MMHG
LEFT POSTERIOR TIBIAL: 124 MMHG
RIGHT ABI: 0.88
RIGHT ANTERIOR TIBIAL: 101 MMHG
RIGHT ARM BP: 144 MMHG
RIGHT ATA BP LEVEL: NORMAL
RIGHT CALF PRESSURE: 137 MMHG
RIGHT LOW THIGH PRESSURE: 145 MMHG
RIGHT POSTERIOR TIBIAL: 129 MMHG

## 2018-07-27 ENCOUNTER — TELEPHONE (OUTPATIENT)
Dept: FAMILY MEDICINE CLINIC | Age: 63
End: 2018-07-27

## 2018-07-27 NOTE — TELEPHONE ENCOUNTER
Patient called in requesting referral to another Vein specialist. States he does not feel comfortable with his current provider

## 2018-10-05 ENCOUNTER — OFFICE VISIT (OUTPATIENT)
Dept: FAMILY MEDICINE CLINIC | Age: 63
End: 2018-10-05

## 2018-10-05 VITALS
RESPIRATION RATE: 16 BRPM | OXYGEN SATURATION: 95 % | SYSTOLIC BLOOD PRESSURE: 131 MMHG | BODY MASS INDEX: 31.5 KG/M2 | TEMPERATURE: 98.2 F | WEIGHT: 225 LBS | HEART RATE: 74 BPM | HEIGHT: 71 IN | DIASTOLIC BLOOD PRESSURE: 83 MMHG

## 2018-10-05 DIAGNOSIS — M75.02 ADHESIVE CAPSULITIS OF LEFT SHOULDER: ICD-10-CM

## 2018-10-05 DIAGNOSIS — E78.00 HYPERCHOLESTEREMIA: ICD-10-CM

## 2018-10-05 DIAGNOSIS — I10 ESSENTIAL HYPERTENSION: ICD-10-CM

## 2018-10-05 DIAGNOSIS — I73.9 PAD (PERIPHERAL ARTERY DISEASE) (HCC): ICD-10-CM

## 2018-10-05 DIAGNOSIS — Z79.4 TYPE 2 DIABETES MELLITUS WITH DIABETIC NEUROPATHY, WITH LONG-TERM CURRENT USE OF INSULIN (HCC): Primary | ICD-10-CM

## 2018-10-05 DIAGNOSIS — I50.42 CHRONIC COMBINED SYSTOLIC AND DIASTOLIC HEART FAILURE (HCC): ICD-10-CM

## 2018-10-05 DIAGNOSIS — E11.40 TYPE 2 DIABETES MELLITUS WITH DIABETIC NEUROPATHY, WITH LONG-TERM CURRENT USE OF INSULIN (HCC): Primary | ICD-10-CM

## 2018-10-05 NOTE — PROGRESS NOTES
Kris Liriano is a 58 y.o.  male and presents with     Chief Complaint   Patient presents with    Shoulder Pain    Diabetes    Cholesterol Problem    Hypertension    Coronary Artery Disease    Leg Pain       Pt has left shoulder pain. He is not able to raise his left shoulder. Pt denies chest pain. Pt saw Podiatry for leg pain. Pt  Was told by Podiatry who told him that he needs to see vascular. Pt says his blood sugars are doing good. He is taking HTN and chol       Past Medical History:   Diagnosis Date    Biventricular ICD (implantable cardioverter-defibrillator) in place 07/16    Medtronic    CAD (coronary artery disease)     Cardiomyopathy, ischemic     EF 30% (04/16) 30-35% (11/15)    [facility]     DVT (deep venous thrombosis) ([facility] 08/16    L axillary vein after PPM insertion    HLD (hyperlipidemia)     Hypertension     NSTEMI (non-ST elevated myocardial infarction) ([facility]     S/P OM1 2.5 X 23 mm XIENCE (11/15)    Pulmonary emphysema ([facility]     Stroke [facility])      Past Surgical History:   Procedure Laterality Date    COLONOSCOPY N/A 12/14/2017    COLONOSCOPY performed by Ava Faith MD at [address] HX PACEMAKER PLACEMENT      VASCULAR SURGERY PROCEDURE UNLIST      Stent placed right thigh     Current Outpatient Prescriptions   Medication Sig    hydrALAZINE (APRESOLINE) 25 mg tablet Take 1 Tab by mouth three (3) times daily.  pantoprazole (PROTONIX) 40 mg tablet Take 1 Tab by mouth daily.  losartan (COZAAR) 100 mg tablet Take 1 Tab by mouth daily.  hydroCHLOROthiazide (HYDRODIURIL) 25 mg tablet Take 1 Tab by mouth daily.  atorvastatin (LIPITOR) 80 mg tablet Take 1 Tab by mouth nightly.  carvedilol (COREG) 25 mg tablet Take 0.5 Tabs by mouth two (2) times daily (with meals). 1/2 tab BID    NIFEdipine ER (ADALAT CC) 60 mg ER tablet Take 1 Tab by mouth daily.     nitroglycerin (NITROSTAT) 0.4 mg SL tablet by SubLINGual route every five (5) minutes as needed for Chest Pain.  budesonide-formoterol (SYMBICORT) 160-4.5 mcg/actuation HFAA Take 2 Puffs by inhalation two (2) times a day.  Cholecalciferol, Vitamin D3, 50,000 unit cap Take  by mouth.  aspirin 81 mg chewable tablet Take 1 Tab by mouth daily. Indications: MYOCARDIAL INFARCTION PREVENTION    insulin glargine (LANTUS) 100 unit/mL injection 35 Units by SubCUTAneous route nightly. pt takes 30 Units     albuterol (PROVENTIL HFA, VENTOLIN HFA, PROAIR HFA) 90 mcg/actuation inhaler Take 2 Puffs by inhalation every six (6) hours as needed for Wheezing.  acetaminophen (TYLENOL EXTRA STRENGTH) 500 mg tablet Take 2 Tabs by mouth every six (6) hours as needed for Pain.  mometasone (NASONEX) 50 mcg/actuation nasal spray 2 Sprays by Both Nostrils route daily. No current facility-administered medications for this visit. Health Maintenance   Topic Date Due    EYE EXAM RETINAL OR DILATED Q1  01[birth year]/1964    Shingrix Vaccine Age 50> (1 of 2) 01/29/2004    Influenza Age 5 to Adult  08/01/2018    HEMOGLOBIN A1C Q6M  10/05/2018    MICROALBUMIN Q1  04/05/2019    LIPID PANEL Q1  04/05/2019    MEDICARE YEARLY EXAM  04/06/2019    FOOT EXAM Q1  07/05/2019    COLONOSCOPY  12/14/2022    DTaP/Tdap/Td series (2 - Td) 04/04/2027    Hepatitis C Screening  Completed    Pneumococcal 19-64 Medium Risk  Completed     Immunization History   Administered Date(s) Administered    Influenza Vaccine (Quad) PF 10/20/2016, 10/26/2017    Pneumococcal Vaccine (Unspecified Type) 01/01/2013     No LMP for male patient. Allergies and Intolerances: Allergies   Allergen Reactions    Lasix [Furosemide] Other (comments)    Levofloxacin Rash    Penicillins Swelling       Family History:   Family History   Problem Relation Age of Onset    Heart Disease Mother     Heart Disease Father        Social History:   He  reports that he quit smoking about 2 years ago. His smoking use included Cigarettes.  He has a 45.00 pack-year smoking history. He has never used smokeless tobacco.  He  reports that he does not drink alcohol. Review of Systems:   General: negative for - chills, fatigue, fever, weight change  Psych: negative for - anxiety, depression, irritability or mood swings  ENT: negative for - headaches, hearing change, nasal congestion, oral lesions, sneezing or sore throat  Heme/ Lymph: negative for - bleeding problems, bruising, pallor or swollen lymph nodes  Endo: negative for - hot flashes, polydipsia/polyuria or temperature intolerance  Resp: negative for - cough, shortness of breath or wheezing  CV: negative for - chest pain, edema or palpitations  GI: negative for - abdominal pain, change in bowel habits, constipation, diarrhea or nausea/vomiting  : negative for - dysuria, hematuria, incontinence, pelvic pain or vulvar/vaginal symptoms  MSK: negative for - joint pain, joint swelling or muscle pain, positive for left shoulder pain  Neuro: negative for - confusion, headaches, seizures or weakness  Derm: negative for - dry skin, hair changes, rash or skin lesion changes          Physical:   Vitals:   Vitals:    10/05/18 1119   BP: 131/83   Pulse: 74   Resp: 16   Temp: 98.2 °F (36.8 °C)   TempSrc: Oral   SpO2: 95%   Weight: 225 lb (102.1 kg)   Height: 5' 11\" (1.803 m)           Exam:   HEENT- atraumatic,normocephalic, awake, oriented, well nourished  Neck - supple,no enlarged lymph nodes, no JVD, no thyromegaly  Chest- CTA, no rhonchi, no crackles  Heart- rrr, no murmurs / gallop/rub  Abdomen- soft,BS+,NT, no hepatosplenomegaly  Ext - no c/c/edema , diminished range of motion at left shoulder  Neuro- no focal deficits. Power 5/5 all extremities  Skin - warm,dry, no obvious rashes.           Review of Data:   LABS:   Lab Results   Component Value Date/Time    WBC 6.4 10/26/2017 11:35 AM    HGB 14.3 10/26/2017 11:35 AM    HCT 44.2 10/26/2017 11:35 AM    PLATELET 505 56/12/4890 11:35 AM     Lab Results   Component Value Date/Time    Sodium 139 04/05/2018 10:35 AM    Potassium 3.7 04/05/2018 10:35 AM    Chloride 101 04/05/2018 10:35 AM    CO2 23 04/05/2018 10:35 AM    Glucose 94 04/05/2018 10:35 AM    BUN 24 04/05/2018 10:35 AM    Creatinine 1.24 04/05/2018 10:35 AM     Lab Results   Component Value Date/Time    Cholesterol, total 157 04/05/2018 10:35 AM    HDL Cholesterol 61 04/05/2018 10:35 AM    LDL, calculated 76 04/05/2018 10:35 AM    Triglyceride 98 04/05/2018 10:35 AM     No results found for: GPT        Impression / Plan:        ICD-10-CM ICD-9-CM    1. Type 2 diabetes mellitus with diabetic neuropathy, with long-term current use of insulin ([facility]) E11.40 250.60 AMB POC HEMOGLOBIN A1C    Z79.4 357.2      V58.67    2. Adhesive capsulitis of left shoulder M75.02 726.0    3. PAD (peripheral artery disease) ([facility]) I73.9 443.9    4. Chronic combined systolic and diastolic heart failure ([facility]) I50.42 428.42    5. Hypercholesteremia E78.00 272.0    6. Essential hypertension I10 401.9      DM - well controlled    PAD - well controlled    Frozen shoulder - stretching exercises. HTN/HyperCol- stable    Explained to patient risk benefits of the medications. Advised patient to stop meds if having any side effects. Pt verbalized understanding of the instructions. I have discussed the diagnosis with the patient and the intended plan as seen in the above orders. The patient has received an after-visit summary and questions were answered concerning future plans. I have discussed medication side effects and warnings with the patient as well. I have reviewed the plan of care with the patient, accepted their input and they are in agreement with the treatment goals. Reviewed plan of care. Patient has provided input and agrees with goals.     Follow-up Disposition: Not on Lena Carrillo MD

## 2018-10-05 NOTE — PROGRESS NOTES
Niko Falk FNP, [facility] - needs progress notes and med list      1. Have you been to the ER, urgent care clinic since your last visit? Hospitalized since your last visit? No    2. Have you seen or consulted any other health care providers outside of the [address] [place] [address] since your last visit? Include any pap smears or colon screening.  No

## 2018-10-05 NOTE — MR AVS SNAPSHOT
[address] [place], [place] [address]y [address] [place] [address] [id number] 
[phone] Patient: Kingston Carr MRN: [id number] Visit Information Date & Time Provider Department Dept. Phone Encounter #  
 10/5/2018 11:15 AM [id number], [address] [place] [address] [phone] [id number] Follow-up Instructions Return in about 3 months (around 1/5/2019). Your Appointments 10/10/2018 11:30 AM  
PROCEDURE with Roldan Lambert Csi Cardio Specialist at [address]) Appt Note: 1 year BiV AICD check Medtr[employer]; LMOM contact office to r/s appointment. Provider out of office; r/s; Located within [facility] to contact office to reschedule appt  out of office.; r/s provider out of office [address] [place] [address] [address] [place] [address] [place] [address]  
  
    
 12/13/2018 11:30 AM  
Follow Up with Luciana Rico MD  
Cardio Specialist at [address]) Appt Note: 9 months follow up  
 [address] [place] [address] [address] [place] [address] [place] [address] Upcoming Health Maintenance Date Due  
 EYE EXAM RETINAL OR DILATED Q1 1/29/1965 Shingrix Vaccine Age 50> (1 of 2) 1/29/2004 Influenza Age 5 to Adult 8/1/2018 HEMOGLOBIN A1C Q6M 10/5/2018 MICROALBUMIN Q1 4/5/2019 LIPID PANEL Q1 4/5/2019 MEDICARE YEARLY EXAM 4/6/2019 FOOT EXAM Q1 7/5/2019 COLONOSCOPY 12/14/2022 DTaP/Tdap/Td series (2 - Td) 4/4/2027 Allergies as of 10/5/2018  Review Complete On: 10/5/2018 By: Priscila Williamson LPN Severity Noted Reaction Type Reactions Lasix [Furosemide]  05/16/2016    Other (comments) Levofloxacin  05/25/2016    Rash Penicillins  11/28/2015    Swelling Current Immunizations  Reviewed on 10/20/2016 Name Date Influenza Vaccine (Quad) PF 10/26/2017, 10/20/2016 Pneumococcal Vaccine (Unspecified Type) 1/1/2013 Not reviewed this visit You Were Diagnosed With   
  
 Codes Comments Type 2 diabetes mellitus with diabetic neuropathy, with long-term current use of insulin (HCC)    -  Primary ICD-10-CM: E11.40, Z79.4 ICD-9-CM: 250.60, 357.2, V58.67 Adhesive capsulitis of left shoulder     ICD-10-CM: M75.02 
ICD-9-CM: 726.0   
 PAD (peripheral artery disease) ([facility])     ICD-10-CM: I73.9 ICD-9-CM: 443. 9 Chronic combined systolic and diastolic heart failure (HCC)     ICD-10-CM: I50.42 
ICD-9-CM: 428.42 Hypercholesteremia     ICD-10-CM: E78.00 ICD-9-CM: 272.0 Essential hypertension     ICD-10-CM: I10 
ICD-9-CM: 401.9 Vitals BP Pulse Temp Resp Height(growth percentile) Weight(growth percentile) 131/83 74 98.2 °F (36.8 °C) (Oral) 16 5' 11\" (1.803 m) 225 lb (102.1 kg) SpO2 BMI Smoking Status 95% 31.38 kg/m2 Former Smoker Vitals History BMI and BSA Data Body Mass Index Body Surface Area  
 31.38 kg/m 2 2.26 m 2 Preferred Pharmacy Pharmacy Name Phone [facility] - [address], [address] [phone] Your Updated Medication List  
  
   
This list is accurate as of 10/5/18 11:46 AM.  Always use your most recent med list.  
  
  
  
  
 acetaminophen 500 mg tablet Commonly known as:  [address] Take 2 Tabs by mouth every six (6) hours as needed for Pain. albuterol 90 mcg/actuation inhaler Commonly known as:  PROVENTIL HFA, VENTOLIN HFA, PROAIR HFA Take 2 Puffs by inhalation every six (6) hours as needed for Wheezing. aspirin 81 mg chewable tablet Take 1 Tab by mouth daily. Indications: MYOCARDIAL INFARCTION PREVENTION  
  
 atorvastatin 80 mg tablet Commonly known as:  LIPITOR Take 1 Tab by mouth nightly. budesonide-formoterol 160-4.5 mcg/actuation Hfaa Commonly known as:  SYMBICORT Take 2 Puffs by inhalation two (2) times a day. carvedilol 25 mg tablet Commonly known as:  Riley Friar Take 0.5 Tabs by mouth two (2) times daily (with meals). 1/2 tab BID  
  
 cholecalciferol 50,000 unit capsule Commonly known as:  VITAMIN D3 Take  by mouth. hydrALAZINE 25 mg tablet Commonly known as:  APRESOLINE Take 1 Tab by mouth three (3) times daily. hydroCHLOROthiazide 25 mg tablet Commonly known as:  HYDRODIURIL Take 1 Tab by mouth daily. LANTUS U-100 INSULIN 100 unit/mL injection Generic drug:  insulin glargine 35 Units by SubCUTAneous route nightly. pt takes 30 Units  
  
 losartan 100 mg tablet Commonly known as:  COZAAR Take 1 Tab by mouth daily. mometasone 50 mcg/actuation nasal spray Commonly known as:  NASONEX  
2 Sprays by Both Nostrils route daily. NIFEdipine ER 60 mg ER tablet Commonly known as:  ADALAT CC Take 1 Tab by mouth daily. NITROSTAT 0.4 mg SL tablet Generic drug:  nitroglycerin  
by SubLINGual route every five (5) minutes as needed for Chest Pain.  
  
 pantoprazole 40 mg tablet Commonly known as:  PROTONIX Take 1 Tab by mouth daily. We Performed the Following AMB POC HEMOGLOBIN A1C [64282 CPT(R)] Follow-up Instructions Return in about 3 months (around 1/5/2019). Introducing [facility]! Dear Jose Tucker: Thank you for requesting a [facility] account. Our records indicate that you already have an active [facility] account. You can access your account anytime at [email or web address] Did you know that you can access your hospital and ER discharge instructions at any time in [facility]? You can also review all of your test results from your hospital stay or ER visit. Additional Information If you have questions, please visit the Frequently Asked Questions section of the [facility] website at [email or web address] Remember, [facility] is NOT to be used for urgent needs.  For medical emergencies, dial 911. Now available from your iPhone and Android! Please provide this summary of care documentation to your next provider. Your primary care clinician is listed as Kristen Rudolph. If you have any questions after today's visit, please call [phone].

## 2018-10-10 ENCOUNTER — CLINICAL SUPPORT (OUTPATIENT)
Dept: CARDIOLOGY CLINIC | Age: 63
End: 2018-10-10

## 2018-10-10 DIAGNOSIS — Z95.810 AICD (AUTOMATIC CARDIOVERTER/DEFIBRILLATOR) PRESENT: Primary | ICD-10-CM

## 2018-12-13 ENCOUNTER — OFFICE VISIT (OUTPATIENT)
Dept: CARDIOLOGY CLINIC | Age: 63
End: 2018-12-13

## 2018-12-13 VITALS
HEART RATE: 96 BPM | OXYGEN SATURATION: 97 % | WEIGHT: 230 LBS | SYSTOLIC BLOOD PRESSURE: 150 MMHG | HEIGHT: 71 IN | BODY MASS INDEX: 32.2 KG/M2 | DIASTOLIC BLOOD PRESSURE: 92 MMHG

## 2018-12-13 DIAGNOSIS — I50.42 CHRONIC COMBINED SYSTOLIC AND DIASTOLIC HEART FAILURE (HCC): ICD-10-CM

## 2018-12-13 DIAGNOSIS — I25.10 CORONARY ARTERY DISEASE DUE TO LIPID RICH PLAQUE: ICD-10-CM

## 2018-12-13 DIAGNOSIS — I10 ESSENTIAL HYPERTENSION WITH GOAL BLOOD PRESSURE LESS THAN 140/90: ICD-10-CM

## 2018-12-13 DIAGNOSIS — I42.9 CARDIOMYOPATHY, UNSPECIFIED TYPE (HCC): ICD-10-CM

## 2018-12-13 DIAGNOSIS — I25.83 CORONARY ARTERY DISEASE DUE TO LIPID RICH PLAQUE: ICD-10-CM

## 2018-12-13 DIAGNOSIS — Z95.810 AICD (AUTOMATIC CARDIOVERTER/DEFIBRILLATOR) PRESENT: Primary | ICD-10-CM

## 2018-12-13 NOTE — PROGRESS NOTES
1. Have you been to the ER, urgent care clinic since your last visit? Hospitalized since your last visit? No      2. Have you seen or consulted any other health care providers outside of the [address] [place] [address] since your last visit?   Include any pap smears or colon screening No

## 2018-12-13 NOTE — PROGRESS NOTES
Cardiovascular Specialists    Mr. Harris Sandoval is a 58 y.o. male with a history of coronary artery disease, status post OM1 stent in November, 2015, diabetes, hypertension, stroke, hyperlipidemia, cardiomyopathy s/p BiV ICD in 07/16    Mr. Harris Sandoval is here today for follow up appointment. Denies any resting or exertional chest pain or chest pressure to suggest angina or any dyspnea to suggest decompensated heart failure. No presyncope or syncope  Denies any PND or LE edema. Taking all medications regularly. No ICD shock. Denies any nausea, vomiting, abdominal pain, fever, chills, sputum production. No hematuria or other bleeding complaints    Past Medical History:   Diagnosis Date    Biventricular ICD (implantable cardioverter-defibrillator) in place 07/16    Medtronic    CAD (coronary artery disease)     Cardiomyopathy, ischemic     EF 30% (04/16) 30-35% (11/15)    Diabetes ([address]     DVT (deep venous thrombosis) ([facility] 08/16    L axillary vein after PPM insertion    HLD (hyperlipidemia)     Hypertension     NSTEMI (non-ST elevated myocardial infarction) ([address]     S/P OM1 2.5 X 23 mm XIENCE (11/15)    Pulmonary emphysema ([address]     Stroke [facility])          Past Surgical History:   Procedure Laterality Date    COLONOSCOPY N/A 12/14/2017    COLONOSCOPY performed by Brady Cook MD at [address] [place] [address] HX PACEMAKER PLACEMENT      VASCULAR SURGERY PROCEDURE UNLIST      Stent placed right thigh       Current Outpatient Medications   Medication Sig    hydrALAZINE (APRESOLINE) 25 mg tablet Take 1 Tab by mouth three (3) times daily.  pantoprazole (PROTONIX) 40 mg tablet Take 1 Tab by mouth daily.  losartan (COZAAR) 100 mg tablet Take 1 Tab by mouth daily.  hydroCHLOROthiazide (HYDRODIURIL) 25 mg tablet Take 1 Tab by mouth daily.  atorvastatin (LIPITOR) 80 mg tablet Take 1 Tab by mouth nightly.     carvedilol (COREG) 25 mg tablet Take 0.5 Tabs by mouth two (2) times daily (with meals). 1/2 tab BID    NIFEdipine ER (ADALAT CC) 60 mg ER tablet Take 1 Tab by mouth daily.  nitroglycerin (NITROSTAT) 0.4 mg SL tablet by SubLINGual route every five (5) minutes as needed for Chest Pain.  budesonide-formoterol (SYMBICORT) 160-4.5 mcg/actuation HFAA Take 2 Puffs by inhalation two (2) times a day.  Cholecalciferol, Vitamin D3, 50,000 unit cap Take  by mouth.  albuterol (PROVENTIL HFA, VENTOLIN HFA, PROAIR HFA) 90 mcg/actuation inhaler Take 2 Puffs by inhalation every six (6) hours as needed for Wheezing.  acetaminophen (TYLENOL EXTRA STRENGTH) 500 mg tablet Take 2 Tabs by mouth every six (6) hours as needed for Pain.  mometasone (NASONEX) 50 mcg/actuation nasal spray 2 Sprays by Both Nostrils route daily.  aspirin 81 mg chewable tablet Take 1 Tab by mouth daily. Indications: MYOCARDIAL INFARCTION PREVENTION    insulin glargine (LANTUS) 100 unit/mL injection 35 Units by SubCUTAneous route nightly. pt takes 30 Units      No current facility-administered medications for this visit. Allergies and Sensitivities:  Allergies   Allergen Reactions    Lasix [Furosemide] Other (comments)    Levofloxacin Rash    Penicillins Swelling       Family History:  Family History   Problem Relation Age of Onset    Heart Disease Mother     Heart Disease Father        Social History:  Social History     Tobacco Use    Smoking status: Former Smoker     Packs/day: 1.50     Years: 30.00     Pack years: 45.00     Types: Cigarettes     Last attempt to quit: 11/13/2015     Years since quitting: 3.0    Smokeless tobacco: Never Used   Substance Use Topics    Alcohol use: No     Alcohol/week: 0.0 oz    Drug use: No     He  reports that he quit smoking about 3 years ago. His smoking use included cigarettes. He has a 45.00 pack-year smoking history. he has never used smokeless tobacco.  He  reports that he does not drink alcohol.     Review of Systems:  Cardiac symptoms as noted above in HPI. All others negative. Denies malaise, skin rash, blurring vision, photophobia, neck pain, hemoptysis, chronic cough, nausea, vomiting, hematuria, burning micturition, BRBPR, chronic headaches. Physical Exam:  BP Readings from Last 3 Encounters:   12/13/18 (!) 150/92   10/05/18 131/83   07/05/18 140/83         Pulse Readings from Last 3 Encounters:   12/13/18 96   10/05/18 74   07/05/18 76          Wt Readings from Last 3 Encounters:   12/13/18 230 lb (104.3 kg)   10/05/18 225 lb (102.1 kg)   07/05/18 223 lb (101.2 kg)       Constitutional: Oriented to person, place, and time. HENT: Head: Normocephalic and atraumatic. Neck: No JVD present. Cardiovascular: Regular rhythm. No murmur, gallop or rubs appreciated  Lung: Breath sounds normal. No respiratory distress. No ronchi or rales appreciated  Abdominal: No tenderness. No rebound and no guarding. Musculoskeletal: There is no lower extremity edema. No cynosis. Review of Data  LABS:   Lab Results   Component Value Date/Time    Sodium 139 04/05/2018 10:35 AM    Potassium 3.7 04/05/2018 10:35 AM    Chloride 101 04/05/2018 10:35 AM    CO2 23 04/05/2018 10:35 AM    Glucose 94 04/05/2018 10:35 AM    BUN 24 04/05/2018 10:35 AM    Creatinine 1.24 04/05/2018 10:35 AM     Lipids Latest Ref Rng & Units 4/5/2018 10/26/2017 4/4/2017 9/23/2016 5/10/2016   Chol, Total 100 - 199 mg/dL 157 170 145 128 120   HDL >39 mg/dL 61 57 59 59 50   LDL 0 - 99 mg/dL 76 89 65 50 37   Trig 0 - 149 mg/dL 98 120 104 94 165(H)   Chol/HDL Ratio 0 - 5.0   - - - - -   Some recent data might be hidden     Lab Results   Component Value Date/Time    ALT (SGPT) 22 04/05/2018 10:35 AM     Lab Results   Component Value Date/Time    Hemoglobin A1c 6.5 (H) 04/05/2018 10:35 AM       EKG    ECHO (11/15)  Left ventricle: The ventricle was dilated. Systolic function was moderately reduced. Ejection fraction was estimated in the range of 30 %  to 35 %.  There was mild diffuse hypokinesis. There was severe hypokinesis of inferior/inferolateral wall. Wall thickness was mildly increased. Doppler parameters were consistent with abnormal left ventricular relaxation (grade 1 diastolic dysfunction). Right ventricle: Systolic function was normal.  Aortic valve: There was no stenosis. ECHO (04/16)  Left ventricle: Size was at the upper limits of normal. Systolic function  was moderately to markedly reduced by visual assessment. Ejection fraction  was estimated to be 30 %. There was severe hypokinesis of the basal-mid  inferior and basal-mid inferolateral wall(s). Wall thickness was mildly  increased. Doppler parameters were consistent with abnormal left  ventricular relaxation (grade 1 diastolic dysfunction). Right ventricle: Systolic function was normal. Estimated peak pressure was in the range of 30 mmHg to 35 mmHg. Left atrium: The atrium was mildly dilated. Right atrium: The atrium was mildly dilated. Mitral valve: There was mild annular calcification. There was mild regurgitation. Aortic valve: There was no stenosis. Tricuspid valve: There was trivial regurgitation. CATHETERIZATION (11/15)  - LVEF estimated to be 30% with diffuse hypokinesis. No significant mitral regurgitation was noted. - LM: calcification, 20% distal   - LAD had diffuse 50% stenosis in its midportion of the vessel with only luminal irregularities in the proximal LAD. There was a  discrete 50% distal vessel stenosis, as well. The diagonal branches had mild disease.   - LCx: Circumflex had an ostial 50-60% stenosis,   - OM1: 100% thrombotic occlusion proximally. There was very faint collateral filling from the RCA. - RCA: Prox  50% diffuse, 40% distal disease. The right PDA had an 80% ostial stenosis and then a 90% mid vessel stenosis. The right  posterolateral branch had a 90% stenosis in a sub branch. 4. PCI: 100% OM-1 stenosis reduced to 0%.   drug-eluting stent, Xience 2.5 x 23 mm     IMPRESSION & PLAN:  Mr. Nida Alvarez is a 58 y.o. male with cardiomyopathy, coronary artery disease, hypertension, hyperlipidemia. Coronary artery disease:  Mr. Nida Alvarez had an OM1 stent in November, 2015. No angina. No use of S/L NTG since last visit. Continue with medical management, including aspirin lifelong. He is also on Coreg and Lipitor. Cardiomyopathy:    His initial ejection fraction in a setting of NSTEMI was 30-35% in November 2015. A repeat EF remains 30% in April 2016, despite being on appropriate medical management. S/P Bi-V AICD placement by  in 07/16. On Coreg, Nifedipine, Losartan, hydrochlorothiazide. No ICD shock. NO fluid overload on exam today  Not on any bumex/ lasix at this time. ICD check per EP clinic protocol. Hypertension:  BP is 150/92 mm hg. Continue same meds. Repeat /88 mm Hg. Hyperlipidemia:  He is on Atorvastatin 80 mg daily. Continue same. Last LDL 76    Importance of diet and exercise was discussed with patient. This plan was discussed with patient who is in agreement. Thank you for allowing me to participate in patient care. Please feel free to call me if you have any question or concern. Zuleima Hartmann MD  Please note: This document has been produced using voice recognition software. Unrecognized errors in transcription may be present.

## 2019-01-07 ENCOUNTER — HOSPITAL ENCOUNTER (OUTPATIENT)
Dept: LAB | Age: 64
Discharge: HOME | End: 2019-01-07
Payer: MEDICARE

## 2019-01-07 ENCOUNTER — OFFICE VISIT (OUTPATIENT)
Dept: FAMILY MEDICINE CLINIC | Age: 64
End: 2019-01-07

## 2019-01-07 VITALS
TEMPERATURE: 97.5 F | BODY MASS INDEX: 31.92 KG/M2 | HEIGHT: 71 IN | DIASTOLIC BLOOD PRESSURE: 93 MMHG | RESPIRATION RATE: 18 BRPM | OXYGEN SATURATION: 95 % | WEIGHT: 228 LBS | HEART RATE: 91 BPM | SYSTOLIC BLOOD PRESSURE: 148 MMHG

## 2019-01-07 DIAGNOSIS — Z79.4 TYPE 2 DIABETES MELLITUS WITH DIABETIC NEUROPATHY, WITH LONG-TERM CURRENT USE OF INSULIN (HCC): Primary | ICD-10-CM

## 2019-01-07 DIAGNOSIS — J43.9 PULMONARY EMPHYSEMA, UNSPECIFIED EMPHYSEMA TYPE (HCC): ICD-10-CM

## 2019-01-07 DIAGNOSIS — J30.1 SEASONAL ALLERGIC RHINITIS DUE TO POLLEN: ICD-10-CM

## 2019-01-07 DIAGNOSIS — Z23 ENCOUNTER FOR IMMUNIZATION: ICD-10-CM

## 2019-01-07 DIAGNOSIS — I10 ESSENTIAL HYPERTENSION: ICD-10-CM

## 2019-01-07 DIAGNOSIS — E11.40 TYPE 2 DIABETES MELLITUS WITH DIABETIC NEUROPATHY, WITH LONG-TERM CURRENT USE OF INSULIN (HCC): ICD-10-CM

## 2019-01-07 DIAGNOSIS — I25.10 CORONARY ARTERY DISEASE INVOLVING NATIVE CORONARY ARTERY OF NATIVE HEART WITHOUT ANGINA PECTORIS: ICD-10-CM

## 2019-01-07 DIAGNOSIS — E11.40 TYPE 2 DIABETES MELLITUS WITH DIABETIC NEUROPATHY, WITH LONG-TERM CURRENT USE OF INSULIN (HCC): Primary | ICD-10-CM

## 2019-01-07 DIAGNOSIS — Z79.4 TYPE 2 DIABETES MELLITUS WITH DIABETIC NEUROPATHY, WITH LONG-TERM CURRENT USE OF INSULIN (HCC): ICD-10-CM

## 2019-01-07 DIAGNOSIS — N52.8 OTHER MALE ERECTILE DYSFUNCTION: ICD-10-CM

## 2019-01-07 LAB
ALBUMIN SERPL-MCNC: 3.8 G/DL (ref 3.4–5)
ALBUMIN/GLOB SERPL: 1.1 {RATIO} (ref 0.8–1.7)
ALP SERPL-CCNC: 144 U/L (ref 45–117)
ALT SERPL-CCNC: 40 U/L (ref 16–61)
ANION GAP SERPL CALC-SCNC: 8 MMOL/L (ref 3–18)
AST SERPL-CCNC: 26 U/L (ref 15–37)
BASOPHILS # BLD: 0 K/UL (ref 0–0.1)
BASOPHILS NFR BLD: 1 % (ref 0–2)
BILIRUB SERPL-MCNC: 0.3 MG/DL (ref 0.2–1)
BUN SERPL-MCNC: 20 MG/DL (ref 7–18)
BUN/CREAT SERPL: 16 (ref 12–20)
CALCIUM SERPL-MCNC: 9.1 MG/DL (ref 8.5–10.1)
CHLORIDE SERPL-SCNC: 106 MMOL/L (ref 100–108)
CHOLEST SERPL-MCNC: 175 MG/DL
CO2 SERPL-SCNC: 26 MMOL/L (ref 21–32)
CREAT SERPL-MCNC: 1.24 MG/DL (ref 0.6–1.3)
DIFFERENTIAL METHOD BLD: ABNORMAL
EOSINOPHIL # BLD: 0.5 K/UL (ref 0–0.4)
EOSINOPHIL NFR BLD: 8 % (ref 0–5)
ERYTHROCYTE [DISTWIDTH] IN BLOOD BY AUTOMATED COUNT: 14.8 % (ref 11.6–14.5)
EST. AVERAGE GLUCOSE BLD GHB EST-MCNC: 140 MG/DL
GLOBULIN SER CALC-MCNC: 3.6 G/DL (ref 2–4)
GLUCOSE SERPL-MCNC: 130 MG/DL (ref 74–99)
HBA1C MFR BLD: 6.5 % (ref 4.2–5.6)
HCT VFR BLD AUTO: 46.1 % (ref 36–48)
HDLC SERPL-MCNC: 65 MG/DL (ref 40–60)
HDLC SERPL: 2.7 {RATIO} (ref 0–5)
HGB BLD-MCNC: 14.7 G/DL (ref 13–16)
LDLC SERPL CALC-MCNC: 75 MG/DL (ref 0–100)
LIPID PROFILE,FLP: ABNORMAL
LYMPHOCYTES # BLD: 2.4 K/UL (ref 0.9–3.6)
LYMPHOCYTES NFR BLD: 40 % (ref 21–52)
MCH RBC QN AUTO: 27.8 PG (ref 24–34)
MCHC RBC AUTO-ENTMCNC: 31.9 G/DL (ref 31–37)
MCV RBC AUTO: 87.1 FL (ref 74–97)
MONOCYTES # BLD: 0.6 K/UL (ref 0.05–1.2)
MONOCYTES NFR BLD: 11 % (ref 3–10)
NEUTS SEG # BLD: 2.5 K/UL (ref 1.8–8)
NEUTS SEG NFR BLD: 40 % (ref 40–73)
PLATELET # BLD AUTO: 162 K/UL (ref 135–420)
PMV BLD AUTO: 12.3 FL (ref 9.2–11.8)
POTASSIUM SERPL-SCNC: 3.6 MMOL/L (ref 3.5–5.5)
PROT SERPL-MCNC: 7.4 G/DL (ref 6.4–8.2)
RBC # BLD AUTO: 5.29 M/UL (ref 4.7–5.5)
SODIUM SERPL-SCNC: 140 MMOL/L (ref 136–145)
TRIGL SERPL-MCNC: 175 MG/DL (ref ?–150)
VLDLC SERPL CALC-MCNC: 35 MG/DL
WBC # BLD AUTO: 6 K/UL (ref 4.6–13.2)

## 2019-01-07 PROCEDURE — 36415 COLL VENOUS BLD VENIPUNCTURE: CPT

## 2019-01-07 PROCEDURE — 80061 LIPID PANEL: CPT

## 2019-01-07 PROCEDURE — 83036 HEMOGLOBIN GLYCOSYLATED A1C: CPT

## 2019-01-07 PROCEDURE — 80053 COMPREHEN METABOLIC PANEL: CPT

## 2019-01-07 PROCEDURE — 85025 COMPLETE CBC W/AUTO DIFF WBC: CPT

## 2019-01-07 RX ORDER — CETIRIZINE HCL 10 MG
10 TABLET ORAL
Qty: 30 TAB | Refills: 2 | Status: SHIPPED | OUTPATIENT
Start: 2019-01-07 | End: 2021-08-25 | Stop reason: SDUPTHER

## 2019-01-07 RX ORDER — SILDENAFIL 50 MG/1
50 TABLET, FILM COATED ORAL AS NEEDED
Qty: 10 TAB | Refills: 2 | Status: SHIPPED | OUTPATIENT
Start: 2019-01-07 | End: 2020-03-18

## 2019-01-07 RX ORDER — LORATADINE 10 MG/1
10 TABLET ORAL
COMMUNITY
End: 2019-01-07

## 2019-01-07 NOTE — PROGRESS NOTES
Petr Hussein is a 58 y.o.  male and presents with     Chief Complaint   Patient presents with    Hypertension    Cholesterol Problem    Allergic Rhinitis    Erectile Dysfunction    Diabetes       Pt recently saw cardiology and was felt to be stable from cardiac standpoint  Pt denies chest pain, SOB, orthopnoea, PND , leg swelling  Pt is having allergies and is requesting meds  Pt says he has ED. HE wants to try Viagra. Pt is taking meds for HTN hyperchol, DM. No side effects noted. He is not on SL nitro as he does not have chest pain. Past Medical History:   Diagnosis Date    Biventricular ICD (implantable cardioverter-defibrillator) in place 07/16    [employer]tronic    CAD (coronary artery disease)     Cardiomyopathy, ischemic     EF 30% (04/16) 30-35% (11/15)    Diabetes [facility]     DVT (deep venous thrombosis) ([facility] 08/16    L axillary vein after PPM insertion    HLD (hyperlipidemia)     Hypertension     NSTEMI (non-ST elevated myocardial infarction) ([facility]     S/P OM1 2.5 X 23 mm XIENCE (11/15)    Pulmonary emphysema ([facility]     Stroke [facility])      Past Surgical History:   Procedure Laterality Date    COLONOSCOPY N/A 12/14/2017    COLONOSCOPY performed by Tonya Yanez MD at [facility] ENDOSCOPY    HX PACEMAKER PLACEMENT      VASCULAR SURGERY PROCEDURE UNLIST      Stent placed right thigh     Current Outpatient Medications   Medication Sig    sildenafil citrate (VIAGRA) 50 mg tablet Take 1 Tab by mouth as needed.  cetirizine (ZYRTEC) 10 mg tablet Take 1 Tab by mouth daily as needed for Allergies.  hydrALAZINE (APRESOLINE) 25 mg tablet Take 1 Tab by mouth three (3) times daily.  pantoprazole (PROTONIX) 40 mg tablet Take 1 Tab by mouth daily.  losartan (COZAAR) 100 mg tablet Take 1 Tab by mouth daily.  hydroCHLOROthiazide (HYDRODIURIL) 25 mg tablet Take 1 Tab by mouth daily.  atorvastatin (LIPITOR) 80 mg tablet Take 1 Tab by mouth nightly.     carvedilol (COREG) 25 mg tablet Take 0.5 Tabs by mouth two (2) times daily (with meals). 1/2 tab BID    NIFEdipine ER (ADALAT CC) 60 mg ER tablet Take 1 Tab by mouth daily.  budesonide-formoterol (SYMBICORT) 160-4.5 mcg/actuation HFAA Take 2 Puffs by inhalation two (2) times a day.  Cholecalciferol, Vitamin D3, 50,000 unit cap Take  by mouth.  albuterol (PROVENTIL HFA, VENTOLIN HFA, PROAIR HFA) 90 mcg/actuation inhaler Take 2 Puffs by inhalation every six (6) hours as needed for Wheezing.  mometasone (NASONEX) 50 mcg/actuation nasal spray 2 Sprays by Both Nostrils route daily.  aspirin 81 mg chewable tablet Take 1 Tab by mouth daily. Indications: MYOCARDIAL INFARCTION PREVENTION    insulin glargine (LANTUS) 100 unit/mL injection 35 Units by SubCUTAneous route nightly. pt takes 30 Units     acetaminophen (TYLENOL EXTRA STRENGTH) 500 mg tablet Take 2 Tabs by mouth every six (6) hours as needed for Pain. No current facility-administered medications for this visit. Health Maintenance   Topic Date Due    EYE EXAM RETINAL OR DILATED  01/29/1965    Shingrix Vaccine Age 50> (1 of 2) 01/29/2004    Influenza Age 5 to Adult  08/01/2018    HEMOGLOBIN A1C Q6M  10/05/2018    MICROALBUMIN Q1  04/05/2019    LIPID PANEL Q1  04/05/2019    MEDICARE YEARLY EXAM  04/06/2019    FOOT EXAM Q1  07/05/2019    COLONOSCOPY  12/14/2022    DTaP/Tdap/Td series (2 - Td) 04/04/2027    Hepatitis C Screening  Completed    Pneumococcal 19-64 Medium Risk  Completed     Immunization History   Administered Date(s) Administered    Influenza Vaccine (Quad) PF 10/20/2016, 10/26/2017    Pneumococcal Vaccine (Unspecified Type) 01/01/2013     No LMP for male patient. Allergies and Intolerances:    Allergies   Allergen Reactions    Lasix [Furosemide] Other (comments)    Levofloxacin Rash    Penicillins Swelling       Family History:   Family History   Problem Relation Age of Onset    Heart Disease Mother     Heart Disease Father        Social History:   He  reports that he quit smoking about 3 years ago. His smoking use included cigarettes. He has a 45.00 pack-year smoking history. he has never used smokeless tobacco.  He  reports that he does not drink alcohol. Review of Systems:   General: negative for - chills, fatigue, fever, weight change  Psych: negative for - anxiety, depression, irritability or mood swings  ENT: negative for - headaches, hearing change, nasal congestion, oral lesions, sneezing or sore throat  Heme/ Lymph: negative for - bleeding problems, bruising, pallor or swollen lymph nodes  Endo: negative for - hot flashes, polydipsia/polyuria or temperature intolerance  Resp: negative for - cough, shortness of breath or wheezing  CV: negative for - chest pain, edema or palpitations  GI: negative for - abdominal pain, change in bowel habits, constipation, diarrhea or nausea/vomiting  : negative for - dysuria, hematuria, incontinence, pelvic pain or vulvar/vaginal symptoms  MSK: negative for - joint pain, joint swelling or muscle pain  Neuro: negative for - confusion, headaches, seizures or weakness  Derm: negative for - dry skin, hair changes, rash or skin lesion changes          Physical:   Vitals:   Vitals:    01/07/19 1312   BP: (!) 148/93   Pulse: 91   Resp: 18   Temp: 97.5 °F (36.4 °C)   TempSrc: Oral   SpO2: 95%   Weight: 228 lb (103.4 kg)   Height: 5' 11\" (1.803 m)           Exam:   HEENT- atraumatic,normocephalic, awake, oriented, well nourished  Neck - supple,no enlarged lymph nodes, no JVD, no thyromegaly  Chest- CTA, no rhonchi, no crackles  Heart- rrr, no murmurs / gallop/rub  Abdomen- soft,BS+,NT, no hepatosplenomegaly  Ext - no c/c/edema   Neuro- no focal deficits. Power 5/5 all extremities  Skin - warm,dry, no obvious rashes.           Review of Data:   LABS:   Lab Results   Component Value Date/Time    WBC 6.4 10/26/2017 11:35 AM    HGB 14.3 10/26/2017 11:35 AM    HCT 44.2 10/26/2017 11:35 AM    PLATELET 930 10/26/2017 11:35 AM     Lab Results   Component Value Date/Time    Sodium 139 04/05/2018 10:35 AM    Potassium 3.7 04/05/2018 10:35 AM    Chloride 101 04/05/2018 10:35 AM    CO2 23 04/05/2018 10:35 AM    Glucose 94 04/05/2018 10:35 AM    BUN 24 04/05/2018 10:35 AM    Creatinine 1.24 04/05/2018 10:35 AM     Lab Results   Component Value Date/Time    Cholesterol, total 157 04/05/2018 10:35 AM    HDL Cholesterol 61 04/05/2018 10:35 AM    LDL, calculated 76 04/05/2018 10:35 AM    Triglyceride 98 04/05/2018 10:35 AM     No results found for: GPT        Impression / Plan:        ICD-10-CM ICD-9-CM    1. Type 2 diabetes mellitus with diabetic neuropathy, with long-term current use of insulin (HCC) E11.40 250.60 HEMOGLOBIN A1C WITH EAG    Z79.4 357.2 LIPID PANEL     K59.26 METABOLIC PANEL, COMPREHENSIVE      CBC WITH AUTOMATED DIFF   2. Essential hypertension I10 401.9    3. Coronary artery disease involving native coronary artery of native heart without angina pectoris I25.10 414.01    4. Pulmonary emphysema, unspecified emphysema type ([facility] [address] J43.9 492.8    5. Other male erectile dysfunction N52.8 607.84 sildenafil citrate (VIAGRA) 50 mg tablet   6. Seasonal allergic rhinitis due to pollen J30.1 477.0 cetirizine (ZYRTEC) 10 mg tablet     HTN/Hyperchol/CMP/CAD - stable    Asked pt to stop viagra if chest pain occurs or blurry vision. Explained to patient risk benefits of the medications. Advised patient to stop meds if having any side effects. Pt verbalized understanding of the instructions. I have discussed the diagnosis with the patient and the intended plan as seen in the above orders. The patient has received an after-visit summary and questions were answered concerning future plans. I have discussed medication side effects and warnings with the patient as well. I have reviewed the plan of care with the patient, accepted their input and they are in agreement with the treatment goals. Reviewed plan of care. Patient has provided input and agrees with goals.     Follow-up Disposition: Not on Haseeb MD Shawn

## 2019-01-07 NOTE — PROGRESS NOTES
1. Have you been to the ER, urgent care clinic since your last visit? Hospitalized since your last visit? No    2. Have you seen or consulted any other health care providers outside of the [address] [place] [address] since your last visit? Include any pap smears or colon screening.  No

## 2019-01-09 ENCOUNTER — OFFICE VISIT (OUTPATIENT)
Dept: CARDIOLOGY CLINIC | Age: 64
End: 2019-01-09

## 2019-01-09 DIAGNOSIS — Z95.810 AICD (AUTOMATIC CARDIOVERTER/DEFIBRILLATOR) PRESENT: ICD-10-CM

## 2019-01-09 DIAGNOSIS — I42.9 CARDIOMYOPATHY, UNSPECIFIED TYPE (HCC): Primary | ICD-10-CM

## 2019-01-14 NOTE — PROGRESS NOTES
I have personally seen and evaluated the device findings. Interrogation reviewed and I agree with assessment.     Bridgett Lewis

## 2019-03-26 ENCOUNTER — OFFICE VISIT (OUTPATIENT)
Dept: FAMILY MEDICINE CLINIC | Age: 64
End: 2019-03-26

## 2019-03-26 VITALS
DIASTOLIC BLOOD PRESSURE: 87 MMHG | OXYGEN SATURATION: 97 % | BODY MASS INDEX: 31.39 KG/M2 | WEIGHT: 224.2 LBS | HEIGHT: 71 IN | SYSTOLIC BLOOD PRESSURE: 139 MMHG | TEMPERATURE: 95.6 F | HEART RATE: 84 BPM | RESPIRATION RATE: 20 BRPM

## 2019-03-26 DIAGNOSIS — L85.3 DRY SKIN: ICD-10-CM

## 2019-03-26 DIAGNOSIS — Z79.4 TYPE 2 DIABETES MELLITUS WITH HYPERGLYCEMIA, WITH LONG-TERM CURRENT USE OF INSULIN (HCC): ICD-10-CM

## 2019-03-26 DIAGNOSIS — Z13.6 SCREENING FOR AAA (ABDOMINAL AORTIC ANEURYSM): Primary | ICD-10-CM

## 2019-03-26 DIAGNOSIS — E11.65 TYPE 2 DIABETES MELLITUS WITH HYPERGLYCEMIA, WITH LONG-TERM CURRENT USE OF INSULIN (HCC): ICD-10-CM

## 2019-03-26 DIAGNOSIS — I25.10 CORONARY ARTERY DISEASE INVOLVING NATIVE CORONARY ARTERY WITHOUT ANGINA PECTORIS, UNSPECIFIED WHETHER NATIVE OR TRANSPLANTED HEART: ICD-10-CM

## 2019-03-26 DIAGNOSIS — E66.9 OBESITY (BMI 30-39.9): ICD-10-CM

## 2019-03-26 DIAGNOSIS — E78.2 MIXED HYPERLIPIDEMIA: ICD-10-CM

## 2019-03-26 DIAGNOSIS — J43.1 PANLOBULAR EMPHYSEMA (HCC): ICD-10-CM

## 2019-03-26 PROBLEM — E11.21 TYPE 2 DIABETES WITH NEPHROPATHY (HCC): Status: ACTIVE | Noted: 2019-03-26

## 2019-03-26 RX ORDER — LORATADINE 10 MG/1
10 TABLET ORAL
COMMUNITY
End: 2020-03-18

## 2019-03-26 RX ORDER — NITROGLYCERIN 0.4 MG/1
0.4 TABLET SUBLINGUAL
COMMUNITY
End: 2021-09-22 | Stop reason: SDUPTHER

## 2019-03-26 NOTE — PROGRESS NOTES
Bozena Almaraz is a 71 y.o.  male and presents with Chief Complaint Patient presents with  Diabetes  Dry Skin Subjective: 
Diabetes Mellitus Patient presents for evaluation of for follow up on diabetes. Onset of symptoms was 17 years ago, problem is longstanding. He describes symptoms as paresthesias of the feet: mild. Course to date has been well controlled. Patient denies polyuria, polydipsia, visual disturbances and chest pain. Home sugars are running: BGs range between 80 and 90 Previous visits for this problem: multiple, this is a diagnosis of longstanding. Last seen 2 months ago by the patient's PCP. Evaluation to date has been see lab results. Treatment goals: Glycemic control: excellent BP control: acceptable Treatment to date has been decreased dose of insulin: effective. Cardiovascular Review: 
The patient has hypertension, hyperlipidemia, coronary artery disease, obesity and pacemaker. Diet and Lifestyle: generally follows a low fat low cholesterol diet, generally follows a low sodium diet, follows a diabetic diet regularly, exercises sporadically, smoker 1 pack per day Home BP Monitoring: is not measured at home. Pertinent ROS: taking medications as instructed, no medication side effects noted, no TIA's, no chest pain on exertion, no dyspnea on exertion, no swelling of ankles. Additional Concerns: dry skin ROS General ROS: negative for - chills or fever Psychological ROS: negative for - anxiety or depression Respiratory ROS: no cough, shortness of breath, or wheezing Cardiovascular ROS: no chest pain or dyspnea on exertion Gastrointestinal ROS: no abdominal pain, change in bowel habits, or black or bloody stools Genito-Urinary ROS: no dysuria, trouble voiding, or hematuria Musculoskeletal ROS: negative for - joint pain Neurological ROS: positive for - numbness/tingling Dermatological ROS: positive for - dry skin All other systems reviewed and are negative. Objective: 
Vitals:  
 03/26/19 1120 BP: 139/87 Pulse: 84 Resp: 20 Temp: 95.6 °F (35.3 °C) TempSrc: Oral  
SpO2: 97% Weight: 224 lb 3.2 oz (101.7 kg) Height: 5' 11\" (1.803 m) PainSc:   0 - No pain  
 
 
alert, well appearing, and in no distress, oriented to person, place, and time and obese Mental status - normal mood, behavior, speech, dress, motor activity, and thought processes Chest - clear to auscultation, no wheezes, rales or rhonchi, symmetric air entry Heart - normal rate, regular rhythm, normal S1, S2, no murmurs, rubs, clicks or gallops Neurological - cranial nerves II through XII intact, wide base gait and station Extremities - peripheral pulses normal, no pedal edema, no clubbing or cyanosis Skin - dry skin LABS  
hgb a1c 6.5 Assessment/Plan: 1. Screening for AAA (abdominal aortic aneurysm) 45 pack year history - US EXAM SCREENING AAA; Future 2. Obesity (BMI 30-39. 9) I have reviewed/discussed the above normal BMI with the patient. I have recommended the following interventions: dietary management education, guidance, and counseling and encourage exercise . Benna Him 3. Mixed hyperlipidemia Continue statin 4. Panlobular emphysema ([address] Continue inhaled therapy 5. Coronary artery disease involving native coronary artery without angina pectoris, unspecified whether native or transplanted heart F/u with cardiology 6. Type 2 diabetes mellitus with hyperglycemia, with long-term current use of insulin ([facility]) Goal hgb a1c <7; well controlled 7. Dry skin Topical treatment 
- cetaphil (CETAPHIL) topical cream; Apply  to affected area as needed (dry skin). Dispense: 453 g; Refill: 1 Lab review: labs reviewed, I note that glycosylated hemoglobin mildly abnormal but acceptable I have discussed the diagnosis with the patient and the intended plan as seen in the above orders. The patient has received an after-visit summary and questions were answered concerning future plans. I have discussed medication side effects and warnings with the patient as well. I have reviewed the plan of care with the patient, accepted their input and they are in agreement with the treatment goals.

## 2019-03-26 NOTE — PROGRESS NOTES
Chief Complaint Patient presents with  Transitions Of Care  Dry Skin 1. Have you been to the ER, urgent care clinic since your last visit? Hospitalized since your last visit? No 
 
2. Have you seen or consulted any other health care providers outside of the [address] [place] [address] since your last visit? Include any pap smears or colon screening. Yes Where:  [facility]

## 2019-04-10 ENCOUNTER — OFFICE VISIT (OUTPATIENT)
Dept: CARDIOLOGY CLINIC | Age: 64
End: 2019-04-10

## 2019-04-10 DIAGNOSIS — Z95.810 AICD (AUTOMATIC CARDIOVERTER/DEFIBRILLATOR) PRESENT: ICD-10-CM

## 2019-04-10 DIAGNOSIS — I42.9 CARDIOMYOPATHY, UNSPECIFIED TYPE (HCC): Primary | ICD-10-CM

## 2019-04-10 NOTE — PROGRESS NOTES
I have personally seen and evaluated the device findings. Interrogation reviewed and I agree with assessment.     Dorcas Loving

## 2019-04-17 ENCOUNTER — HOSPITAL ENCOUNTER (OUTPATIENT)
Dept: ULTRASOUND IMAGING | Age: 64
Discharge: HOME | End: 2019-04-17
Attending: FAMILY MEDICINE
Payer: MEDICARE

## 2019-04-17 DIAGNOSIS — Z13.6 SCREENING FOR AAA (ABDOMINAL AORTIC ANEURYSM): ICD-10-CM

## 2019-04-17 PROCEDURE — 76706 US ABDL AORTA SCREEN AAA: CPT

## 2019-04-19 ENCOUNTER — TELEPHONE (OUTPATIENT)
Dept: FAMILY MEDICINE CLINIC | Age: 64
End: 2019-04-19

## 2019-04-22 NOTE — TELEPHONE ENCOUNTER
Spoke with patient. 2 patient identifiers confirmed. Patient advised on US result. Patient tolerated well, given opportunity to ask questions and does not have any further questions or concerns at this time.

## 2019-07-17 ENCOUNTER — OFFICE VISIT (OUTPATIENT)
Dept: CARDIOLOGY CLINIC | Age: 64
End: 2019-07-17

## 2019-07-17 DIAGNOSIS — I42.9 CARDIOMYOPATHY, UNSPECIFIED TYPE (HCC): ICD-10-CM

## 2019-07-17 DIAGNOSIS — Z95.810 AICD (AUTOMATIC CARDIOVERTER/DEFIBRILLATOR) PRESENT: Primary | ICD-10-CM

## 2019-07-24 NOTE — PROGRESS NOTES
I have personally seen and evaluated the device findings. Interrogation reviewed and I agree with assessment.     Janet Castaneda

## 2019-09-17 ENCOUNTER — OFFICE VISIT (OUTPATIENT)
Dept: CARDIOLOGY CLINIC | Age: 64
End: 2019-09-17

## 2019-09-17 VITALS
OXYGEN SATURATION: 97 % | HEIGHT: 71 IN | WEIGHT: 220 LBS | SYSTOLIC BLOOD PRESSURE: 133 MMHG | HEART RATE: 82 BPM | BODY MASS INDEX: 30.8 KG/M2 | DIASTOLIC BLOOD PRESSURE: 90 MMHG

## 2019-09-17 DIAGNOSIS — I25.83 CORONARY ARTERY DISEASE DUE TO LIPID RICH PLAQUE: ICD-10-CM

## 2019-09-17 DIAGNOSIS — I25.10 CORONARY ARTERY DISEASE DUE TO LIPID RICH PLAQUE: ICD-10-CM

## 2019-09-17 DIAGNOSIS — Z95.810 AICD (AUTOMATIC CARDIOVERTER/DEFIBRILLATOR) PRESENT: Primary | ICD-10-CM

## 2019-09-17 DIAGNOSIS — I50.42 CHRONIC COMBINED SYSTOLIC AND DIASTOLIC HEART FAILURE (HCC): ICD-10-CM

## 2019-09-17 DIAGNOSIS — I42.9 CARDIOMYOPATHY, UNSPECIFIED TYPE (HCC): ICD-10-CM

## 2019-09-17 NOTE — PROGRESS NOTES
1. Have you been to the ER, urgent care clinic since your last visit? Hospitalized since your last visit? No    2. Have you seen or consulted any other health care providers outside of the [address] [place] [address] since your last visit? Include any pap smears or colon screening.  No

## 2019-09-17 NOTE — PROGRESS NOTES
[facility]    Mr. Ede Montilla is a 71 y.o. male with a history of coronary artery disease, status post OM1 stent in November, 2015, diabetes, hypertension, stroke, hyperlipidemia, cardiomyopathy s/p BiV ICD in 07/16    Mr. Ede Montilla is here today for follow up appointment. He denies any symptoms to suggest angina or heart failure. He denies any use of sublingual nitroglycerin. He denies any palpitations, presyncope or syncope. He uses 1-2 pillows at night. He denies any ICD shock. He takes his medication regularly. Denies any nausea, vomiting, abdominal pain, fever, chills, sputum production. No hematuria or other bleeding complaints    Past Medical History:   Diagnosis Date    Biventricular ICD (implantable cardioverter-defibrillator) in place 07/16    [employer]tr[employer]    CAD (coronary artery disease)     Cardiomyopathy, ischemic     EF 30% (04/16) 30-35% (11/15)    Diabetes ([address]     DVT (deep venous thrombosis) ([address] 08/16    L axillary vein after PPM insertion    HLD (hyperlipidemia)     Hypertension     NSTEMI (non-ST elevated myocardial infarction) ([address]     S/P OM1 2.5 X 23 mm XIENCE (11/15)    Pulmonary emphysema ([address]     Stroke [facility])          Past Surgical History:   Procedure Laterality Date    COLONOSCOPY N/A 12/14/2017    COLON[facility] by Edra Barthel, MD at [facility] ENDOSCOPY    HX PACEMAKER PLACEMENT      VASCULAR SURGERY PROCEDURE UNLIST      Stent placed right thigh       Current Outpatient Medications   Medication Sig    nitroglycerin (NITROSTAT) 0.4 mg SL tablet 0.4 mg by SubLINGual route every five (5) minutes as needed for Chest Pain. Up to 3 doses.  loratadine (CLARITIN) 10 mg tablet Take 10 mg by mouth.  cetaphil (CETAPHIL) topical cream Apply  to affected area as needed (dry skin).  sildenafil citrate (VIAGRA) 50 mg tablet Take 1 Tab by mouth as needed.     cetirizine (ZYRTEC) 10 mg tablet Take 1 Tab by mouth daily as needed for Allergies.  hydrALAZINE (APRESOLINE) 25 mg tablet Take 1 Tab by mouth three (3) times daily.  pantoprazole (PROTONIX) 40 mg tablet Take 1 Tab by mouth daily.  losartan (COZAAR) 100 mg tablet Take 1 Tab by mouth daily.  hydroCHLOROthiazide (HYDRODIURIL) 25 mg tablet Take 1 Tab by mouth daily.  atorvastatin (LIPITOR) 80 mg tablet Take 1 Tab by mouth nightly.  carvedilol (COREG) 25 mg tablet Take 0.5 Tabs by mouth two (2) times daily (with meals). 1/2 tab BID    NIFEdipine ER (ADALAT CC) 60 mg ER tablet Take 1 Tab by mouth daily.  budesonide-formoterol (SYMBICORT) 160-4.5 mcg/actuation HFAA Take 2 Puffs by inhalation two (2) times a day.  Cholecalciferol, Vitamin D3, 50,000 unit cap Take  by mouth.  albuterol (PROVENTIL HFA, VENTOLIN HFA, PROAIR HFA) 90 mcg/actuation inhaler Take 2 Puffs by inhalation every six (6) hours as needed for Wheezing.  acetaminophen (TYLENOL EXTRA STRENGTH) 500 mg tablet Take 2 Tabs by mouth every six (6) hours as needed for Pain.  mometasone (NASONEX) 50 mcg/actuation nasal spray 2 Sprays by Both Nostrils route daily.  aspirin 81 mg chewable tablet Take 1 Tab by mouth daily. Indications: MYOCARDIAL INFARCTION PREVENTION    insulin glargine (LANTUS) 100 unit/mL injection 35 Units by SubCUTAneous route nightly. pt takes 30 Units      No current facility-administered medications for this visit.         Allergies and Sensitivities:  Allergies   Allergen Reactions    Lasix [Furosemide] Other (comments)    Levofloxacin Rash    Penicillins Swelling       Family History:  Family History   Problem Relation Age of Onset    Heart Disease Mother     Heart Disease Father        Social History:  Social History     Tobacco Use    Smoking status: Former Smoker     Packs/day: 1.50     Years: 30.00     Pack years: 45.00     Types: Cigarettes     Last attempt to quit: 11/13/2015     Years since quitting: 3.8    Smokeless tobacco: Never Used Substance Use Topics    Alcohol use: No     Alcohol/week: 0.0 standard drinks    Drug use: No     He  reports that he quit smoking about 3 years ago. His smoking use included cigarettes. He has a 45.00 pack-year smoking history. He has never used smokeless tobacco.  He  reports that he does not drink alcohol. Review of Systems:  Cardiac symptoms as noted above in HPI. All others negative. Denies malaise, skin rash, blurring vision, photophobia, neck pain, hemoptysis, chronic cough, nausea, vomiting, hematuria, burning micturition, BRBPR, chronic headaches. Physical Exam:  BP Readings from Last 3 Encounters:   09/17/19 133/90   03/26/19 139/87   01/07/19 (!) 148/93         Pulse Readings from Last 3 Encounters:   09/17/19 82   03/26/19 84   01/07/19 91          Wt Readings from Last 3 Encounters:   09/17/19 220 lb (99.8 kg)   03/26/19 224 lb 3.2 oz (101.7 kg)   01/07/19 228 lb (103.4 kg)       Constitutional: Oriented to person, place, and time. HENT: Head: Normocephalic and atraumatic. Neck: No JVD present. Cardiovascular: Regular rhythm. No murmur, gallop or rubs appreciated  Lung: Breath sounds normal. No respiratory distress. No ronchi or rales appreciated  Abdominal: No tenderness. No rebound and no guarding. Musculoskeletal: There is no lower extremity edema. No cynosis.     Review of Data  LABS:   Lab Results   Component Value Date/Time    Sodium 140 01/07/2019 01:44 PM    Potassium 3.6 01/07/2019 01:44 PM    Chloride 106 01/07/2019 01:44 PM    CO2 26 01/07/2019 01:44 PM    Glucose 130 (H) 01/07/2019 01:44 PM    BUN 20 (H) 01/07/2019 01:44 PM    Creatinine 1.24 01/07/2019 01:44 PM     Lipids Latest Ref Rng & Units 1/7/2019 4/5/2018 10/26/2017 4/4/2017 9/23/2016   Chol, Total <200 MG/ 157 170 145 128   HDL 40 - 60 MG/DL 65(H) 61 57 59 59   LDL 0 - 100 MG/DL 75 76 89 65 50   Trig <150 MG/(H) 98 120 104 94   Chol/HDL Ratio 0 - 5.0   2.7 - - - -   Some recent data might be hidden Lab Results   Component Value Date/Time    ALT (SGPT) 40 01/07/2019 01:44 PM     Lab Results   Component Value Date/Time    Hemoglobin A1c 6.5 (H) 01/07/2019 01:44 PM       EKG    ECHO (11/15)  Left ventricle: The ventricle was dilated. Systolic function was moderately reduced. Ejection fraction was estimated in the range of 30 %  to 35 %. There was mild diffuse hypokinesis. There was severe hypokinesis of inferior/inferolateral wall. Wall thickness was mildly increased. Doppler parameters were consistent with abnormal left ventricular relaxation (grade 1 diastolic dysfunction). Right ventricle: Systolic function was normal.  Aortic valve: There was no stenosis. ECHO (04/16)  Left ventricle: Size was at the upper limits of normal. Systolic function  was moderately to markedly reduced by visual assessment. Ejection fraction  was estimated to be 30 %. There was severe hypokinesis of the basal-mid  inferior and basal-mid inferolateral wall(s). Wall thickness was mildly  increased. Doppler parameters were consistent with abnormal left  ventricular relaxation (grade 1 diastolic dysfunction). Right ventricle: Systolic function was normal. Estimated peak pressure was in the range of 30 mmHg to 35 mmHg. Left atrium: The atrium was mildly dilated. Right atrium: The atrium was mildly dilated. Mitral valve: There was mild annular calcification. There was mild regurgitation. Aortic valve: There was no stenosis. Tricuspid valve: There was trivial regurgitation. CATHETERIZATION (11/15)  - LVEF estimated to be 30% with diffuse hypokinesis. No significant mitral regurgitation was noted. - LM: calcification, 20% distal   - LAD had diffuse 50% stenosis in its midportion of the vessel with only luminal irregularities in the proximal LAD. There was a  discrete 50% distal vessel stenosis, as well.  The diagonal branches had mild disease.   - LCx: Circumflex had an ostial 50-60% stenosis,   - OM1: 100% thrombotic occlusion proximally. There was very faint collateral filling from the RCA. - RCA: Prox  50% diffuse, 40% distal disease. The right PDA had an 80% ostial stenosis and then a 90% mid vessel stenosis. The right  posterolateral branch had a 90% stenosis in a sub branch. 4. PCI: 100% OM-1 stenosis reduced to 0%. drug-eluting stent, Xience 2.5 x 23 mm     IMPRESSION & PLAN:  Mr. Myriam Davenport is a 71 y.o. male with cardiomyopathy, coronary artery disease, hypertension, hyperlipidemia. Coronary artery disease:  Mr. Myriam Davenport had an OM1 stent in November, 2015. No angina. No use of S/L NTG since last visit. Continue with medical management, including aspirin lifelong. He is also on Coreg and Lipitor. Cardiomyopathy:    His initial ejection fraction in a setting of NSTEMI was 30-35% in November 2015. A repeat EF remains 30% in April 2016, despite being on appropriate medical management. S/P Bi-V AICD placement by  in 07/16. Continue current medication no ICD shock. He is also on Bumex. NO fluid overload on exam today    Hypertension:  BP is 133/90 mm hg. Continue same meds. Hyperlipidemia:  He is on Atorvastatin 80 mg daily. Continue same. Last LDL 89. Importance of diet and exercise was discussed with patient. This plan was discussed with patient who is in agreement. Thank you for allowing me to participate in patient care. Please feel free to call me if you have any question or concern. Laurie Champion MD  Please note: This document has been produced using voice recognition software. Unrecognized errors in transcription may be present.

## 2019-10-04 ENCOUNTER — OFFICE VISIT (OUTPATIENT)
Dept: FAMILY MEDICINE CLINIC | Age: 64
End: 2019-10-04

## 2019-10-04 VITALS
WEIGHT: 224 LBS | HEART RATE: 86 BPM | TEMPERATURE: 97.3 F | BODY MASS INDEX: 31.36 KG/M2 | HEIGHT: 71 IN | RESPIRATION RATE: 16 BRPM | SYSTOLIC BLOOD PRESSURE: 118 MMHG | DIASTOLIC BLOOD PRESSURE: 75 MMHG | OXYGEN SATURATION: 95 %

## 2019-10-04 DIAGNOSIS — I10 ESSENTIAL HYPERTENSION: ICD-10-CM

## 2019-10-04 DIAGNOSIS — J30.1 CHRONIC SEASONAL ALLERGIC RHINITIS DUE TO POLLEN: ICD-10-CM

## 2019-10-04 DIAGNOSIS — Z71.89 ADVANCE CARE PLANNING: ICD-10-CM

## 2019-10-04 DIAGNOSIS — E78.00 PURE HYPERCHOLESTEROLEMIA: ICD-10-CM

## 2019-10-04 DIAGNOSIS — R51.9 FRONTAL HEADACHE: ICD-10-CM

## 2019-10-04 DIAGNOSIS — F17.210 CIGARETTE NICOTINE DEPENDENCE WITHOUT COMPLICATION: ICD-10-CM

## 2019-10-04 DIAGNOSIS — E11.65 TYPE 2 DIABETES MELLITUS WITH HYPERGLYCEMIA, WITH LONG-TERM CURRENT USE OF INSULIN (HCC): ICD-10-CM

## 2019-10-04 DIAGNOSIS — H90.0 CONDUCTIVE HEARING LOSS, BILATERAL: ICD-10-CM

## 2019-10-04 DIAGNOSIS — Z23 NEED FOR VACCINATION FOR PNEUMOCOCCUS: ICD-10-CM

## 2019-10-04 DIAGNOSIS — Z79.4 TYPE 2 DIABETES MELLITUS WITH HYPERGLYCEMIA, WITH LONG-TERM CURRENT USE OF INSULIN (HCC): ICD-10-CM

## 2019-10-04 DIAGNOSIS — Z00.00 MEDICARE ANNUAL WELLNESS VISIT, INITIAL: Primary | ICD-10-CM

## 2019-10-04 DIAGNOSIS — J43.9 PULMONARY EMPHYSEMA, UNSPECIFIED EMPHYSEMA TYPE (HCC): ICD-10-CM

## 2019-10-04 LAB — HBA1C MFR BLD HPLC: 6.6 %

## 2019-10-04 NOTE — PROGRESS NOTES
(AWV) The Initial Medicare Annual Wellness Exam PROGRESS NOTE    This is an Initial Medicare Annual Wellness Exam     I have reviewed the patient's medical history in detail and updated the computerized patient record. Linda Florentino is a 71 y.o.  male and presents for an annual wellness exam     ROS   General ROS: negative for - chills or fever  Psychological ROS: negative for - anxiety or depression  Respiratory ROS: no cough, shortness of breath, or wheezing  Cardiovascular ROS: no chest pain or dyspnea on exertion  Gastrointestinal ROS: no abdominal pain, change in bowel habits, or black or bloody stools  Genito-Urinary ROS: no dysuria, trouble voiding, or hematuria  Musculoskeletal ROS: negative for - joint pain  Neurological ROS: positive for - numbness/tingling  Dermatological ROS: positive for - dry skin  All other systems reviewed and are negative. History     Past Medical History:   Diagnosis Date    Biventricular ICD (implantable cardioverter-defibrillator) in place 07/16    [employer]    CAD (coronary artery disease)     Cardiomyopathy, ischemic     EF 30% (04/16) 30-35% (11/15)    Diabetes [facility]     DVT (deep venous thrombosis) ([facility] 08/16    L axillary vein after PPM insertion    HLD (hyperlipidemia)     Hypertension     NSTEMI (non-ST elevated myocardial infarction) ([facility]     S/P OM1 2.5 X 23 mm XIENCE (11/15)    Pulmonary emphysema ([facility]     Stroke [facility])       Past Surgical History:   Procedure Laterality Date    COLONOSCOPY N/A 12/14/2017    COLONOSCOPY performed by Denise Thurston MD at [address] [place] [address] HX PACEMAKER PLACEMENT      VASCULAR SURGERY PROCEDURE UNLIST      Stent placed right thigh     Current Outpatient Medications   Medication Sig Dispense Refill    nitroglycerin (NITROSTAT) 0.4 mg SL tablet 0.4 mg by SubLINGual route every five (5) minutes as needed for Chest Pain. Up to 3 doses.  loratadine (CLARITIN) 10 mg tablet Take 10 mg by mouth.       cetaphil (CETAPHIL) topical cream Apply  to affected area as needed (dry skin). 453 g 1    sildenafil citrate (VIAGRA) 50 mg tablet Take 1 Tab by mouth as needed. 10 Tab 2    cetirizine (ZYRTEC) 10 mg tablet Take 1 Tab by mouth daily as needed for Allergies. 30 Tab 2    hydrALAZINE (APRESOLINE) 25 mg tablet Take 1 Tab by mouth three (3) times daily. 90 Tab 3    pantoprazole (PROTONIX) 40 mg tablet Take 1 Tab by mouth daily. 30 Tab 1    losartan (COZAAR) 100 mg tablet Take 1 Tab by mouth daily. 90 Tab 3    hydroCHLOROthiazide (HYDRODIURIL) 25 mg tablet Take 1 Tab by mouth daily. 90 Tab 3    atorvastatin (LIPITOR) 80 mg tablet Take 1 Tab by mouth nightly. 30 Tab 5    carvedilol (COREG) 25 mg tablet Take 0.5 Tabs by mouth two (2) times daily (with meals). 1/2 tab BID 30 Tab 3    NIFEdipine ER (ADALAT CC) 60 mg ER tablet Take 1 Tab by mouth daily. 90 Tab 3    budesonide-formoterol (SYMBICORT) 160-4.5 mcg/actuation HFAA Take 2 Puffs by inhalation two (2) times a day. 1 Inhaler 3    Cholecalciferol, Vitamin D3, 50,000 unit cap Take  by mouth.  albuterol (PROVENTIL HFA, VENTOLIN HFA, PROAIR HFA) 90 mcg/actuation inhaler Take 2 Puffs by inhalation every six (6) hours as needed for Wheezing. 1 Inhaler 3    acetaminophen (TYLENOL EXTRA STRENGTH) 500 mg tablet Take 2 Tabs by mouth every six (6) hours as needed for Pain. 50 Tab 0    mometasone (NASONEX) 50 mcg/actuation nasal spray 2 Sprays by Both Nostrils route daily. 2 Container 3    aspirin 81 mg chewable tablet Take 1 Tab by mouth daily. Indications: MYOCARDIAL INFARCTION PREVENTION 100 Tab 5    insulin glargine (LANTUS) 100 unit/mL injection 35 Units by SubCUTAneous route nightly.  pt takes 30 Units        Allergies   Allergen Reactions    Lasix [Furosemide] Other (comments)    Levofloxacin Rash    Penicillins Swelling     Family History   Problem Relation Age of Onset    Heart Disease Mother     Heart Disease Father      Social History     Tobacco Use    Smoking status: Former Smoker     Packs/day: 1.50     Years: 30.00     Pack years: 45.00     Types: Cigarettes     Last attempt to quit: 11/13/2015     Years since quitting: 3.8    Smokeless tobacco: Never Used   Substance Use Topics    Alcohol use: No     Alcohol/week: 0.0 standard drinks     Patient Active Problem List   Diagnosis Code    Hypertension I10    Type II or unspecified type diabetes mellitus without mention of complication, not stated as uncontrolled E11.9    Acute lacunar stroke ([facility] I63.81    HLD (hyperlipidemia) E78.5    ACS [facility] coronary syndrome) [facility] I24.9    Elevated troponin R79.89    Hearing impairment H91.90    Coronary artery disease involving native coronary artery without angina pectoris I25.10    AICD (automatic cardioverter/defibrillator) present Z95.810    Emphysema of lung ([facility]) J43.9    Ex-smoker Z87.891    Restrictive ventilatory defect R94.2    Obesity (BMI 30-39. 9) E66.9    Chronic combined systolic and diastolic heart failure ([facility]) I50.42    Deep venous thrombosis ([facility]) I82.409    Hypercholesteremia E78.00    Type 2 diabetes mellitus with diabetic neuropathy, with long-term current use of insulin ([facility]) E11.40, Z79.4    Deep vein thrombosis (DVT) of left upper extremity ([facility]) I82.622    PAD (peripheral artery disease) ([facility]) I73.9    Essential hypertension I10    Coronary artery disease involving native coronary artery of native heart without angina pectoris I25.10    Type 2 diabetes with nephropathy ([facility]) E11.21    Advance care planning Z71.89       Health Maintenance History  Immunizations reviewed, dtap up to date , pneumovax up to date, flu due, zoster due  Colonoscopy: up to date,   Eye exam: due    Depression Risk Factor Screening:      Patient Health Questionnaire (PHQ-2)   Over the last 2 weeks, how often have you been bothered by any of the following problems? · Little interest or pleasure in doing things? · Not at all.  [0]  · Feeling down, depressed, or hopeless? · Not at all. [0]    Total Score: 0/6  PHQ-2 Assessment Scoring:   A score of 2 or more requires further screening with the PHQ-9    Alcohol Risk Factor Screening:     Men: On any occasion during the past 3 months, have you had more than 4 drinks containing alcohol?  no  Do you average more than 14 drinks per week?  no    Functional Ability and Level of Safety:     Hearing Loss    needs further evaluation. Activities of Daily Living   Self-care. Requires assistance with: no ADLs    Fall Risk   No fall risk factors    Abuse Screen   Patient is not abused    Examination   Physical Examination  Vitals:    10/04/19 1152   BP: 118/75   Pulse: 86   Resp: 16   Temp: 97.3 °F (36.3 °C)   TempSrc: Oral   SpO2: 95%   Weight: 224 lb (101.6 kg)   Height: 5' 11\" (1.803 m)   PainSc:   0 - No pain      Body mass index is 31.24 kg/m². Evaluation of Cognitive Function:  Mood/affect:good mood with appropriate affect  Appearance: well kempt  Family member/caregiver input: n/a    alert, well appearing, and in no distress, oriented to person, place, and time and obese    Patient Care Team:  Farida Martel MD as PCP - General (Family Practice)  Jewel Johnson MD (Cardiology)  Quinten Martínez NP (Nurse Practitioner)    End-of-life planning  Advanced Directive in the case than an injury or illness causes the patient to be unable to make health care decisions    Health Care Directive or Living Will: yes    Advice/Referrals/Counselling/Plan:   Education and counseling provided:  Are appropriate based on today's review and evaluation  End-of-Life planning (with patient's consent)  Influenza Vaccine  Diabetes screening test  shingles  Include in education list (weight loss, physical activity, smoking cessation, fall prevention, and nutrition)    ICD-10-CM ICD-9-CM    1. Medicare annual wellness visit, initial Z00.00 V70.0 [id number]   2. Advance care planning Z71.89 V65.49    3.  Type 2 diabetes mellitus with hyperglycemia, with long-term current use of insulin ([facility]) E11.65 250.00 AMB POC HEMOGLOBIN A1C    Z79.4 790.29  DIABETES FOOT EXAM     V58.67    4. Need for vaccination for pneumococcus Z23 V03.82 ADMIN PNEUMOCOCCAL VACCINE      PNEUMOCOCCAL CONJ VACCINE 13 VALENT IM   5. Frontal headache R51 784.0 CT HEAD WO CONT      CANCELED: CT HEAD W WO CONT   6. Essential hypertension I10 401.9    7. Pure hypercholesterolemia E78.00 272.0    8. Conductive hearing loss, bilateral H90.0 389.06    9. Cigarette nicotine dependence without complication J95.837 431.4    10. Pulmonary emphysema, unspecified emphysema type ([facility]) J43.9 492.8    11. Chronic seasonal allergic rhinitis due to pollen J30.1 477.0    . Brief written plan, checklist    I have discussed the diagnosis with the patient and the intended plan as seen in the above orders. The patient has received an after-visit summary and questions were answered concerning future plans. I have discussed medication side effects and warnings with the patient as well. I have reviewed the plan of care with the patient, accepted their input and they are in agreement with the treatment goals. ____________________________________________________________    Problem Assessment    for treatment of   Chief Complaint   Patient presents with    Annual Wellness Visit         SUBJECTIVE    Well Adult Physical   Patient here for a comprehensive physical exam.The patient reports problems - diabetes, hypertension  Do you take any herbs or supplements that were not prescribed by a doctor? no Are you taking calcium supplements? no Are you taking aspirin daily? yes  Diabetes Mellitus  Patient presents for evaluation of for follow up on diabetes. Onset of symptoms was 17 years ago, problem is longstanding. He describes symptoms as paresthesias of the feet: mild. Course to date has been well controlled.  Patient denies polyuria, polydipsia, visual disturbances and chest pain. Home sugars are running: BGs range between 80 and 90 Previous visits for this problem: multiple, this is a diagnosis of longstanding. Last seen 2 months ago by the patient's PCP. Evaluation to date has been see lab results. Treatment goals: Glycemic control: excellent  BP control: acceptable Treatment to date has been decreased dose of insulin: effective. Cardiovascular Review:  The patient has hypertension, hyperlipidemia, coronary artery disease, obesity and pacemaker. Diet and Lifestyle: generally follows a low fat low cholesterol diet, generally follows a low sodium diet, follows a diabetic diet regularly, exercises sporadically, smoker 1 pack per day  Home BP Monitoring: is not measured at home. Pertinent ROS: taking medications as instructed, no medication side effects noted, no TIA's, no chest pain on exertion, no dyspnea on exertion, no swelling of ankles.        Visit Vitals  /75 (BP 1 Location: Right arm, BP Patient Position: Sitting)   Pulse 86   Temp 97.3 °F (36.3 °C) (Oral)   Resp 16   Ht 5' 11\" (1.803 m)   Wt 224 lb (101.6 kg)   SpO2 95%   BMI 31.24 kg/m²     General:  Alert, cooperative, no distress, appears stated age. Head:  Normocephalic, without obvious abnormality, atraumatic. Eyes:  Conjunctivae/corneas clear. PERRL, EOMs intact. Ears:  Normal TMs and external ear canals both ears. Nose: Nares normal. Septum midline. Mucosa normal. No drainage or sinus tenderness. Throat: Lips, mucosa, and tongue normal. Teeth and gums normal.   Neck: Supple, symmetrical, trachea midline, no adenopathy, thyroid: no enlargement/tenderness/nodules   Back:   Symmetric, no curvature. ROM normal.    Lungs:   Clear to auscultation bilaterally. Chest wall:  No tenderness or deformity. Heart:  Regular rate and rhythm, S1, S2 normal, no murmur, click, rub or gallop. Abdomen:   Soft, non-tender. Bowel sounds normal. No masses,  No organomegaly.    Genitalia:  deferred Rectal:  deferred   Extremities: Extremities normal, atraumatic, no cyanosis or edema. Pulses: 2+ and symmetric all extremities. Skin: Skin color, texture, turgor normal. No rashes or lesions   Lymph nodes: Cervical, supraclavicular, and axillary nodes normal.   Neurologic: CNII-XII intact. Normal strength, sensation and reflexes throughout. Diabetic foot exam:     Left Foot:   Visual Exam: normal    Pulse DP: 2+ (normal)   Filament test: reduced sensation       Right Foot:   Visual Exam: normal    Pulse DP: 2+ (normal)   Filament test: reduced sensation     LABS   hgb a1c 6.6    Assessment/Plan:    1. Advance care planning  See ACP    2. Medicare annual wellness visit, initial  Reviewed preventive recommendations   - [address] [place] [address] [employer]    3. Type 2 diabetes mellitus with hyperglycemia, with long-term current use of insulin ([facility])  Goal hgb a1c <7  - AMB POC HEMOGLOBIN A1C  -  DIABETES FOOT EXAM    4. Need for vaccination for pneumococcus    - ADMIN PNEUMOCOCCAL VACCINE  - PNEUMOCOCCAL CONJ VACCINE 13 VALENT IM    5. Frontal headache  Imaging ordered due to age at onset of headaches  - CT HEAD WO CONT; Future    6. Essential hypertension  Goal <130/80    7. Pure hypercholesterolemia      8. Conductive hearing loss, bilateral      9. Cigarette nicotine dependence without complication  Encourage smoking cessation    10. Pulmonary emphysema, unspecified emphysema type ([address]  Continue inhaled therapy    11.  Chronic seasonal allergic rhinitis due to pollen  Use medications as prescribed    Lab review: labs reviewed, I note that glycosylated hemoglobin mildly abnormal but acceptable

## 2019-10-04 NOTE — PROGRESS NOTES
1. Have you been to the ER, urgent care clinic since your last visit? Hospitalized since your last visit? No    2. Have you seen or consulted any other health care providers outside of the [address] [place] [address] since your last visit? Include any pap smears or colon screening.  No     Patient presents in office today for medicare wellness exam      Patient concerns: rash on face

## 2019-10-04 NOTE — ACP (ADVANCE CARE PLANNING)
Advance Care Planning (ACP) Provider Note - Comprehensive      Date of ACP Conversation: 10/4/2019  Persons included in Conversation:  patient  Length of ACP Conversation in minutes:  <16 minutes (Non-Billable)     Authorized Decision Maker (if patient is incapable of making informed decisions): This person is:  pts sister                                                       General ACP for ALL Patients with Decision Making Capacity:   Importance of advance care planning, including choosing a healthcare agent to communicate patient's healthcare decisions if patient lost the ability to make decisions, such as after a sudden illness or accident  Understanding of the healthcare agent role was assessed and information provided     Review of Existing Advance Directive:  Does this advance directive still reflect your preferences?   Yes (Provide new form/Refer for assistance in updating)     For Serious or Chronic Illness:  Understanding of medical condition    Understanding of CPR, goals and expected outcomes, benefits and burdens discussed.     Interventions Provided:  Reviewed existing Advance Directive

## 2019-10-17 ENCOUNTER — HOSPITAL ENCOUNTER (OUTPATIENT)
Dept: CT IMAGING | Age: 64
Discharge: HOME | End: 2019-10-17
Attending: FAMILY MEDICINE
Payer: MEDICARE

## 2019-10-17 DIAGNOSIS — R51.9 FRONTAL HEADACHE: ICD-10-CM

## 2019-10-17 PROCEDURE — 70450 CT HEAD/BRAIN W/O DYE: CPT

## 2019-10-23 ENCOUNTER — CLINICAL SUPPORT (OUTPATIENT)
Dept: CARDIOLOGY CLINIC | Age: 64
End: 2019-10-23

## 2019-10-23 ENCOUNTER — DOCUMENTATION ONLY (OUTPATIENT)
Dept: CARDIOLOGY CLINIC | Age: 64
End: 2019-10-23

## 2019-10-23 DIAGNOSIS — I42.9 CARDIOMYOPATHY, UNSPECIFIED TYPE (HCC): ICD-10-CM

## 2019-10-23 DIAGNOSIS — I50.42 CHRONIC COMBINED SYSTOLIC AND DIASTOLIC HEART FAILURE (HCC): ICD-10-CM

## 2019-10-23 DIAGNOSIS — Z95.810 AICD (AUTOMATIC CARDIOVERTER/DEFIBRILLATOR) PRESENT: Primary | ICD-10-CM

## 2019-10-23 NOTE — PROGRESS NOTES
Patient in office for device check, has several episodes of afib on device, seems to be new diagnosis. It's not listed in chart. Will forward message to let Dr. Geovanni Frances know.  Patient may need to be seen back in office for follow up

## 2020-03-09 ENCOUNTER — HOSPITAL ENCOUNTER (OUTPATIENT)
Dept: LAB | Age: 65
Discharge: HOME | End: 2020-03-09
Payer: MEDICARE

## 2020-03-09 ENCOUNTER — OFFICE VISIT (OUTPATIENT)
Dept: FAMILY MEDICINE CLINIC | Age: 65
End: 2020-03-09

## 2020-03-09 VITALS
OXYGEN SATURATION: 98 % | HEART RATE: 86 BPM | HEIGHT: 71 IN | DIASTOLIC BLOOD PRESSURE: 91 MMHG | TEMPERATURE: 97.4 F | SYSTOLIC BLOOD PRESSURE: 143 MMHG | RESPIRATION RATE: 16 BRPM | BODY MASS INDEX: 31.44 KG/M2 | WEIGHT: 224.6 LBS

## 2020-03-09 DIAGNOSIS — I10 ESSENTIAL HYPERTENSION: ICD-10-CM

## 2020-03-09 DIAGNOSIS — E78.00 PURE HYPERCHOLESTEROLEMIA: ICD-10-CM

## 2020-03-09 DIAGNOSIS — E11.65 TYPE 2 DIABETES MELLITUS WITH HYPERGLYCEMIA, WITH LONG-TERM CURRENT USE OF INSULIN (HCC): Primary | ICD-10-CM

## 2020-03-09 DIAGNOSIS — Z79.4 TYPE 2 DIABETES MELLITUS WITH HYPERGLYCEMIA, WITH LONG-TERM CURRENT USE OF INSULIN (HCC): Primary | ICD-10-CM

## 2020-03-09 DIAGNOSIS — Z79.4 TYPE 2 DIABETES MELLITUS WITH HYPERGLYCEMIA, WITH LONG-TERM CURRENT USE OF INSULIN (HCC): ICD-10-CM

## 2020-03-09 DIAGNOSIS — F17.210 CIGARETTE NICOTINE DEPENDENCE WITHOUT COMPLICATION: ICD-10-CM

## 2020-03-09 DIAGNOSIS — J43.9 PULMONARY EMPHYSEMA, UNSPECIFIED EMPHYSEMA TYPE (HCC): ICD-10-CM

## 2020-03-09 DIAGNOSIS — E11.65 TYPE 2 DIABETES MELLITUS WITH HYPERGLYCEMIA, WITH LONG-TERM CURRENT USE OF INSULIN (HCC): ICD-10-CM

## 2020-03-09 LAB
CHOLEST SERPL-MCNC: 169 MG/DL
HBA1C MFR BLD HPLC: 6.6 %
HDLC SERPL-MCNC: 71 MG/DL (ref 40–60)
HDLC SERPL: 2.4 {RATIO} (ref 0–5)
LDLC SERPL CALC-MCNC: 83.2 MG/DL (ref 0–100)
LIPID PROFILE,FLP: ABNORMAL
TRIGL SERPL-MCNC: 74 MG/DL (ref ?–150)
VLDLC SERPL CALC-MCNC: 14.8 MG/DL

## 2020-03-09 PROCEDURE — 36415 COLL VENOUS BLD VENIPUNCTURE: CPT

## 2020-03-09 PROCEDURE — 80061 LIPID PANEL: CPT

## 2020-03-09 NOTE — PROGRESS NOTES
Areli Crouch is a 76 y.o.  male and presents with    Chief Complaint   Patient presents with    Diabetes    Results     Imaging results    Hypertension           Subjective:    Diabetes Mellitus:  He has diabetes mellitus, and  hypertension, hyperlipidemia and obesity. Diabetic ROS - medication compliance: compliant all of the time, diabetic diet compliance: compliant most of the time. Lab review: orders written for new lab studies as appropriate; see orders. Cardiovascular Review:  The patient has hypertension, hyperlipidemia, coronary artery disease, obesity and pacemaker. Diet and Lifestyle: generally follows a low fat low cholesterol diet, generally follows a low sodium diet, follows a diabetic diet regularly, exercises sporadically, smoker 1/2 pack per day  Home BP Monitoring: is not measured at home. Pertinent ROS: taking medications as instructed, no medication side effects noted, no TIA's, no chest pain on exertion, no dyspnea on exertion, no swelling of ankles.     ROS   General ROS: negative for - chills or fever  Psychological ROS: negative for - anxiety or depression  Respiratory ROS: no cough, shortness of breath, or wheezing  Cardiovascular ROS: no chest pain or dyspnea on exertion  Gastrointestinal ROS: no abdominal pain, change in bowel habits, or black or bloody stools  Genito-Urinary ROS: no dysuria, trouble voiding, or hematuria  Musculoskeletal ROS: negative for - joint pain  Neurological ROS: positive for - numbness/tingling  Dermatological ROS: positive for - dry skin  All other systems reviewed and are negative. All other systems reviewed and are negative.     Objective:  Vitals:    03/09/20 1359   BP: (!) 143/91   Pulse: 86   Resp: 16   Temp: 97.4 °F (36.3 °C)   TempSrc: Oral   SpO2: 98%   Weight: 224 lb 9.6 oz (101.9 kg)   Height: 5' 11\" (1.803 m)   PainSc:   0 - No pain     alert, well appearing, and in no distress, oriented to person, place, and time and obese  Mental status - normal mood, behavior, speech, dress, motor activity, and thought processes  Chest - clear to auscultation, no wheezes, rales or rhonchi, symmetric air entry  Heart - normal rate, regular rhythm, normal S1, S2, no murmurs, rubs, clicks or gallops  Neurological - cranial nerves II through XII intact    LABS   hgb a1c 6.6    Assessment/Plan:    1. Type 2 diabetes mellitus with hyperglycemia, with long-term current use of insulin ([facility])  Goal hgb a1c <7; controlled;  - AMB POC HEMOGLOBIN A1C  - LIPID PANEL; Future  - MICROALBUMIN, UR, RAND W/ MICROALB/CREAT RATIO; Future    2. Essential hypertension  Goal <130/80    3. Cigarette nicotine dependence without complication  Counseled for >3 minutes on smoking cessation; he has medication at home to assist in smoking cessation. Patches and gum  - [place] SMOKING AND TOBACCO USE CESSATION 3 - 10 MINUTES    4. Pure hypercholesterolemia  Continue statin therapy    5. Pulmonary emphysema, unspecified emphysema type ([facility]  Continue inhaled therapy      Lab review: labs reviewed, I note that glycosylated hemoglobin mildly abnormal but acceptable, orders written for new lab studies as appropriate; see orders      I have discussed the diagnosis with the patient and the intended plan as seen in the above orders. The patient has received an after-visit summary and questions were answered concerning future plans. I have discussed medication side effects and warnings with the patient as well. I have reviewed the plan of care with the patient, accepted their input and they are in agreement with the treatment goals.

## 2020-03-09 NOTE — PROGRESS NOTES
Tomaas Franks presents today for No chief complaint on file. Is someone accompanying this pt? No    Is the patient using any DME equipment during OV? No    Depression Screening:  3 most recent PHQ Screens 3/9/2020   Little interest or pleasure in doing things Not at all   Feeling down, depressed, irritable, or hopeless Not at all   Total Score PHQ 2 0       Learning Assessment:  Learning Assessment 3/16/2017   PRIMARY LEARNER Patient   HIGHEST LEVEL OF EDUCATION - PRIMARY LEARNER  DID NOT GRADUATE HIGH SCHOOL   BARRIERS PRIMARY LEARNER NONE   CO-LEARNER CAREGIVER No   PRIMARY LANGUAGE ENGLISH   LEARNER PREFERENCE PRIMARY DEMONSTRATION   ANSWERED BY patient   RELATIONSHIP SELF       Abuse Screening:  Abuse Screening Questionnaire 5/10/2016   Do you ever feel afraid of your partner? N   Are you in a relationship with someone who physically or mentally threatens you? N   Is it safe for you to go home? Y       Fall Risk  Fall Risk Assessment, last 12 mths 3/9/2020   Able to walk? Yes   Fall in past 12 months? No       Health Maintenance reviewed and discussed and ordered per Provider. Health Maintenance Due   Topic Date Due    Eye Exam Retinal or Dilated  01/29/1965    Shingrix Vaccine Age 50> (1 of 2) 01/29/2004    GLAUCOMA SCREENING Q2Y  01/29/2019    MICROALBUMIN Q1  04/05/2019    Lipid Screen  01/07/2020   . Coordination of Care:  1. Have you been to the ER, urgent care clinic since your last visit? Hospitalized since your last visit? No    2. Have you seen or consulted any other health care providers outside of the [address] [place] [address] since your last visit? Include any pap smears or colon screening.  No      Last  Checked N/A  Last UDS Checked N/A  Last Pain contract signed: N/A

## 2020-03-18 ENCOUNTER — OFFICE VISIT (OUTPATIENT)
Dept: CARDIOLOGY CLINIC | Age: 65
End: 2020-03-18

## 2020-03-18 VITALS
OXYGEN SATURATION: 97 % | BODY MASS INDEX: 32.06 KG/M2 | HEIGHT: 71 IN | HEART RATE: 77 BPM | SYSTOLIC BLOOD PRESSURE: 129 MMHG | WEIGHT: 229 LBS | DIASTOLIC BLOOD PRESSURE: 84 MMHG

## 2020-03-18 DIAGNOSIS — I25.10 CORONARY ARTERY DISEASE DUE TO LIPID RICH PLAQUE: Primary | ICD-10-CM

## 2020-03-18 DIAGNOSIS — I25.83 CORONARY ARTERY DISEASE DUE TO LIPID RICH PLAQUE: Primary | ICD-10-CM

## 2020-03-18 DIAGNOSIS — I42.0 DILATED CARDIOMYOPATHY (HCC): ICD-10-CM

## 2020-03-18 DIAGNOSIS — I10 ESSENTIAL HYPERTENSION WITH GOAL BLOOD PRESSURE LESS THAN 140/90: ICD-10-CM

## 2020-03-18 RX ORDER — FLUTICASONE PROPIONATE 50 MCG
2 SPRAY, SUSPENSION (ML) NASAL DAILY
COMMUNITY

## 2020-03-18 NOTE — PROGRESS NOTES
[facility]    Mr. Anshu Rubalcava is a 76 y.o. male with a history of coronary artery disease, status post OM1 stent in November, 2015, diabetes, hypertension, stroke, hyperlipidemia, cardiomyopathy s/p BiV ICD in 07/16    Mr. Anshu Rubalcava is here today for follow up appointment. Denies any resting or exertional chest pain or chest pressure to suggest angina or any dyspnea to suggest heart failure. No presyncope or syncope  Denies any PND or LE edema. Taking all medications regularly. Past Medical History:   Diagnosis Date    Biventricular ICD (implantable cardioverter-defibrillator) in place 07/16    [employer]    CAD (coronary artery disease)     Cardiomyopathy, ischemic     EF 30% (04/16) 30-35% (11/15)    Diabetes ([address]     DVT (deep venous thrombosis) ([facility] 08/16    L axillary vein after PPM insertion    HLD (hyperlipidemia)     Hypertension     NSTEMI (non-ST elevated myocardial infarction) ([address]     S/P OM1 2.5 X 23 mm XIENCE (11/15)    Pulmonary emphysema ([facility]     Stroke [facility])          Past Surgical History:   Procedure Laterality Date    COLONOSCOPY N/A 12/14/2017    COLONOSCOPY performed by Mine Morales MD at [facility]    HX PACEMAKER PLACEMENT      VASCULAR SURGERY PROCEDURE UNLIST      Stent placed right thigh       Current Outpatient Medications   Medication Sig    fluticasone propionate (Flonase Allergy Relief) 50 mcg/actuation nasal spray 2 Sprays by Both Nostrils route daily.  nitroglycerin (NITROSTAT) 0.4 mg SL tablet 0.4 mg by SubLINGual route every five (5) minutes as needed for Chest Pain. Up to 3 doses.  cetaphil (CETAPHIL) topical cream Apply  to affected area as needed (dry skin).  cetirizine (ZYRTEC) 10 mg tablet Take 1 Tab by mouth daily as needed for Allergies.  hydrALAZINE (APRESOLINE) 25 mg tablet Take 1 Tab by mouth three (3) times daily.     pantoprazole (PROTONIX) 40 mg tablet Take 1 Tab by mouth daily.  losartan (COZAAR) 100 mg tablet Take 1 Tab by mouth daily.  hydroCHLOROthiazide (HYDRODIURIL) 25 mg tablet Take 1 Tab by mouth daily.  atorvastatin (LIPITOR) 80 mg tablet Take 1 Tab by mouth nightly.  carvedilol (COREG) 25 mg tablet Take 0.5 Tabs by mouth two (2) times daily (with meals). 1/2 tab BID    NIFEdipine ER (ADALAT CC) 60 mg ER tablet Take 1 Tab by mouth daily.  albuterol (PROVENTIL HFA, VENTOLIN HFA, PROAIR HFA) 90 mcg/actuation inhaler Take 2 Puffs by inhalation every six (6) hours as needed for Wheezing.  acetaminophen (TYLENOL EXTRA STRENGTH) 500 mg tablet Take 2 Tabs by mouth every six (6) hours as needed for Pain.  aspirin 81 mg chewable tablet Take 1 Tab by mouth daily. Indications: MYOCARDIAL INFARCTION PREVENTION    insulin glargine (LANTUS) 100 unit/mL injection 35 Units by SubCUTAneous route nightly. pt takes 30 Units      No current facility-administered medications for this visit. Allergies and Sensitivities:  Allergies   Allergen Reactions    Lasix [Furosemide] Other (comments)    Levofloxacin Rash    Penicillins Swelling       Family History:  Family History   Problem Relation Age of Onset    Heart Disease Mother     Heart Disease Father        Social History:  Social History     Tobacco Use    Smoking status: Former Smoker     Packs/day: 1.50     Years: 30.00     Pack years: 45.00     Types: Cigarettes     Last attempt to quit: 2015     Years since quittin.3    Smokeless tobacco: Never Used   Substance Use Topics    Alcohol use: No     Alcohol/week: 0.0 standard drinks    Drug use: No     He  reports that he quit smoking about 4 years ago. His smoking use included cigarettes. He has a 45.00 pack-year smoking history. He has never used smokeless tobacco.  He  reports no history of alcohol use. Review of Systems:  Cardiac symptoms as noted above in HPI. All others negative.   Denies malaise, skin rash, blurring vision, photophobia, neck pain, hemoptysis, chronic cough, nausea, vomiting, hematuria, burning micturition, BRBPR, chronic headaches. Physical Exam:  BP Readings from Last 3 Encounters:   03/18/20 129/84   03/09/20 (!) 143/91   10/04/19 118/75         Pulse Readings from Last 3 Encounters:   03/18/20 77   03/09/20 86   10/04/19 86          Wt Readings from Last 3 Encounters:   03/18/20 229 lb (103.9 kg)   03/09/20 224 lb 9.6 oz (101.9 kg)   10/04/19 224 lb (101.6 kg)       Constitutional: Oriented to person, place, and time. HENT: Head: Normocephalic and atraumatic. Neck: No JVD present. Cardiovascular: Regular rhythm. No murmur, gallop or rubs appreciated  Lung: Breath sounds normal. No respiratory distress. No ronchi or rales appreciated  Abdominal: No tenderness. No rebound and no guarding. Musculoskeletal: There is no lower extremity edema. No cynosis. Review of Data  LABS:   Lab Results   Component Value Date/Time    Sodium 140 01/07/2019 01:44 PM    Potassium 3.6 01/07/2019 01:44 PM    Chloride 106 01/07/2019 01:44 PM    CO2 26 01/07/2019 01:44 PM    Glucose 130 (H) 01/07/2019 01:44 PM    BUN 20 (H) 01/07/2019 01:44 PM    Creatinine 1.24 01/07/2019 01:44 PM     Lipids Latest Ref Rng & Units 3/9/2020 1/7/2019 4/5/2018 10/26/2017 4/4/2017   Chol, Total <200 MG/ 175 157 170 145   HDL 40 - 60 MG/DL 71(H) 65(H) 61 57 59   LDL 0 - 100 MG/DL 83.2 75 76 89 65   Trig <150 MG/DL 74 175(H) 98 120 104   Chol/HDL Ratio 0 - 5.0   2.4 2.7 - - -   Some recent data might be hidden     Lab Results   Component Value Date/Time    ALT (SGPT) 40 01/07/2019 01:44 PM     Lab Results   Component Value Date/Time    Hemoglobin A1c 6.5 (H) 01/07/2019 01:44 PM    Hemoglobin A1c (POC) 6.6 03/09/2020 02:03 PM       EKG    ECHO (11/15)  Left ventricle: The ventricle was dilated. Systolic function was moderately reduced. Ejection fraction was estimated in the range of 30 %  to 35 %. There was mild diffuse hypokinesis.  There was severe hypokinesis of inferior/inferolateral wall. Wall thickness was mildly increased. Doppler parameters were consistent with abnormal left ventricular relaxation (grade 1 diastolic dysfunction). Right ventricle: Systolic function was normal.  Aortic valve: There was no stenosis. ECHO (04/16)  Left ventricle: Size was at the upper limits of normal. Systolic function  was moderately to markedly reduced by visual assessment. Ejection fraction  was estimated to be 30 %. There was severe hypokinesis of the basal-mid  inferior and basal-mid inferolateral wall(s). Wall thickness was mildly  increased. Doppler parameters were consistent with abnormal left  ventricular relaxation (grade 1 diastolic dysfunction). Right ventricle: Systolic function was normal. Estimated peak pressure was in the range of 30 mmHg to 35 mmHg. Left atrium: The atrium was mildly dilated. Right atrium: The atrium was mildly dilated. Mitral valve: There was mild annular calcification. There was mild regurgitation. Aortic valve: There was no stenosis. Tricuspid valve: There was trivial regurgitation. CATHETERIZATION (11/15)  - LVEF estimated to be 30% with diffuse hypokinesis. No significant mitral regurgitation was noted. - LM: calcification, 20% distal   - LAD had diffuse 50% stenosis in its midportion of the vessel with only luminal irregularities in the proximal LAD. There was a  discrete 50% distal vessel stenosis, as well. The diagonal branches had mild disease.   - LCx: Circumflex had an ostial 50-60% stenosis,   - OM1: 100% thrombotic occlusion proximally. There was very faint collateral filling from the RCA. - RCA: Prox  50% diffuse, 40% distal disease. The right PDA had an 80% ostial stenosis and then a 90% mid vessel stenosis. The right  posterolateral branch had a 90% stenosis in a sub branch. 4. PCI: 100% OM-1 stenosis reduced to 0%. drug-eluting stent, Xience 2.5 x 23 mm     IMPRESSION & PLAN:  Mr. Tammie Ma is a 76 y.o. male with cardiomyopathy, coronary artery disease, hypertension, hyperlipidemia. Coronary artery disease:  Mr. Alessandro Guzman had an OM1 stent in November, 2015. No angina. No use of S/L NTG since last visit. Continue with medical management, including aspirin lifelong. He is also on Coreg and Lipitor. Cardiomyopathy:    His initial ejection fraction in a setting of NSTEMI was 30-35% in November 2015. A repeat EF remains 30% in April 2016, despite being on appropriate medical management. S/P Bi-V AICD placement by  in 07/16. Continue current medication no ICD shock. No fluid overload on exam.  Currently on hydrochlorothiazide    Hypertension:  BP is 129/84 mm hg. Continue same meds. Hyperlipidemia:  He is on Atorvastatin 80 mg daily. Continue same. Last LDL 83    This plan was discussed with patient who is in agreement. Thank you for allowing me to participate in patient care. Please feel free to call me if you have any question or concern. Judith Curtis MD  Please note: This document has been produced using voice recognition software. Unrecognized errors in transcription may be present.

## 2020-03-19 LAB
ALBUMIN/CREAT UR: 5 MG/G CREAT (ref 0–29)
CREAT UR-MCNC: 106.8 MG/DL
MICROALBUMIN UR-MCNC: 5.2 UG/ML

## 2020-03-25 ENCOUNTER — OFFICE VISIT (OUTPATIENT)
Dept: CARDIOLOGY CLINIC | Age: 65
End: 2020-03-25

## 2020-03-25 DIAGNOSIS — Z95.810 AICD (AUTOMATIC CARDIOVERTER/DEFIBRILLATOR) PRESENT: ICD-10-CM

## 2020-03-25 DIAGNOSIS — I42.0 DILATED CARDIOMYOPATHY (HCC): Primary | ICD-10-CM

## 2020-04-08 NOTE — PROGRESS NOTES
I have personally seen and evaluated the device findings. Interrogation reviewed and I agree with assessment.     Justyn Carson

## 2020-06-18 ENCOUNTER — TELEPHONE (OUTPATIENT)
Dept: FAMILY MEDICINE CLINIC | Age: 65
End: 2020-06-18

## 2020-06-18 NOTE — TELEPHONE ENCOUNTER
Pt called in and was wanting to speak with the nurse about a skin rash or possibly ringworm and he got some ointment from the [place] called aquaphor and he used it and now it has turned a reddish blue color. He stated that the spot is on his chest near his pacemaker and he is very concerned about it and would like a call back from the nurse as soon as possible. Please advise.

## 2020-06-19 ENCOUNTER — VIRTUAL VISIT (OUTPATIENT)
Dept: FAMILY MEDICINE CLINIC | Age: 65
End: 2020-06-19

## 2020-06-19 DIAGNOSIS — L30.9 DERMATITIS: Primary | ICD-10-CM

## 2020-06-19 NOTE — PROGRESS NOTES
Kaylee Church presents today for No chief complaint on file. Is someone accompanying this pt? na    Is the patient using any DME equipment during OV? na    Depression Screening:  3 most recent PHQ Screens 3/9/2020   Little interest or pleasure in doing things Not at all   Feeling down, depressed, irritable, or hopeless Not at all   Total Score PHQ 2 0       Learning Assessment:  Learning Assessment 3/16/2017   PRIMARY LEARNER Patient   HIGHEST LEVEL OF EDUCATION - PRIMARY LEARNER  DID NOT GRADUATE [facility] PRIMARY LEARNER NONE   CO-LEARNER CAREGIVER No   PRIMARY LANGUAGE ENGLISH   LEARNER PREFERENCE PRIMARY DEMONSTRATION   ANSWERED BY patient   RELATIONSHIP SELF       Abuse Screening:  Abuse Screening Questionnaire 5/10/2016   Do you ever feel afraid of your partner? N   Are you in a relationship with someone who physically or mentally threatens you? N   Is it safe for you to go home? Y       Fall Risk  Fall Risk Assessment, last 12 mths 3/9/2020   Able to walk? Yes   Fall in past 12 months? No       Health Maintenance reviewed and discussed and ordered per Provider. Health Maintenance Due   Topic Date Due    Eye Exam Retinal or Dilated  01/29/1965    Shingrix Vaccine Age 50> (1 of 2) 01/29/2004    GLAUCOMA SCREENING Q2Y  01/29/2019   . Coordination of Care:  1. Have you been to the ER, urgent care clinic since your last visit? Hospitalized since your last visit? no    2. Have you seen or consulted any other health care providers outside of the [address] [place] [address] since your last visit? Include any pap smears or colon screening. no      Last  Checked na  Last UDS Checked na  Last Pain contract signed: na    Patient concerns today: [facility] chest[facility] red.

## 2020-06-19 NOTE — PROGRESS NOTES
Jose Enrique Gilliland is a 76 y.o. male evaluated via audio only technology on 6/19/2020. Consent: He and/or his health care decision maker is aware that he may receive a bill for this audio only encounter, depending on his insurance coverage, and has provided verbal consent to proceed: Yes    I communicated with the patient and/or health care decision maker about the nature and details of the following:  Assessment & Plan:   Diagnoses and all orders for this visit:    1. Dermatitis    continue vasoline and current detergent    12  Subjective:   Jose Enrique Gilliland is a 76 y.o. male who was seen for Rash  he has a rash at the site of his pacemaker; he used aquaphor and the rash turned red. He reports that it is on his chest; he used vasoline and this improved the rash. He changed his laundry detergent. He has not changed his soap. He is taking antihistamine for allergies    Prior to Admission medications    Medication Sig Start Date End Date Taking? Authorizing Provider   fluticasone propionate (Flonase Allergy Relief) 50 mcg/actuation nasal spray 2 Sprays by Both Nostrils route daily. Provider, Historical   nitroglycerin (NITROSTAT) 0.4 mg SL tablet 0.4 mg by SubLINGual route every five (5) minutes as needed for Chest Pain. Up to 3 doses. Provider, Historical   cetaphil (CETAPHIL) topical cream Apply  to affected area as needed (dry skin). 3/26/19   Wild Esparza MD   cetirizine (ZYRTEC) 10 mg tablet Take 1 Tab by mouth daily as needed for Allergies. 1/7/19   Gerard Jeffery MD   hydrALAZINE (APRESOLINE) 25 mg tablet Take 1 Tab by mouth three (3) times daily. 7/5/18   Gerard Jeffery MD   pantoprazole (PROTONIX) 40 mg tablet Take 1 Tab by mouth daily. 7/5/18   Gerard Jeffery MD   losartan (COZAAR) 100 mg tablet Take 1 Tab by mouth daily. 7/5/18   Gerard Jeffery MD   hydroCHLOROthiazide (HYDRODIURIL) 25 mg tablet Take 1 Tab by mouth daily.  7/5/18   Gerard Jeffery MD   atorvastatin (LIPITOR) 80 mg tablet Take 1 Tab by mouth nightly. 7/5/18   Cintia Farley MD   carvedilol (COREG) 25 mg tablet Take 0.5 Tabs by mouth two (2) times daily (with meals). 1/2 tab BID 7/5/18   Cintia Farley MD   NIFEdipine ER (ADALAT CC) 60 mg ER tablet Take 1 Tab by mouth daily. 7/5/18   Cintia Farley MD   albuterol (PROVENTIL HFA, VENTOLIN HFA, PROAIR HFA) 90 mcg/actuation inhaler Take 2 Puffs by inhalation every six (6) hours as needed for Wheezing. 4/4/17   Cintia Farley MD   acetaminophen (TYLENOL EXTRA STRENGTH) 500 mg tablet Take 2 Tabs by mouth every six (6) hours as needed for Pain. 12/8/16   Balta Snow MD   aspirin 81 mg chewable tablet Take 1 Tab by mouth daily. Indications: MYOCARDIAL INFARCTION PREVENTION 11/30/15   Anurag Thomas MD   insulin glargine (LANTUS) 100 unit/mL injection 35 Units by SubCUTAneous route nightly. pt takes 30 Units     Provider, Historical     Allergies   Allergen Reactions    Lasix [Furosemide] Other (comments)    Levofloxacin Rash    Penicillins Swelling       Review of Systems   Constitutional: Negative for chills, fever, malaise/fatigue and weight loss. HENT: Negative for congestion, ear pain and sore throat. Eyes: Negative for blurred vision. Respiratory: Negative for cough, hemoptysis, shortness of breath and wheezing. Cardiovascular: Negative for chest pain, palpitations and leg swelling. Gastrointestinal: Negative for constipation, diarrhea, nausea and vomiting. Genitourinary: Negative for dysuria and urgency. Musculoskeletal: Negative for back pain and joint pain. Skin: Positive for itching and rash. I affirm this is a Patient-Initiated Episode with a Patient who has not had a related appointment within my department in the past 7 days or scheduled within the next 24 hours.     Total Time: minutes: 5-10 minutes    Note: not billable if this call serves to triage the patient into an appointment for the relevant concern      Shaq Hart MD

## 2020-09-04 ENCOUNTER — TELEPHONE (OUTPATIENT)
Dept: FAMILY MEDICINE CLINIC | Age: 65
End: 2020-09-04

## 2020-09-04 NOTE — TELEPHONE ENCOUNTER
Called patient he states his stomach was swollen, he took his water pill and his stomach went down and he's urinating a lot. I told him to keep taking the water pill and if his problem comes back to give me a call, he said he would.

## 2020-09-04 NOTE — TELEPHONE ENCOUNTER
Pt called in and informed me that his stomach has swollen and he isn't sure why. He was wondering if it had to do with covid but he stated he doesn't go anywhere. Pt has a VV scheduled for the 15th at 8:30am but he asked for the nurse to please call him back. Please advise.

## 2020-09-15 ENCOUNTER — VIRTUAL VISIT (OUTPATIENT)
Dept: FAMILY MEDICINE CLINIC | Age: 65
End: 2020-09-15

## 2020-09-15 DIAGNOSIS — E11.65 TYPE 2 DIABETES MELLITUS WITH HYPERGLYCEMIA, WITH LONG-TERM CURRENT USE OF INSULIN (HCC): ICD-10-CM

## 2020-09-15 DIAGNOSIS — Z79.4 TYPE 2 DIABETES MELLITUS WITH HYPERGLYCEMIA, WITH LONG-TERM CURRENT USE OF INSULIN (HCC): ICD-10-CM

## 2020-09-15 DIAGNOSIS — I73.9 PAD (PERIPHERAL ARTERY DISEASE) (HCC): ICD-10-CM

## 2020-09-15 DIAGNOSIS — I10 ESSENTIAL HYPERTENSION: ICD-10-CM

## 2020-09-15 DIAGNOSIS — K43.6 VENTRAL HERNIA WITH OBSTRUCTION AND WITHOUT GANGRENE: Primary | ICD-10-CM

## 2020-09-15 NOTE — PROGRESS NOTES
Jacqueline Calles presents today for   Chief Complaint   Patient presents with    Incisional Hernia       Is someone accompanying this pt? na    Is the patient using any DME equipment during OV? na    Depression Screening:  3 most recent PHQ Screens 3/9/2020   Little interest or pleasure in doing things Not at all   Feeling down, depressed, irritable, or hopeless Not at all   Total Score PHQ 2 0       Learning Assessment:  Learning Assessment 3/16/2017   PRIMARY LEARNER Patient   HIGHEST LEVEL OF EDUCATION - PRIMARY LEARNER  DID NOT GRADUATE [facility] PRIMARY LEARNER NONE   CO-LEARNER CAREGIVER No   PRIMARY LANGUAGE ENGLISH   LEARNER PREFERENCE PRIMARY DEMONSTRATION   ANSWERED BY patient   RELATIONSHIP SELF       Abuse Screening:  Abuse Screening Questionnaire 5/10/2016   Do you ever feel afraid of your partner? N   Are you in a relationship with someone who physically or mentally threatens you? N   Is it safe for you to go home? Y       Fall Risk  Fall Risk Assessment, last 12 mths 3/9/2020   Able to walk? Yes   Fall in past 12 months? No       Health Maintenance reviewed and discussed and ordered per Provider. Health Maintenance Due   Topic Date Due    Eye Exam Retinal or Dilated  01/29/1965    Shingrix Vaccine Age 50> (1 of 2) 01/29/2004    GLAUCOMA SCREENING Q2Y  01/29/2019    Flu Vaccine (1) 09/01/2020    Medicare Yearly Exam  10/04/2020    Foot Exam Q1  10/04/2020   . Coordination of Care:  1. Have you been to the ER, urgent care clinic since your last visit? Hospitalized since your last visit? Yes, 9/7/20, [facility], hernia    2. Have you seen or consulted any other health care providers outside of the [address] [place] [address] since your last visit? Include any pap smears or colon screening.  no      Last  Checked na  Last UDS Checked na  Last Pain contract signed: na    Patients concerns today:  Hernia pain

## 2020-09-15 NOTE — PROGRESS NOTES
Kaelyn Morton is a 76 y.o. male, evaluated via audio-only technology on 9/15/2020 for Incisional Hernia  . Assessment & Plan:   Diagnoses and all orders for this visit:    1. Ventral hernia with obstruction and without gangrene  -     REFERRAL TO SURGERY    2. Type 2 diabetes mellitus with hyperglycemia, with long-term current use of insulin ([facility])  Goal hgb a1c <7  3. Essential hypertension  Goal <130/80  4. PAD (peripheral artery disease) ([facility])  Continue nifedipine and aspirin      12   Subjective:   He was evaluated in the ER at [address] [place] [address] for abdominal swelling; he was seen 4 days ago and was diagnosed with hernia. Diabetes Mellitus  He has diabetes.  He describes symptoms as paresthesias of the feet: mild. Course to date has been well controlled. Patient denies polyuria, polydipsia, visual disturbances and chest pain. Home sugars are running: BGs range between 80 and 90 but this morning was 109. Previous visits for this problem: multiple, this is a diagnosis of longstanding. Last seen 2 months ago by the patient's PCP. Evaluation to date has been see lab results. Treatment goals: Glycemic control: excellent  BP control: acceptable Treatment to date has been decreased dose of insulin: effective.   Cardiovascular Review:  The patient has hypertension, hyperlipidemia, coronary artery disease, obesity and pacemaker. Diet and Lifestyle: generally follows a low fat low cholesterol diet, generally follows a low sodium diet, follows a diabetic diet regularly, exercises sporadically, smoker 1 pack per day  Home BP Monitoring: is not measured at home. Pertinent ROS: taking medications as instructed, no medication side effects noted, no TIA's, no chest pain on exertion, no dyspnea on exertion, no swelling of ankles.      Blood glucose this morning was 109 and Blood pressure was 115/70  Heart rate was 70    Prior to Admission medications    Medication Sig Start Date End Date Taking?  Authorizing Provider   fluticasone propionate (Flonase Allergy Relief) 50 mcg/actuation nasal spray 2 Sprays by Both Nostrils route daily. Provider, Historical   nitroglycerin (NITROSTAT) 0.4 mg SL tablet 0.4 mg by SubLINGual route every five (5) minutes as needed for Chest Pain. Up to 3 doses. Provider, Historical   cetaphil (CETAPHIL) topical cream Apply  to affected area as needed (dry skin). 3/26/19   Gray Arias MD   cetirizine (ZYRTEC) 10 mg tablet Take 1 Tab by mouth daily as needed for Allergies. 1/7/19   Nick Helms MD   hydrALAZINE (APRESOLINE) 25 mg tablet Take 1 Tab by mouth three (3) times daily. 7/5/18   Nick Helms MD   pantoprazole (PROTONIX) 40 mg tablet Take 1 Tab by mouth daily. 7/5/18   Nick Helms MD   losartan (COZAAR) 100 mg tablet Take 1 Tab by mouth daily. 7/5/18   Nick Helms MD   hydroCHLOROthiazide (HYDRODIURIL) 25 mg tablet Take 1 Tab by mouth daily. 7/5/18   Nick Helms MD   atorvastatin (LIPITOR) 80 mg tablet Take 1 Tab by mouth nightly. 7/5/18   Nick Helms MD   carvedilol (COREG) 25 mg tablet Take 0.5 Tabs by mouth two (2) times daily (with meals). 1/2 tab BID 7/5/18   Nick Helms MD   NIFEdipine ER (ADALAT CC) 60 mg ER tablet Take 1 Tab by mouth daily. 7/5/18   Nick Helms MD   albuterol (PROVENTIL HFA, VENTOLIN HFA, PROAIR HFA) 90 mcg/actuation inhaler Take 2 Puffs by inhalation every six (6) hours as needed for Wheezing. 4/4/17   Nick Helms MD   acetaminophen (TYLENOL EXTRA STRENGTH) 500 mg tablet Take 2 Tabs by mouth every six (6) hours as needed for Pain. 12/8/16   Jhoan Parrish MD   aspirin 81 mg chewable tablet Take 1 Tab by mouth daily. Indications: MYOCARDIAL INFARCTION PREVENTION 11/30/15   Nancy Baeza MD   insulin glargine (LANTUS) 100 unit/mL injection 35 Units by SubCUTAneous route nightly.  pt takes 30 Units     Provider, Historical       ROS  General ROS: negative for - chills or fever  Psychological ROS: negative for - anxiety or depression  Respiratory ROS: no cough, shortness of breath, or wheezing  Cardiovascular ROS: no chest pain or dyspnea on exertion  Gastrointestinal ROS: no abdominal pain, change in bowel habits, or black or bloody stools  Genito-Urinary ROS: no dysuria, trouble voiding, or hematuria  Musculoskeletal ROS: negative for - joint pain  Neurological ROS: positive for - numbness/tingling  Dermatological ROS: positive for - dry skin      Jacqueline Calles, who was evaluated through a patient-initiated, synchronous (real-time) audio only encounter, and/or her healthcare decision maker, is aware that it is a billable service, with coverage as determined by his insurance carrier. He provided verbal consent to proceed: Yes. He has not had a related appointment within my department in the past 7 days or scheduled within the next 24 hours.       Total Time: minutes: 5-10 minutes    Talon Howard MD

## 2020-09-15 NOTE — Clinical Note
Please call pt to schedule appointment for October 1st face to face and coordinate this with surgery appointment.

## 2020-09-21 ENCOUNTER — VIRTUAL VISIT (OUTPATIENT)
Dept: CARDIOLOGY CLINIC | Age: 65
End: 2020-09-21

## 2020-09-21 RX ORDER — MELATONIN
1000 2 TIMES DAILY
COMMUNITY
End: 2021-08-25 | Stop reason: SDUPTHER

## 2020-09-21 NOTE — PROGRESS NOTES
Susan Olivera presents today for F/u. Susan Olivera preferred language for health care discussion is english/other. Is someone accompanying this pt? VVSC    Is the patient using any DME equipment during OV? [facility]    Depression Screening:  3 most recent PHQ Screens 9/15/2020   Little interest or pleasure in doing things Not at all   Feeling down, depressed, irritable, or hopeless Not at all   Total Score PHQ 2 0       Learning Assessment:  Learning Assessment 3/16/2017   PRIMARY LEARNER Patient   HIGHEST LEVEL OF EDUCATION - PRIMARY LEARNER  DID NOT GRADUATE HIGH SCHOOL   BARRIERS PRIMARY LEARNER NONE   CO-LEARNER CAREGIVER No   PRIMARY LANGUAGE ENGLISH   LEARNER PREFERENCE PRIMARY DEMONSTRATION   ANSWERED BY patient   RELATIONSHIP SELF       Abuse Screening:  Abuse Screening Questionnaire 9/15/2020   Do you ever feel afraid of your partner? N   Are you in a relationship with someone who physically or mentally threatens you? N   Is it safe for you to go home? Y       Fall Risk  Fall Risk Assessment, last 12 mths 9/15/2020   Able to walk? Yes   Fall in past 12 months? No       Pt currently taking Anticoagulant therapy? no    Coordination of Care:  1. Have you been to the ER, urgent care clinic since your last visit? Hospitalized since your last visit? no    2. Have you seen or consulted any other health care providers outside of the [address] [place] [address] since your last visit? Include any pap smears or colon screening.  no

## 2020-10-05 ENCOUNTER — OFFICE VISIT (OUTPATIENT)
Dept: SURGERY | Age: 65
End: 2020-10-05
Payer: MEDICARE

## 2020-10-05 VITALS
HEART RATE: 83 BPM | TEMPERATURE: 97.5 F | WEIGHT: 240.6 LBS | DIASTOLIC BLOOD PRESSURE: 77 MMHG | BODY MASS INDEX: 33.68 KG/M2 | HEIGHT: 71 IN | SYSTOLIC BLOOD PRESSURE: 131 MMHG | OXYGEN SATURATION: 96 %

## 2020-10-05 DIAGNOSIS — M62.08 DIASTASIS RECTI: ICD-10-CM

## 2020-10-05 DIAGNOSIS — J43.9 PULMONARY EMPHYSEMA, UNSPECIFIED EMPHYSEMA TYPE (HCC): ICD-10-CM

## 2020-10-05 DIAGNOSIS — K42.9 UMBILICAL HERNIA WITHOUT OBSTRUCTION AND WITHOUT GANGRENE: Primary | ICD-10-CM

## 2020-10-05 DIAGNOSIS — I82.722 CHRONIC DEEP VEIN THROMBOSIS (DVT) OF OTHER VEIN OF LEFT UPPER EXTREMITY (HCC): ICD-10-CM

## 2020-10-05 DIAGNOSIS — E11.21 TYPE 2 DIABETES WITH NEPHROPATHY (HCC): ICD-10-CM

## 2020-10-05 DIAGNOSIS — E66.9 OBESITY (BMI 30-39.9): ICD-10-CM

## 2020-10-05 DIAGNOSIS — I24.9 ACS (ACUTE CORONARY SYNDROME) (HCC): ICD-10-CM

## 2020-10-05 PROCEDURE — G8752 SYS BP LESS 140: HCPCS | Performed by: SURGERY

## 2020-10-05 PROCEDURE — 1101F PT FALLS ASSESS-DOCD LE1/YR: CPT | Performed by: SURGERY

## 2020-10-05 PROCEDURE — 99203 OFFICE O/P NEW LOW 30 MIN: CPT | Performed by: SURGERY

## 2020-10-05 PROCEDURE — G8432 DEP SCR NOT DOC, RNG: HCPCS | Performed by: SURGERY

## 2020-10-05 PROCEDURE — G8417 CALC BMI ABV UP PARAM F/U: HCPCS | Performed by: SURGERY

## 2020-10-05 PROCEDURE — 3044F HG A1C LEVEL LT 7.0%: CPT | Performed by: SURGERY

## 2020-10-05 PROCEDURE — G8536 NO DOC ELDER MAL SCRN: HCPCS | Performed by: SURGERY

## 2020-10-05 PROCEDURE — 2022F DILAT RTA XM EVC RTNOPTHY: CPT | Performed by: SURGERY

## 2020-10-05 PROCEDURE — G8754 DIAS BP LESS 90: HCPCS | Performed by: SURGERY

## 2020-10-05 PROCEDURE — 3017F COLORECTAL CA SCREEN DOC REV: CPT | Performed by: SURGERY

## 2020-10-05 PROCEDURE — G8427 DOCREV CUR MEDS BY ELIG CLIN: HCPCS | Performed by: SURGERY

## 2020-10-05 NOTE — PROGRESS NOTES
Giuseppe Pelletier is a 76 y.o. male (: 1955) presenting to address:    Chief Complaint   Patient presents with    New Patient     Ventral hernia/ referred by Dr Pratima Skinner       Medication list and allergies have been reviewed with Giuseppe Pelletier and updated as of today's date. I have gone over all Medical, Surgical and Social History with Giuseppe Pelletier and updated/added the information accordingly.

## 2020-10-05 NOTE — PATIENT INSTRUCTIONS
If you have any questions or concerns about today's appointment, the verbal and/or written instructions you were given for follow up care, please call our office at [phone].     [facility] - [address]    [phone] office  [phone]

## 2020-10-05 NOTE — PROGRESS NOTES
General Surgery Consult      [address]  Admit date: (Not on file)    MRN: [id number]     : 1955     Age: 76 y.o. Attending Physician: Blayne Morgan MD [facility]      History of Present Illness:     Angely Leos is a 76 y.o. male who was referred to me for evaluation of abdominal wall hernia. The patient has stated that he was told at the  [facility] that he has a huge anterior abdominal wall hernia. The patient has significant multiple medical problems including morbid obesity as well as deep venous thrombosis, coronary artery disease, hypertension, emphysema and other medical issues. He stated that about 1 to 2 months ago he felt a bloating of his abdominal wall and his abdomen and he stated that he went to the [address] [place] [address]. He said that he was told that he has a large abdominal wall hernia and he was referred to me for evaluation. He did not have any imaging studies. He said that the bulge that he noticed in his anterior abdominal wall happens when he tries to sit up.      Patient Active Problem List    Diagnosis Date Noted    Advance care planning 10/04/2019    Type 2 diabetes with nephropathy ([address] 2019    Coronary artery disease involving native coronary artery of native heart without angina pectoris 10/26/2017    Essential hypertension 2017    Type 2 diabetes mellitus with diabetic neuropathy, with long-term current use of insulin ([address] 2016    Deep vein thrombosis (DVT) of left upper extremity ([address] 2016    PAD (peripheral artery disease) ([address] 2016    Hypercholesteremia 2016    Deep venous thrombosis ([address] 2016    Emphysema of lung ([address] 2016    Ex-smoker 2016    Restrictive ventilatory defect 2016    Obesity (BMI 30-39.9) 2016    Chronic combined systolic and diastolic heart failure ([address] 2016    AICD (automatic cardioverter/defibrillator) present 2016    Hearing impairment 2015    Coronary artery disease involving native coronary artery without angina pectoris 2015    ACS (acute coronary syndrome) ([facility] 2015    Elevated troponin 2015    HLD (hyperlipidemia) 2015    Hypertension 08/15/2015    Type II or unspecified type diabetes mellitus without mention of complication, not stated as uncontrolled 08/15/2015    Acute lacunar stroke ([facility] 08/15/2015     Past Medical History:   Diagnosis Date    Biventricular ICD (implantable cardioverter-defibrillator) in place     [employer]    CAD (coronary artery disease)     Cardiomyopathy, ischemic     EF 30% () 30-35% (11/15)    Diabetes ([facility]     DVT (deep venous thrombosis) ([facility]     L axillary vein after PPM insertion    HLD (hyperlipidemia)     Hypertension     NSTEMI (non-ST elevated myocardial infarction) ([facility]     S/P OM1 2.5 X 23 mm XIENCE (11/15)    Pulmonary emphysema ([facility]     Stroke [facility])       Past Surgical History:   Procedure Laterality Date    COLONOSCOPY N/A 2017    COLONOSCOPY performed by Kwesi Frost MD at [facility] ENDOSCOPY    HX PACEMAKER PLACEMENT      VASCULAR SURGERY PROCEDURE UNLIST      Stent placed right thigh      Social History     Tobacco Use    Smoking status: Former Smoker     Packs/day: 1.50     Years: 30.00     Pack years: 45.00     Types: Cigarettes     Last attempt to quit: 2015     Years since quittin.8    Smokeless tobacco: Never Used   Substance Use Topics    Alcohol use: No     Alcohol/week: 0.0 standard drinks      Social History     Tobacco Use   Smoking Status Former Smoker    Packs/day: 1.50    Years: 30.00    Pack years: 45.00    Types: Cigarettes    Last attempt to quit: 2015    Years since quittin.8   Smokeless Tobacco Never Used     Family History   Problem Relation Age of Onset    Heart Disease Mother     Heart Disease Father       Current Outpatient Medications   Medication Sig    cholecalciferol (VITAMIN D3) (1000 Units /25 mcg) tablet Take 1,000 Units by mouth two (2) times a day.  fluticasone propionate (Flonase Allergy Relief) 50 mcg/actuation nasal spray 2 Sprays by Both Nostrils route daily.  nitroglycerin (NITROSTAT) 0.4 mg SL tablet 0.4 mg by SubLINGual route every five (5) minutes as needed for Chest Pain. Up to 3 doses.  cetaphil (CETAPHIL) topical cream Apply  to affected area as needed (dry skin).  cetirizine (ZYRTEC) 10 mg tablet Take 1 Tab by mouth daily as needed for Allergies.  hydrALAZINE (APRESOLINE) 25 mg tablet Take 1 Tab by mouth three (3) times daily.  pantoprazole (PROTONIX) 40 mg tablet Take 1 Tab by mouth daily.  losartan (COZAAR) 100 mg tablet Take 1 Tab by mouth daily.  hydroCHLOROthiazide (HYDRODIURIL) 25 mg tablet Take 1 Tab by mouth daily.  atorvastatin (LIPITOR) 80 mg tablet Take 1 Tab by mouth nightly.  carvedilol (COREG) 25 mg tablet Take 0.5 Tabs by mouth two (2) times daily (with meals). 1/2 tab BID    NIFEdipine ER (ADALAT CC) 60 mg ER tablet Take 1 Tab by mouth daily.  albuterol (PROVENTIL HFA, VENTOLIN HFA, PROAIR HFA) 90 mcg/actuation inhaler Take 2 Puffs by inhalation every six (6) hours as needed for Wheezing.  acetaminophen (TYLENOL EXTRA STRENGTH) 500 mg tablet Take 2 Tabs by mouth every six (6) hours as needed for Pain.  aspirin 81 mg chewable tablet Take 1 Tab by mouth daily. Indications: MYOCARDIAL INFARCTION PREVENTION    insulin glargine (LANTUS) 100 unit/mL injection 28 Units by SubCUTAneous route nightly. pt takes 30 Units      No current facility-administered medications for this visit.        Allergies   Allergen Reactions    Lasix [Furosemide] Other (comments)    Levofloxacin Rash    Penicillins Swelling        Review of Systems:  Constitutional: negative  Eyes: negative  Ears, Nose, Mouth, Throat, and Face: negative  Respiratory: negative  Cardiovascular: negative  Gastrointestinal: positive for Abdominal bloating  Genitourinary:negative  Integument/Breast: negative  Hematologic/Lymphatic: negative  Musculoskeletal:negative  Neurological: negative  Behavioral/Psychiatric: negative  Endocrine: negative  Allergic/Immunologic: negative    Objective:     Visit Vitals  /77 (BP 1 Location: Right arm, BP Patient Position: Sitting)   Pulse 83   Temp 97.5 °F (36.4 °C) (Skin)   Ht 5' 11\" (1.803 m)   Wt 109.1 kg (240 lb 9.6 oz)   SpO2 96%   BMI 33.56 kg/m²       Physical Exam:      General:  in no apparent distress, alert, oriented times 3 and cooperative   Eyes:  conjunctivae and sclerae normal, pupils equal, round, reactive to light   Throat & Neck: no erythema or exudates noted and neck supple and symmetrical; no palpable masses   Lungs:   clear to auscultation bilaterally   Heart:  Regular rate and rhythm   Abdomen:   rounded, obese and protuberant, soft, nontender, nondistended, no masses or organomegaly. There is a small umbilical hernia about 1 cm in size that is easily reducible and nontender. There is a large diastases recti but no evidence of any ventral or epigastric hernia.     Extremities: extremities normal, atraumatic, no cyanosis or edema   Skin: Normal.       Imaging and Lab Review:     CBC:   Lab Results   Component Value Date/Time    WBC 6.0 01/07/2019 01:44 PM    RBC 5.29 01/07/2019 01:44 PM    HGB 14.7 01/07/2019 01:44 PM    HCT 46.1 01/07/2019 01:44 PM    PLATELET 324 25/73/9654 01:44 PM     BMP:   Lab Results   Component Value Date/Time    Glucose 130 (H) 01/07/2019 01:44 PM    Sodium 140 01/07/2019 01:44 PM    Potassium 3.6 01/07/2019 01:44 PM    Chloride 106 01/07/2019 01:44 PM    CO2 26 01/07/2019 01:44 PM    BUN 20 (H) 01/07/2019 01:44 PM    Creatinine 1.24 01/07/2019 01:44 PM    Calcium 9.1 01/07/2019 01:44 PM     CMP:  Lab Results   Component Value Date/Time    Glucose 130 (H) 01/07/2019 01:44 PM    Sodium 140 01/07/2019 01:44 PM    Potassium 3.6 01/07/2019 01:44 PM    Chloride 106 01/07/2019 01:44 PM    CO2 26 01/07/2019 01:44 PM    BUN 20 (H) 01/07/2019 01:44 PM    Creatinine 1.24 01/07/2019 01:44 PM    Calcium 9.1 01/07/2019 01:44 PM    Anion gap 8 01/07/2019 01:44 PM    BUN/Creatinine ratio 16 01/07/2019 01:44 PM    Alk. phosphatase 144 (H) 01/07/2019 01:44 PM    Protein, total 7.4 01/07/2019 01:44 PM    Albumin 3.8 01/07/2019 01:44 PM    Globulin 3.6 01/07/2019 01:44 PM    A-G Ratio 1.1 01/07/2019 01:44 PM       No results found for this or any previous visit (from the past 24 hour(s)). images and reports reviewed    Assessment:   Huntley Cranker is a 76 y.o. male who has multiple medical conditions including morbid obesity as well as coronary artery disease and emphysema who is presenting with abdominal bloating and what seems to be swelling in his anterior abdominal wall. I explained to him that he does not have any abdominal wall hernia except a small umbilical hernia and the bulge he is noticing is called diastases recti which I explained to him. I also explained to him that even if he did have a incisional/ventral hernia he is not currently a good candidate for surgery. I did discuss with him his small umbilical hernia and that the fact that is currently asymptomatic we should not do any surgical intervention. I explained to him the importance of treatment of his medical conditions including his morbid obesity and emphysema.      Plan:     No need for any surgical intervention  Lose weight  Treatment of his medical conditions  Follow-up with me in 3 months    Please call me if you have any questions (cell phone: [phone])     Signed By: Liborio Felty, MD     October 5, 2020

## 2020-10-09 ENCOUNTER — TELEPHONE (OUTPATIENT)
Dept: FAMILY MEDICINE CLINIC | Age: 65
End: 2020-10-09

## 2020-10-09 NOTE — TELEPHONE ENCOUNTER
Pt. Wants to know if he could take  Equate DM Mucus Relief along with his other medications. Please advise.

## 2021-01-04 ENCOUNTER — HOSPITAL ENCOUNTER (EMERGENCY)
Age: 66
Discharge: HOME | End: 2021-01-04
Attending: EMERGENCY MEDICINE
Payer: MEDICARE

## 2021-01-04 VITALS
HEIGHT: 69 IN | OXYGEN SATURATION: 99 % | WEIGHT: 228 LBS | HEART RATE: 100 BPM | DIASTOLIC BLOOD PRESSURE: 104 MMHG | RESPIRATION RATE: 18 BRPM | TEMPERATURE: 98.4 F | BODY MASS INDEX: 33.77 KG/M2 | SYSTOLIC BLOOD PRESSURE: 144 MMHG

## 2021-01-04 DIAGNOSIS — R03.0 ELEVATED BLOOD PRESSURE READING: Primary | ICD-10-CM

## 2021-01-04 PROCEDURE — 99283 EMERGENCY DEPT VISIT LOW MDM: CPT

## 2021-01-04 PROCEDURE — 99282 EMERGENCY DEPT VISIT SF MDM: CPT

## 2021-01-04 NOTE — ED TRIAGE NOTES
Pt arrives through triage for c/o hypertension. States his BP has been elevated and he checked it and it was elevated. Wants his BP evaluated here.

## 2021-01-25 ENCOUNTER — TELEPHONE (OUTPATIENT)
Dept: FAMILY MEDICINE CLINIC | Age: 66
End: 2021-01-25

## 2021-01-25 NOTE — TELEPHONE ENCOUNTER
Pt states he is taking the Covid vaccine in the morning but he needs to know if its ok for him to take vaccine.   Please assist

## 2021-04-01 ENCOUNTER — OFFICE VISIT (OUTPATIENT)
Dept: CARDIOLOGY CLINIC | Age: 66
End: 2021-04-01
Payer: MEDICARE

## 2021-04-01 VITALS
BODY MASS INDEX: 33.04 KG/M2 | HEART RATE: 75 BPM | WEIGHT: 236 LBS | OXYGEN SATURATION: 93 % | DIASTOLIC BLOOD PRESSURE: 89 MMHG | SYSTOLIC BLOOD PRESSURE: 143 MMHG | RESPIRATION RATE: 16 BRPM | HEIGHT: 71 IN | TEMPERATURE: 97.6 F

## 2021-04-01 DIAGNOSIS — I42.0 DILATED CARDIOMYOPATHY (HCC): Primary | ICD-10-CM

## 2021-04-01 PROCEDURE — 3017F COLORECTAL CA SCREEN DOC REV: CPT | Performed by: INTERNAL MEDICINE

## 2021-04-01 PROCEDURE — G8417 CALC BMI ABV UP PARAM F/U: HCPCS | Performed by: INTERNAL MEDICINE

## 2021-04-01 PROCEDURE — G8510 SCR DEP NEG, NO PLAN REQD: HCPCS | Performed by: INTERNAL MEDICINE

## 2021-04-01 PROCEDURE — 99214 OFFICE O/P EST MOD 30 MIN: CPT | Performed by: INTERNAL MEDICINE

## 2021-04-01 PROCEDURE — 93000 ELECTROCARDIOGRAM COMPLETE: CPT | Performed by: INTERNAL MEDICINE

## 2021-04-01 PROCEDURE — 1101F PT FALLS ASSESS-DOCD LE1/YR: CPT | Performed by: INTERNAL MEDICINE

## 2021-04-01 PROCEDURE — G8753 SYS BP > OR = 140: HCPCS | Performed by: INTERNAL MEDICINE

## 2021-04-01 PROCEDURE — G8754 DIAS BP LESS 90: HCPCS | Performed by: INTERNAL MEDICINE

## 2021-04-01 PROCEDURE — G8536 NO DOC ELDER MAL SCRN: HCPCS | Performed by: INTERNAL MEDICINE

## 2021-04-01 PROCEDURE — G8428 CUR MEDS NOT DOCUMENT: HCPCS | Performed by: INTERNAL MEDICINE

## 2021-04-01 RX ORDER — SACUBITRIL AND VALSARTAN 49; 51 MG/1; MG/1
1 TABLET, FILM COATED ORAL 2 TIMES DAILY
Qty: 60 TAB | Refills: 5 | Status: SHIPPED | OUTPATIENT
Start: 2021-04-01 | End: 2021-04-01 | Stop reason: SDUPTHER

## 2021-04-01 RX ORDER — SACUBITRIL AND VALSARTAN 49; 51 MG/1; MG/1
1 TABLET, FILM COATED ORAL 2 TIMES DAILY
Qty: 60 TAB | Refills: 5 | Status: SHIPPED | OUTPATIENT
Start: 2021-04-01 | End: 2021-11-01

## 2021-04-01 NOTE — LETTER
4/1/2021 Patient: Sarah Crowley YOB: 1955 Date of Visit: 4/1/2021 Charissa Stack MD 
[address] [place] [id number] [address] [employer] Dear Charissa Stack MD, Thank you for referring Mr. Nichelle Berman to [facility]IST AT [facility] for evaluation. My notes for this consultation are attached. If you have questions, please do not hesitate to call me. I look forward to following your patient along with you. Sincerely, Anson Donaldson MD

## 2021-04-01 NOTE — PROGRESS NOTES
Susan Olivera presents today for No chief complaint on file. Susan Olivera preferred language for health care discussion is english/other. Personal Protective Equipment:   Personal Protective Equipment was used including: mask-surgical and hands-gloves. Patient was placed on no precaution(s). Patient was masked. Is someone accompanying this pt? no    Is the patient using any DME equipment during [address]? no    Depression Screening:  3 most recent PHQ Screens 4/1/2021   Little interest or pleasure in doing things Not at all   Feeling down, depressed, irritable, or hopeless Not at all   Total Score PHQ 2 0       Learning Assessment:  Learning Assessment 3/16/2017   PRIMARY LEARNER Patient   HIGHEST LEVEL OF EDUCATION - PRIMARY LEARNER  DID NOT GRADUATE HIGH SCHOOL   BARRIERS PRIMARY LEARNER NONE   CO-LEARNER CAREGIVER No   PRIMARY LANGUAGE ENGLISH   LEARNER PREFERENCE PRIMARY DEMONSTRATION   ANSWERED BY patient   RELATIONSHIP SELF       Abuse Screening:  Abuse Screening Questionnaire 9/15/2020   Do you ever feel afraid of your partner? N   Are you in a relationship with someone who physically or mentally threatens you? N   Is it safe for you to go home? Y       Fall Risk  Fall Risk Assessment, last 12 mths 4/1/2021   Able to walk? Yes   Fall in past 12 months? 0   Do you feel unsteady? 0   Are you worried about falling 0       Pt currently taking Anticoagulant therapy? no    Coordination of Care:  1. Have you been to the ER, urgent care clinic since your last visit? Hospitalized since your last visit? no    2. Have you seen or consulted any other health care providers outside of the [address] [place] [address] since your last visit? Include any pap smears or colon screening.  no

## 2021-04-01 NOTE — PATIENT INSTRUCTIONS
New Medication/Medication Changes  Start  entresto 49/51 mg twice a day after you have discontinued losartan for 2 days    **please allow 24-48 hrs for medication to be escribed to pharmacy** If you need any refills on medications please contact your pharmacy so that the request can be escribed to the provider for review.

## 2021-04-01 NOTE — PROGRESS NOTES
[facility]    Mr. Lois Yang is a 78 y.o. male with a history of coronary artery disease, status post OM1 stent in November, 2015, diabetes, hypertension, stroke, hyperlipidemia, cardiomyopathy s/p BiV ICD in 07/16    Mr. Lois Yang is here today for follow up appointment. Denies any resting or exertional chest pain or chest pressure to suggest angina or any dyspnea to suggest heart failure. No presyncope or syncope  Denies any PND or LE edema. Taking all medications regularly. He does not know the names of medication he is taking. He will go home and call us back. Past Medical History:   Diagnosis Date    Biventricular ICD (implantable cardioverter-defibrillator) in place 07/16    [employer]    CAD (coronary artery disease)     Cardiomyopathy, ischemic     EF 30% (04/16) 30-35% (11/15)    Diabetes ([address]     DVT (deep venous thrombosis) ([address] 08/16    L axillary vein after PPM insertion    HLD (hyperlipidemia)     Hypertension     NSTEMI (non-ST elevated myocardial infarction) ([address]     S/P OM1 2.5 X 23 mm XIENCE (11/15)    Pulmonary emphysema ([address]     Stroke [facility])          Past Surgical History:   Procedure Laterality Date    COLONOSCOPY N/A 12/14/2017    COLONOSCOPY performed by Salvador Mora MD at [address] HX PACEMAKER PLACEMENT      VASCULAR SURGERY PROCEDURE UNLIST      Stent placed right thigh       Current Outpatient Medications   Medication Sig    cholecalciferol (VITAMIN D3) (1000 Units /25 mcg) tablet Take 1,000 Units by mouth two (2) times a day.  fluticasone propionate (Flonase Allergy Relief) 50 mcg/actuation nasal spray 2 Sprays by Both Nostrils route daily.  nitroglycerin (NITROSTAT) 0.4 mg SL tablet 0.4 mg by SubLINGual route every five (5) minutes as needed for Chest Pain. Up to 3 doses.  cetaphil (CETAPHIL) topical cream Apply  to affected area as needed (dry skin).     cetirizine (ZYRTEC) 10 mg tablet Take 1 Tab by mouth daily as needed for Allergies.  hydrALAZINE (APRESOLINE) 25 mg tablet Take 1 Tab by mouth three (3) times daily.  pantoprazole (PROTONIX) 40 mg tablet Take 1 Tab by mouth daily.  losartan (COZAAR) 100 mg tablet Take 1 Tab by mouth daily.  hydroCHLOROthiazide (HYDRODIURIL) 25 mg tablet Take 1 Tab by mouth daily.  atorvastatin (LIPITOR) 80 mg tablet Take 1 Tab by mouth nightly.  carvedilol (COREG) 25 mg tablet Take 0.5 Tabs by mouth two (2) times daily (with meals). 1/2 tab BID    NIFEdipine ER (ADALAT CC) 60 mg ER tablet Take 1 Tab by mouth daily.  albuterol (PROVENTIL HFA, VENTOLIN HFA, PROAIR HFA) 90 mcg/actuation inhaler Take 2 Puffs by inhalation every six (6) hours as needed for Wheezing.  acetaminophen (TYLENOL EXTRA STRENGTH) 500 mg tablet Take 2 Tabs by mouth every six (6) hours as needed for Pain.  aspirin 81 mg chewable tablet Take 1 Tab by mouth daily. Indications: MYOCARDIAL INFARCTION PREVENTION    insulin glargine (LANTUS) 100 unit/mL injection 28 Units by SubCUTAneous route nightly. pt takes 30 Units      No current facility-administered medications for this visit. Allergies and Sensitivities:  Allergies   Allergen Reactions    Lasix [Furosemide] Other (comments)    Levofloxacin Rash    Penicillins Swelling       Family History:  Family History   Problem Relation Age of Onset    Heart Disease Mother     Heart Disease Father        Social History:  Social History     Tobacco Use    Smoking status: Former Smoker     Packs/day: 1.50     Years: 30.00     Pack years: 45.00     Types: Cigarettes     Quit date: 2015     Years since quittin.3    Smokeless tobacco: Never Used   Substance Use Topics    Alcohol use: No     Alcohol/week: 0.0 standard drinks    Drug use: No     He  reports that he quit smoking about 5 years ago. His smoking use included cigarettes. He has a 45.00 pack-year smoking history.  He has never used smokeless tobacco.  He  reports no history of alcohol use. Review of Systems:  Cardiac symptoms as noted above in HPI. All others negative. Physical Exam:  BP Readings from Last 3 Encounters:   04/01/21 (!) 143/89   01/04/21 (!) 144/104   10/05/20 131/77         Pulse Readings from Last 3 Encounters:   04/01/21 75   01/04/21 100   10/05/20 83          Wt Readings from Last 3 Encounters:   04/01/21 236 lb (107 kg)   01/04/21 228 lb (103.4 kg)   10/05/20 240 lb 9.6 oz (109.1 kg)       Constitutional: Oriented to person, place, and time. HENT: Head: Normocephalic and atraumatic. Neck: No JVD present. Cardiovascular: Regular rhythm. No murmur, gallop or rubs appreciated  Lung: Breath sounds normal. No respiratory distress. No ronchi or rales appreciated  Abdominal: No tenderness. No rebound and no guarding. Musculoskeletal: There is no lower extremity edema. No cynosis. Review of Data  LABS:   Lab Results   Component Value Date/Time    Sodium 140 01/07/2019 01:44 PM    Potassium 3.6 01/07/2019 01:44 PM    Chloride 106 01/07/2019 01:44 PM    CO2 26 01/07/2019 01:44 PM    Glucose 130 (H) 01/07/2019 01:44 PM    BUN 20 (H) 01/07/2019 01:44 PM    Creatinine 1.24 01/07/2019 01:44 PM     Lipids Latest Ref Rng & Units 3/9/2020 1/7/2019 4/5/2018 10/26/2017 4/4/2017   Chol, Total <200 MG/ 175 157 170 145   HDL 40 - 60 MG/DL 71(H) 65(H) 61 57 59   LDL 0 - 100 MG/DL 83.2 75 76 89 65   Trig <150 MG/DL 74 175(H) 98 120 104   Chol/HDL Ratio 0 - 5.0   2.4 2.7 - - -   Some recent data might be hidden     Lab Results   Component Value Date/Time    ALT (SGPT) 40 01/07/2019 01:44 PM     Lab Results   Component Value Date/Time    Hemoglobin A1c 6.5 (H) 01/07/2019 01:44 PM    Hemoglobin A1c (POC) 6.6 03/09/2020 02:03 PM       EKG    ECHO (11/15)  Left ventricle: The ventricle was dilated. Systolic function was moderately reduced. Ejection fraction was estimated in the range of 30 %  to 35 %.  There was mild diffuse hypokinesis. There was severe hypokinesis of inferior/inferolateral wall. Wall thickness was mildly increased. Doppler parameters were consistent with abnormal left ventricular relaxation (grade 1 diastolic dysfunction). Right ventricle: Systolic function was normal.  Aortic valve: There was no stenosis. ECHO (04/16)  Left ventricle: Size was at the upper limits of normal. Systolic function  was moderately to markedly reduced by visual assessment. Ejection fraction  was estimated to be 30 %. There was severe hypokinesis of the basal-mid  inferior and basal-mid inferolateral wall(s). Wall thickness was mildly  increased. Doppler parameters were consistent with abnormal left  ventricular relaxation (grade 1 diastolic dysfunction). Right ventricle: Systolic function was normal. Estimated peak pressure was in the range of 30 mmHg to 35 mmHg. Left atrium: The atrium was mildly dilated. Right atrium: The atrium was mildly dilated. Mitral valve: There was mild annular calcification. There was mild regurgitation. Aortic valve: There was no stenosis. Tricuspid valve: There was trivial regurgitation. CATHETERIZATION (11/15)  - LVEF estimated to be 30% with diffuse hypokinesis. No significant mitral regurgitation was noted. - LM: calcification, 20% distal   - LAD had diffuse 50% stenosis in its midportion of the vessel with only luminal irregularities in the proximal LAD. There was a  discrete 50% distal vessel stenosis, as well. The diagonal branches had mild disease.   - LCx: Circumflex had an ostial 50-60% stenosis,   - OM1: 100% thrombotic occlusion proximally. There was very faint collateral filling from the RCA. - RCA: Prox  50% diffuse, 40% distal disease. The right PDA had an 80% ostial stenosis and then a 90% mid vessel stenosis. The right  posterolateral branch had a 90% stenosis in a sub branch. 4. PCI: 100% OM-1 stenosis reduced to 0%.   drug-eluting stent, Xience 2.5 x 23 mm     IMPRESSION & PLAN:  Mr. Kristy Kayser is a 78 y.o. male with cardiomyopathy, coronary artery disease, hypertension, hyperlipidemia. Coronary artery disease:  Mr. Kristy Kayser had an OM1 stent in November, 2015. No angina. No use of S/L NTG since last visit. Continue with medical management, including aspirin lifelong. He is also on Coreg and Lipitor. Cardiomyopathy:    His initial ejection fraction in a setting of NSTEMI was 30-35% in November 2015. A repeat EF remains 30% in April 2016, despite being on appropriate medical management. S/P Bi-V AICD placement by  in 07/16. Continue current medication no ICD shock. No fluid overload on exam.    Continue Coreg. Currently on losartan, hydrochlorothiazide, hydralazine  Because of his LV dysfunction and CHF, recommend Entresto 49/51 mg twice daily. If approved, I would discontinue losartan. Patient understands the plan    Hypertension:  BP is 143/89 mm hg. Continue same meds. Hyperlipidemia:  He is on Atorvastatin 80 mg daily. Continue same. Last LDL 83    This plan was discussed with patient who is in agreement. Thank you for allowing me to participate in patient care. Please feel free to call me if you have any question or concern. Alysa Argueta MD  Please note: This document has been produced using voice recognition software. Unrecognized errors in transcription may be present.

## 2021-04-08 NOTE — TELEPHONE ENCOUNTER
Patient requests clinical staff call to [facility], [place], [phone]. [address] [place] [address] requests conversation with clinical staff regarding patient's latest prescription for ENTRESTO.

## 2021-04-08 NOTE — TELEPHONE ENCOUNTER
[facility]. Two patient Identifiers confirmed. Spoke with pharmacy and was advised that medication needed PA.

## 2021-04-14 NOTE — TELEPHONE ENCOUNTER
Contacted pt at [facility] number. Two patient Identifiers confirmed. Advised pt per notes below. Pt verbalized understanding and stated he was able to get his medication already.

## 2021-04-19 ENCOUNTER — CLINICAL SUPPORT (OUTPATIENT)
Dept: CARDIOLOGY CLINIC | Age: 66
End: 2021-04-19

## 2021-04-19 VITALS — SYSTOLIC BLOOD PRESSURE: 121 MMHG | HEART RATE: 77 BPM | DIASTOLIC BLOOD PRESSURE: 79 MMHG

## 2021-04-19 DIAGNOSIS — I10 ESSENTIAL HYPERTENSION WITH GOAL BLOOD PRESSURE LESS THAN 140/90: Primary | ICD-10-CM

## 2021-04-19 NOTE — PROGRESS NOTES
Noni Mathis is a 78 y.o. male that is here for a blood pressure check. His current medications are listed below. Current Outpatient Medications   Medication Sig    sacubitriL-valsartan (Entresto) 49-51 mg tab tablet Take 1 Tab by mouth two (2) times a day.  cholecalciferol (VITAMIN D3) (1000 Units /25 mcg) tablet Take 1,000 Units by mouth two (2) times a day.  fluticasone propionate (Flonase Allergy Relief) 50 mcg/actuation nasal spray 2 Sprays by Both Nostrils route daily.  nitroglycerin (NITROSTAT) 0.4 mg SL tablet 0.4 mg by SubLINGual route every five (5) minutes as needed for Chest Pain. Up to 3 doses.  cetaphil (CETAPHIL) topical cream Apply  to affected area as needed (dry skin).  cetirizine (ZYRTEC) 10 mg tablet Take 1 Tab by mouth daily as needed for Allergies.  hydrALAZINE (APRESOLINE) 25 mg tablet Take 1 Tab by mouth three (3) times daily.  pantoprazole (PROTONIX) 40 mg tablet Take 1 Tab by mouth daily.  hydroCHLOROthiazide (HYDRODIURIL) 25 mg tablet Take 1 Tab by mouth daily.  atorvastatin (LIPITOR) 80 mg tablet Take 1 Tab by mouth nightly.  carvedilol (COREG) 25 mg tablet Take 0.5 Tabs by mouth two (2) times daily (with meals). 1/2 tab BID    NIFEdipine ER (ADALAT CC) 60 mg ER tablet Take 1 Tab by mouth daily.  albuterol (PROVENTIL HFA, VENTOLIN HFA, PROAIR HFA) 90 mcg/actuation inhaler Take 2 Puffs by inhalation every six (6) hours as needed for Wheezing.  acetaminophen (TYLENOL EXTRA STRENGTH) 500 mg tablet Take 2 Tabs by mouth every six (6) hours as needed for Pain.  aspirin 81 mg chewable tablet Take 1 Tab by mouth daily. Indications: MYOCARDIAL INFARCTION PREVENTION    insulin glargine (LANTUS) 100 unit/mL injection 28 Units by SubCUTAneous route nightly. pt takes 30 Units      No current facility-administered medications for this visit.                 His   Visit Vitals  /79 (BP 1 Location: Right upper arm, BP Patient Position: Sitting, BP Cuff Size: Adult)   Pulse 77

## 2021-04-22 ENCOUNTER — OFFICE VISIT (OUTPATIENT)
Dept: FAMILY MEDICINE CLINIC | Age: 66
End: 2021-04-22
Payer: MEDICARE

## 2021-04-22 ENCOUNTER — HOSPITAL ENCOUNTER (OUTPATIENT)
Dept: LAB | Age: 66
Discharge: HOME | End: 2021-04-22
Payer: MEDICARE

## 2021-04-22 VITALS
WEIGHT: 239 LBS | OXYGEN SATURATION: 98 % | BODY MASS INDEX: 33.46 KG/M2 | TEMPERATURE: 98 F | HEIGHT: 71 IN | SYSTOLIC BLOOD PRESSURE: 135 MMHG | RESPIRATION RATE: 16 BRPM | HEART RATE: 76 BPM | DIASTOLIC BLOOD PRESSURE: 75 MMHG

## 2021-04-22 DIAGNOSIS — J43.9 PULMONARY EMPHYSEMA, UNSPECIFIED EMPHYSEMA TYPE (HCC): ICD-10-CM

## 2021-04-22 DIAGNOSIS — E11.65 TYPE 2 DIABETES MELLITUS WITH HYPERGLYCEMIA, WITH LONG-TERM CURRENT USE OF INSULIN (HCC): ICD-10-CM

## 2021-04-22 DIAGNOSIS — Z71.89 ADVANCE CARE PLANNING: ICD-10-CM

## 2021-04-22 DIAGNOSIS — T17.308A CHOKING, INITIAL ENCOUNTER: ICD-10-CM

## 2021-04-22 DIAGNOSIS — N52.01 ERECTILE DYSFUNCTION DUE TO ARTERIAL INSUFFICIENCY: ICD-10-CM

## 2021-04-22 DIAGNOSIS — I82.722 CHRONIC DEEP VEIN THROMBOSIS (DVT) OF OTHER VEIN OF LEFT UPPER EXTREMITY (HCC): ICD-10-CM

## 2021-04-22 DIAGNOSIS — Z00.00 MEDICARE ANNUAL WELLNESS VISIT, SUBSEQUENT: Primary | ICD-10-CM

## 2021-04-22 DIAGNOSIS — E11.21 TYPE 2 DIABETES WITH NEPHROPATHY (HCC): ICD-10-CM

## 2021-04-22 DIAGNOSIS — I10 ESSENTIAL HYPERTENSION: ICD-10-CM

## 2021-04-22 DIAGNOSIS — Z79.4 TYPE 2 DIABETES MELLITUS WITH HYPERGLYCEMIA, WITH LONG-TERM CURRENT USE OF INSULIN (HCC): ICD-10-CM

## 2021-04-22 DIAGNOSIS — I73.9 PAD (PERIPHERAL ARTERY DISEASE) (HCC): ICD-10-CM

## 2021-04-22 LAB
CHOLEST SERPL-MCNC: 138 MG/DL
CREAT UR-MCNC: 50 MG/DL (ref 30–125)
EST. AVERAGE GLUCOSE BLD GHB EST-MCNC: 137 MG/DL
HBA1C MFR BLD: 6.4 % (ref 4.2–5.6)
HDLC SERPL-MCNC: 59 MG/DL (ref 40–60)
HDLC SERPL: 2.3 {RATIO} (ref 0–5)
LDLC SERPL CALC-MCNC: 60.8 MG/DL (ref 0–100)
LIPID PROFILE,FLP: NORMAL
MICROALBUMIN UR-MCNC: 0.81 MG/DL (ref 0–3)
MICROALBUMIN/CREAT UR-RTO: 16 MG/G (ref 0–30)
TRIGL SERPL-MCNC: 91 MG/DL (ref ?–150)
VLDLC SERPL CALC-MCNC: 18.2 MG/DL

## 2021-04-22 PROCEDURE — 82043 UR ALBUMIN QUANTITATIVE: CPT

## 2021-04-22 PROCEDURE — G8427 DOCREV CUR MEDS BY ELIG CLIN: HCPCS | Performed by: FAMILY MEDICINE

## 2021-04-22 PROCEDURE — G8417 CALC BMI ABV UP PARAM F/U: HCPCS | Performed by: FAMILY MEDICINE

## 2021-04-22 PROCEDURE — G8536 NO DOC ELDER MAL SCRN: HCPCS | Performed by: FAMILY MEDICINE

## 2021-04-22 PROCEDURE — 1101F PT FALLS ASSESS-DOCD LE1/YR: CPT | Performed by: FAMILY MEDICINE

## 2021-04-22 PROCEDURE — 80061 LIPID PANEL: CPT

## 2021-04-22 PROCEDURE — G0439 PPPS, SUBSEQ VISIT: HCPCS | Performed by: FAMILY MEDICINE

## 2021-04-22 PROCEDURE — 3017F COLORECTAL CA SCREEN DOC REV: CPT | Performed by: FAMILY MEDICINE

## 2021-04-22 PROCEDURE — 99214 OFFICE O/P EST MOD 30 MIN: CPT | Performed by: FAMILY MEDICINE

## 2021-04-22 PROCEDURE — G8510 SCR DEP NEG, NO PLAN REQD: HCPCS | Performed by: FAMILY MEDICINE

## 2021-04-22 PROCEDURE — 99497 ADVNCD CARE PLAN 30 MIN: CPT | Performed by: FAMILY MEDICINE

## 2021-04-22 PROCEDURE — 36415 COLL VENOUS BLD VENIPUNCTURE: CPT

## 2021-04-22 PROCEDURE — G8752 SYS BP LESS 140: HCPCS | Performed by: FAMILY MEDICINE

## 2021-04-22 PROCEDURE — 83036 HEMOGLOBIN GLYCOSYLATED A1C: CPT

## 2021-04-22 PROCEDURE — G8754 DIAS BP LESS 90: HCPCS | Performed by: FAMILY MEDICINE

## 2021-04-22 PROCEDURE — 3046F HEMOGLOBIN A1C LEVEL >9.0%: CPT | Performed by: FAMILY MEDICINE

## 2021-04-22 PROCEDURE — 2022F DILAT RTA XM EVC RTNOPTHY: CPT | Performed by: FAMILY MEDICINE

## 2021-04-22 RX ORDER — SILDENAFIL 50 MG/1
50 TABLET, FILM COATED ORAL AS NEEDED
Qty: 10 TAB | Refills: 12 | Status: SHIPPED | OUTPATIENT
Start: 2021-04-22 | End: 2021-08-25

## 2021-04-22 RX ORDER — SILDENAFIL 50 MG/1
50 TABLET, FILM COATED ORAL AS NEEDED
Qty: 10 TAB | Refills: 12 | Status: SHIPPED | OUTPATIENT
Start: 2021-04-22 | End: 2021-04-22 | Stop reason: SDUPTHER

## 2021-04-22 NOTE — PROGRESS NOTES
(AWV) The Medicare Annual Wellness Exam PROGRESS NOTE    This is a Medicare Annual Wellness Exam (AWV)     I have reviewed the patient's medical history in detail and updated the computerized patient record. Clau Espinoza is a 78 y.o.  male and presents for an annual wellness exam     ROS   General ROS: negative for - chills or fever  Psychological ROS: negative for - anxiety or depression  Respiratory ROS: no cough, shortness of breath, or wheezing  Cardiovascular ROS: no chest pain or dyspnea on exertion  Gastrointestinal ROS: no abdominal pain, change in bowel habits, or black or bloody stools  Genito-Urinary ROS: no dysuria, trouble voiding, or hematuria  Musculoskeletal ROS: negative for - joint pain  Neurological ROS: positive for - numbness/tingling  Dermatological ROS: positive for - dry skin    All other systems reviewed and are negative. History     Past Medical History:   Diagnosis Date    Biventricular ICD (implantable cardioverter-defibrillator) in place 07/16    [employer]    CAD (coronary artery disease)     Cardiomyopathy, ischemic     EF 30% (04/16) 30-35% (11/15)    Diabetes [facility]     DVT (deep venous thrombosis) ([facility] 08/16    L axillary vein after PPM insertion    HLD (hyperlipidemia)     Hypertension     NSTEMI (non-ST elevated myocardial infarction) ([facility]     S/P OM1 2.5 X 23 mm XIENCE (11/15)    Pulmonary emphysema ([facility]     Stroke [facility])       Past Surgical History:   Procedure Laterality Date    COLONOSCOPY N/A 12/14/2017    COLONOSCOPY performed by Katia Garcia MD at [address] HX PACEMAKER PLACEMENT      VASCULAR SURGERY PROCEDURE UNLIST      Stent placed right thigh     Current Outpatient Medications   Medication Sig Dispense Refill    sildenafil citrate (VIAGRA) 50 mg tablet Take 1 Tab by mouth as needed for Erectile Dysfunction. 10 Tab 12    sacubitriL-valsartan (Entresto) 49-51 mg tab tablet Take 1 Tab by mouth two (2) times a day.  60 Tab 5    cholecalciferol (VITAMIN D3) (1000 Units /25 mcg) tablet Take 1,000 Units by mouth two (2) times a day.  fluticasone propionate (Flonase Allergy Relief) 50 mcg/actuation nasal spray 2 Sprays by Both Nostrils route daily.  nitroglycerin (NITROSTAT) 0.4 mg SL tablet 0.4 mg by SubLINGual route every five (5) minutes as needed for Chest Pain. Up to 3 doses.  cetaphil (CETAPHIL) topical cream Apply  to affected area as needed (dry skin). 453 g 1    cetirizine (ZYRTEC) 10 mg tablet Take 1 Tab by mouth daily as needed for Allergies. 30 Tab 2    hydrALAZINE (APRESOLINE) 25 mg tablet Take 1 Tab by mouth three (3) times daily. 90 Tab 3    pantoprazole (PROTONIX) 40 mg tablet Take 1 Tab by mouth daily. 30 Tab 1    hydroCHLOROthiazide (HYDRODIURIL) 25 mg tablet Take 1 Tab by mouth daily. 90 Tab 3    atorvastatin (LIPITOR) 80 mg tablet Take 1 Tab by mouth nightly. 30 Tab 5    carvedilol (COREG) 25 mg tablet Take 0.5 Tabs by mouth two (2) times daily (with meals). 1/2 tab BID 30 Tab 3    NIFEdipine ER (ADALAT CC) 60 mg ER tablet Take 1 Tab by mouth daily. 90 Tab 3    albuterol (PROVENTIL HFA, VENTOLIN HFA, PROAIR HFA) 90 mcg/actuation inhaler Take 2 Puffs by inhalation every six (6) hours as needed for Wheezing. 1 Inhaler 3    acetaminophen (TYLENOL EXTRA STRENGTH) 500 mg tablet Take 2 Tabs by mouth every six (6) hours as needed for Pain. 50 Tab 0    aspirin 81 mg chewable tablet Take 1 Tab by mouth daily. Indications: MYOCARDIAL INFARCTION PREVENTION 100 Tab 5    insulin glargine (LANTUS) 100 unit/mL injection 28 Units by SubCUTAneous route nightly.  pt takes 30 Units        Allergies   Allergen Reactions    Lasix [Furosemide] Other (comments)    Levofloxacin Rash    Penicillins Swelling     Family History   Problem Relation Age of Onset    Heart Disease Mother     Heart Disease Father      Social History     Tobacco Use    Smoking status: Former Smoker     Packs/day: 1.50     Years: 30.00 Pack years: 45.00     Types: Cigarettes     Quit date: 2015     Years since quittin.4    Smokeless tobacco: Never Used   Substance Use Topics    Alcohol use: No     Alcohol/week: 0.0 standard drinks     Patient Active Problem List   Diagnosis Code    Hypertension I10    Type II or unspecified type diabetes mellitus without mention of complication, not stated as uncontrolled E11.9    Acute lacunar stroke ([facility] I63.81    HLD (hyperlipidemia) E78.5    ACS (acute coronary syndrome) ([facility] I24.9    Elevated troponin R77.8    Hearing impairment H91.90    Coronary artery disease involving native coronary artery without angina pectoris I25.10    AICD (automatic cardioverter/defibrillator) present Z95.810    Emphysema of lung ([facility]) J43.9    Ex-smoker Z87.891    Restrictive ventilatory defect R94.2    Obesity (BMI 30-39. 9) E66.9    Chronic combined systolic and diastolic heart failure ([facility]) I50.42    Deep venous thrombosis ([facility]) I82.409    Hypercholesteremia E78.00    Type 2 diabetes mellitus with diabetic neuropathy, with long-term current use of insulin ([facility]) E11.40, Z79.4    Deep vein thrombosis (DVT) of left upper extremity ([facility]) I82.622    PAD (peripheral artery disease) ([facility]) I73.9    Essential hypertension I10    Coronary artery disease involving native coronary artery of native heart without angina pectoris I25.10    Type 2 diabetes with nephropathy ([facility]) E11.21    Advance care planning Z71.89       Health Maintenance History  Immunizations reviewed, dtap up to date , pneumovax up to date, flu up to date, zoster up to date  Colonoscopy: up to date,   Eye exam:due  covid up to date        Depression Risk Factor Screening:      Patient Health Questionnaire (PHQ-2)   Over the last 2 weeks, how often have you been bothered by any of the following problems? · Little interest or pleasure in doing things? · Not at all. [0]  · Feeling down, depressed, or hopeless? · Not at all. [0]    Total Score: 0/6  PHQ-2 Assessment Scoring:   A score of 2 or more requires further screening with the PHQ-9    Alcohol Risk Factor Screening:     Men: On any occasion during the past 3 months, have you had more than 4 drinks containing alcohol?  no  Do you average more than 14 drinks per week?  no    Functional Ability and Level of Safety:     Hearing Loss    The patient wears hearing aids. Activities of Daily Living   Self-care. Requires assistance with: no ADLs    Fall Risk   Impaired (20 pts)  Score: 20    Abuse Screen   Patient is not abused    Examination   Physical Examination  Vitals:    04/22/21 0934   BP: 135/75   Pulse: 76   Resp: 16   Temp: 98 °F (36.7 °C)   TempSrc: Temporal   SpO2: 98%   Weight: 239 lb (108.4 kg)   Height: 5' 11\" (1.803 m)   PainSc:   0 - No pain      Body mass index is 33.33 kg/m². Evaluation of Cognitive Function:  Mood/affect:good mood with appropriate affect  Appearance: well kempt  Family member/caregiver input: n/a    alert, well appearing, and in no distress, oriented to person, place, and time and obese    Patient Care Team:  Jeancarlos Alcantara MD as PCP - General (Family Medicine)  Jeancarlos Alcantara MD as PCP - [facility] Empaneled Provider  Chrissy Powers MD (Cardiology)  Jim Wlilis NP (Nurse Practitioner)  Alfa Lorenzana MD (Surgery)    End-of-life planning  Advanced Directive in the case than an injury or illness causes the patient to be unable to make health care decisions    Health Care Directive or Living Will: no    Advice/Referrals/Counselling/Plan:   Education and counseling provided:  End-of-Life planning (with patient's consent)  Pneumococcal Vaccine  Medical nutrition therapy for individuals with diabetes or renal disease  covid  Include in education list (weight loss, physical activity, smoking cessation, fall prevention, and nutrition)    ICD-10-CM ICD-9-CM    1.  Medicare annual wellness visit, subsequent  Z00.00 V70.0 [address] [place] [employer] 2. Pulmonary emphysema, unspecified emphysema type ([facility]  J43.9 492.8    3. Type 2 diabetes with nephropathy ([facility])  E11.21 250.40      583.81    4. Chronic deep vein thrombosis (DVT) of other vein of left upper extremity ([facility])  I82.722 453.72    5. Type 2 diabetes mellitus with hyperglycemia, with long-term current use of insulin ([facility])  E11.65 250.00 LIPID PANEL    Z79.4 790.29  DIABETES FOOT EXAM     V58.67 HEMOGLOBIN A1C WITH EAG      MICROALBUMIN, UR, RAND W/ MICROALB/CREAT RATIO   6. Essential hypertension  I10 401.9    7. PAD (peripheral artery disease) ([facility])  I73.9 443.9    8. Advance care planning  Z71.89 V65.49 ADVANCE CARE PLANNING FIRST 30 MINS   9. Erectile dysfunction due to arterial insufficiency  N52.01 607.84 sildenafil citrate (VIAGRA) 50 mg tablet   . Brief written plan, checklist    I have discussed the diagnosis with the patient and the intended plan as seen in the above orders. The patient has received an after-visit summary and questions were answered concerning future plans. I have discussed medication side effects and warnings with the patient as well. I have reviewed the plan of care with the patient, accepted their input and they are in agreement with the treatment goals. ____________________________________________________________    Problem Assessment    for treatment of   Chief Complaint   Patient presents with    Annual Wellness Visit         SUBJECTIVE  He is following up after choking incident; he has meat caught in his throat. He finally vomited and felt better; he called emt and he was taken to [facility]. Well Adult Physical   Patient here for a comprehensive physical exam.The patient reports problems - obesity, diabetes, hypertension, erectile dysfunction and copd  Do you take any herbs or supplements that were not prescribed by a doctor? no Are you taking calcium supplements? no Are you taking aspirin daily?  yes  Diabetes Mellitus:  He has diabetes mellitus, and hypertension, hyperlipidemia and obesity. Diabetic ROS - medication compliance: compliant all of the time, diabetic diet compliance: compliant most of the time. Lab review: orders written for new lab studies as appropriate; see orders. Cardiovascular Review:  The patient has hypertension, hyperlipidemia, coronary artery disease, obesity and pacemaker. Diet and Lifestyle: generally follows a low fat low cholesterol diet, generally follows a low sodium diet, follows a diabetic diet regularly, exercises sporadically, smoker 1/2 pack per day  Home BP Monitoring: is not measured at home. Pertinent ROS: taking medications as instructed, no medication side effects noted, no TIA's, no chest pain on exertion, no dyspnea on exertion, no swelling of ankles.        COPD Review:  The patient is being seen for follow up of COPD. Oxygen: He currently is not on home oxygen therapy. Symptoms: chronic dyspnea: severity = mild: course of sx: gradually worsening. Patient uses 2 pillows at night. Patient does not smoke cigarettes. Visit Vitals  /75 (BP 1 Location: Right arm, BP Patient Position: Sitting, BP Cuff Size: Adult)   Pulse 76   Temp 98 °F (36.7 °C) (Temporal)   Resp 16   Ht 5' 11\" (1.803 m)   Wt 239 lb (108.4 kg)   SpO2 98%   BMI 33.33 kg/m²     General:  Alert, cooperative, no distress, appears stated age. Head:  Normocephalic, without obvious abnormality, atraumatic. Eyes:  Conjunctivae/corneas clear. PERRL, EOMs intact. Ears:  Normal TMs and external ear canals both ears. Nose: Nares normal. Septum midline. Mucosa normal. No drainage or sinus tenderness. Throat: Lips, mucosa, and tongue normal. Teeth and gums normal.   Neck: Supple, symmetrical, trachea midline, no adenopathy, thyroid: no enlargement/tenderness/nodules, no carotid bruit and no JVD. Back:   Symmetric, no curvature. ROM normal. No CVA tenderness. Lungs:   Clear to auscultation bilaterally.    Chest wall:  No tenderness or deformity. Heart:  Regular rate and rhythm, S1, S2 normal, no murmur, click, rub or gallop. Abdomen:   Soft, non-tender. Bowel sounds normal. No masses,  No organomegaly. Genitalia:  deferred   Rectal:  deferred   Extremities: Extremities normal, atraumatic, no cyanosis or edema. Pulses: 2+ and symmetric all extremities. Skin: Skin color, texture, turgor normal. No rashes or lesions   Lymph nodes: Cervical, supraclavicular, and axillary nodes normal.   Neurologic: CNII-XII intact. Normal strength, sensation and reflexes throughout. Diabetic foot exam:     Left Foot:   Visual Exam: normal    Pulse DP: 1+ (weak)   Filament test: reduced sensation       Right Foot:   Visual Exam: normal    Pulse DP: 1+ (weak)   Filament test: reduced sensation     LABS     TESTS      Assessment/Plan:    1. Pulmonary emphysema, unspecified emphysema type ([facility]  Continue [facility] therapy; [facility]    2. Type 2 diabetes with nephropathy ([facility])  Goal hgb a1c <7; avoid nephrotoxic medications    3. Chronic deep vein thrombosis (DVT) of other vein of left upper extremity ([facility])  Continue aspirin    4. Medicare annual wellness visit, subsequent  Reviewed preventive recommendations  - 19855 [employer]    5. Type 2 diabetes mellitus with hyperglycemia, with long-term current use of insulin ([facility])  Encourage healthy diet and exercise  - LIPID PANEL; Future  -  DIABETES FOOT EXAM  - HEMOGLOBIN A1C WITH EAG; Future  - MICROALBUMIN, UR, RAND W/ MICROALB/CREAT RATIO; Future    6. Essential hypertension  Goal <130/80    7. PAD (peripheral artery disease) ([facility])  No claudication; continue current management    8. Advance care planning  See ACP  - ADVANCE CARE PLANNING FIRST 30 MINS    9. Erectile dysfunction due to arterial insufficiency    - sildenafil citrate (VIAGRA) 50 mg tablet; Take 1 Tab by mouth as needed for Erectile Dysfunction. Dispense: 10 Tab; Refill: 12    10.  Choking, initial encounter  Needs EGD  - REFERRAL TO GASTROENTEROLOGY    Lab review: orders written for new lab studies as appropriate; see orders

## 2021-04-22 NOTE — PROGRESS NOTES
Noni Mathis presents today for   Chief Complaint   Patient presents with    Annual Wellness Visit       Is someone accompanying this pt? no    Is the patient using any DME equipment during OV? no    Depression Screening:  3 most recent PHQ Screens 4/22/2021   Little interest or pleasure in doing things Not at all   Feeling down, depressed, irritable, or hopeless Not at all   Total Score PHQ 2 0       Learning Assessment:  Learning Assessment 3/16/2017   PRIMARY LEARNER Patient   HIGHEST LEVEL OF EDUCATION - PRIMARY LEARNER  DID NOT GRADUATE [facility] PRIMARY LEARNER NONE   CO-LEARNER CAREGIVER No   PRIMARY LANGUAGE ENGLISH   LEARNER PREFERENCE PRIMARY DEMONSTRATION   ANSWERED BY patient   RELATIONSHIP SELF       Abuse Screening:  Abuse Screening Questionnaire 9/15/2020   Do you ever feel afraid of your partner? N   Are you in a relationship with someone who physically or mentally threatens you? N   Is it safe for you to go home? Y       Fall Risk  Fall Risk Assessment, last 12 mths 4/22/2021   Able to walk? Yes   Fall in past 12 months? 0   Do you feel unsteady? 0   Are you worried about falling 0       Health Maintenance reviewed and discussed and ordered per Provider. Health Maintenance Due   Topic Date Due    Eye Exam Retinal or Dilated  Never done    COVID-19 Vaccine (1) Never done    Shingrix Vaccine Age 50> (1 of 2) Never done    Medicare Yearly Exam  10/04/2020    Foot Exam Q1  10/04/2020    Pneumococcal 65+ years (2 of 2 - PPSV23) 10/04/2020    A1C test (Diabetic or Prediabetic)  03/09/2021    Lipid Screen  03/09/2021    MICROALBUMIN Q1  03/18/2021   . Coordination of Care:  1. Have you been to the ER, urgent care clinic since your last visit? Hospitalized since your last visit? Yes, 4/8/21, SNG, throat obstruction    2. Have you seen or consulted any other health care providers outside of the [address] [place] [address] since your last visit?  Include any pap smears or colon screening.  no      Last  Checked na  Last UDS Checked na  Last Pain contract signed: na    Annual Medicare Wellness Exam

## 2021-04-22 NOTE — ACP (ADVANCE CARE PLANNING)
Advance Care Planning     Advance Care Planning (ACP) Physician/NP/PA Conversation      Date of Conversation: 4/22/2021  Conducted with: Patient with Decision Making Capacity    Healthcare Decision Maker:     Primary Decision Maker: Warren Casas - Sister - [phone]    Secondary Decision Maker: Lorena Moran (Btr N Law) - Other Relative - [phone]  Click here to complete [address] including selection of the Healthcare Decision Maker Relationship (ie \"Primary\")  Today we documented Decision Maker(s) consistent with Legal Next of Kin hierarchy. Care Preferences:    Hospitalization: \"If your health worsens and it becomes clear that your chance of recovery is unlikely, what would be your preference regarding hospitalization? \"  The patient would prefer hospitalization. Ventilation: \"If you were unable to breathe on your own and your chance of recovery was unlikely, what would be your preference about the use of a ventilator (breathing machine) if it was available to you? \"   The patient would desire the use of a ventilator. Resuscitation: \"In the event your heart stopped as a result of an underlying serious health condition, would you want attempts to be made to restart your heart, or would you prefer a natural death? \"   Yes, attempt to resuscitate.     Additional topics discussed: treatment goals, benefit/burden of treatment options, ventilation preferences, hospitalization preferences and resuscitation preferences    Conversation Outcomes / Follow-Up Plan:   ACP in process - information provided, considering goals and options  Reviewed DNR/DNI and patient elects Full Code (Attempt Resuscitation)     Length of Voluntary ACP Conversation in minutes:  16 minutes    Jose Mercer MD

## 2021-04-26 ENCOUNTER — TELEPHONE (OUTPATIENT)
Dept: CARDIOLOGY CLINIC | Age: 66
End: 2021-04-26

## 2021-04-26 NOTE — TELEPHONE ENCOUNTER
Patient believes that his new medication [employer]raw Manual is raising his blood pressure. Patient would like call from nurse to discuss.

## 2021-04-26 NOTE — TELEPHONE ENCOUNTER
Contacted pt at [facility] number. Two patient Identifiers confirmed. Advised 150/103 this morning after he took the medication. Pt stated he did not take the second dosage of entresto. Pt took bp while on phone and advised his BP was 126/90 P 92. Advised pt Dr Rene Abdullahi was not in office today. Pt verbalized understanding and scheduled 5/4/21. Will review with Dr Rene Abdullahi.

## 2021-05-04 DIAGNOSIS — I42.0 DILATED CARDIOMYOPATHY (HCC): Primary | ICD-10-CM

## 2021-05-04 DIAGNOSIS — I25.10 CORONARY ARTERY DISEASE DUE TO LIPID RICH PLAQUE: ICD-10-CM

## 2021-05-04 DIAGNOSIS — I63.81 ACUTE LACUNAR STROKE (HCC): ICD-10-CM

## 2021-05-04 DIAGNOSIS — I24.9 ACS (ACUTE CORONARY SYNDROME) (HCC): ICD-10-CM

## 2021-05-04 DIAGNOSIS — I25.83 CORONARY ARTERY DISEASE DUE TO LIPID RICH PLAQUE: ICD-10-CM

## 2021-05-04 PROCEDURE — 93284 PRGRMG EVAL IMPLANTABLE DFB: CPT | Performed by: INTERNAL MEDICINE

## 2021-05-05 ENCOUNTER — TELEPHONE (OUTPATIENT)
Dept: CARDIOLOGY CLINIC | Age: 66
End: 2021-05-05

## 2021-05-05 NOTE — LETTER
5/5/2021 Mr. Vini Woodard [address] [facility] [address] [phone] Re:  Appointment Dear Marylee Abed: 
 
I hope this letter finds you well. I have attempted to contact you via phone but was unsuccessful. After review of your pacer report Dr Josue Harry would like you to call us to schedule a follow up appointment to discuss. Please contact our office at the number listed above to appointment. Sincerely, Yue Osuna LPN

## 2021-05-05 NOTE — TELEPHONE ENCOUNTER
----- Message from Yamile Norwood MD sent at 5/5/2021  7:53 AM EDT -----  Regarding: RE: please review pacer ( scanned 5/4/21)  Looks like paf and nsvt episode. Casanova Dary  ----- Message -----  From: Irene Cardona LPN  Sent: 6/7/7216   2:12 PM EDT  To: Yamile Norwood MD, Ruma Schneider MD  Subject: please review pacer ( scanned 5/4/21)            Good afternoon,    I hope this e-mail finds you well. Verbal order and read back per Dr Roberto Blanc. Please review atrial and ventricular high rate episodes on pacer check on 4/20/21.        Chloe Becerra LPN

## 2021-05-10 ENCOUNTER — TELEPHONE (OUTPATIENT)
Dept: CARDIOLOGY CLINIC | Age: 66
End: 2021-05-10

## 2021-05-13 ENCOUNTER — PATIENT OUTREACH (OUTPATIENT)
Dept: CASE MANAGEMENT | Age: 66
End: 2021-05-13

## 2021-05-13 NOTE — PROGRESS NOTES
Complex Case Management      Date/Time:  2021 12:35 PM    Method of communication with patient:phone    [facility] ([facility]) contacted the patient by telephone to perform Ambulatory Care Coordination. Verified name and  (PHI) with patient as identifiers. Provided introduction to self, and explanation of the Ambulatory Care Manager's role. Reviewed most recent clinic visit w/ patient who verbalized understanding. Patient given an opportunity to ask questions. Top Challenges reviewed with the patient   1. Wishes to lose weight in order to maintain healthy B/P and glucose   2. States he has not heard from GI office but will call to see if the referral was received. The patient agrees to contact the PCP office or the [facility] for questions related to their healthcare. Provided contact information for future reference. Disease Specific:   COPD, CHF  Weight on 21 was 228 lbs. Denies shortness of breath, chest pain or edema at this time. Home Health Active: No    DME Active: No    Barriers to care? None noted at present time    Advance Care Planning:   Does patient have an Advance Directive:  reviewed and current     Medication(s):   Medication reconciliation was performed with patient, who verbalizes understanding of administration of home medications. There were no barriers to obtaining medications identified at this time. Referral to Pharm D needed: no     Current Outpatient Medications   Medication Sig    sacubitriL-valsartan (Entresto) 49-51 mg tab tablet Take 1 Tab by mouth two (2) times a day.  cholecalciferol (VITAMIN D3) (1000 Units /25 mcg) tablet Take 1,000 Units by mouth two (2) times a day.  fluticasone propionate (Flonase Allergy Relief) 50 mcg/actuation nasal spray 2 Sprays by Both Nostrils route daily.  nitroglycerin (NITROSTAT) 0.4 mg SL tablet 0.4 mg by SubLINGual route every five (5) minutes as needed for Chest Pain. Up to 3 doses.     cetaphil (CETAPHIL) topical cream Apply  to affected area as needed (dry skin).  cetirizine (ZYRTEC) 10 mg tablet Take 1 Tab by mouth daily as needed for Allergies.  hydrALAZINE (APRESOLINE) 25 mg tablet Take 1 Tab by mouth three (3) times daily.  pantoprazole (PROTONIX) 40 mg tablet Take 1 Tab by mouth daily.  hydroCHLOROthiazide (HYDRODIURIL) 25 mg tablet Take 1 Tab by mouth daily.  atorvastatin (LIPITOR) 80 mg tablet Take 1 Tab by mouth nightly.  carvedilol (COREG) 25 mg tablet Take 0.5 Tabs by mouth two (2) times daily (with meals). 1/2 tab BID    NIFEdipine ER (ADALAT CC) 60 mg ER tablet Take 1 Tab by mouth daily.  albuterol (PROVENTIL HFA, VENTOLIN HFA, PROAIR HFA) 90 mcg/actuation inhaler Take 2 Puffs by inhalation every six (6) hours as needed for Wheezing.  aspirin 81 mg chewable tablet Take 1 Tab by mouth daily. Indications: MYOCARDIAL INFARCTION PREVENTION    insulin glargine (LANTUS) 100 unit/mL injection 28 Units by SubCUTAneous route nightly.  sildenafil citrate (VIAGRA) 50 mg tablet Take 1 Tab by mouth as needed for Erectile Dysfunction.  acetaminophen (TYLENOL EXTRA STRENGTH) 500 mg tablet Take 2 Tabs by mouth every six (6) hours as needed for Pain. No current facility-administered medications for this visit.         [facility] follow up appointment(s):   Future Appointments   Date Time Provider Deondre Antonio   5/18/2021  1:45 PM LO KRAUSE [facility] BS AMB   5/18/2021  2:30 PM Jemima Junior MD [facility] BS AMB   8/18/2021  3:50 PM CSI, PACER HV [facility] BS AMB   1/18/2022 11:00 AM Jemima Junior MD [facility] BS AMB        Non-[facility] follow up appointment(s): NA    Goals Addressed                 This Visit's Progress     I want to lose weight (pt-stated)        Patient has goal to lose 40 lbs by December 2021    Patient will begin exercise program - will walk in the mall daily  Will eat heart healthy diet- no added salt, increase fruits and vegetables  Will eat ADA diet - avoid concentrated sweets

## 2021-05-18 ENCOUNTER — OFFICE VISIT (OUTPATIENT)
Dept: CARDIOLOGY CLINIC | Age: 66
End: 2021-05-18

## 2021-05-18 ENCOUNTER — TELEPHONE (OUTPATIENT)
Dept: CARDIOLOGY CLINIC | Age: 66
End: 2021-05-18

## 2021-05-18 ENCOUNTER — OFFICE VISIT (OUTPATIENT)
Dept: CARDIOLOGY CLINIC | Age: 66
End: 2021-05-18
Payer: MEDICARE

## 2021-05-18 VITALS
HEART RATE: 74 BPM | BODY MASS INDEX: 32.2 KG/M2 | TEMPERATURE: 98 F | RESPIRATION RATE: 18 BRPM | SYSTOLIC BLOOD PRESSURE: 138 MMHG | WEIGHT: 230 LBS | DIASTOLIC BLOOD PRESSURE: 90 MMHG | OXYGEN SATURATION: 97 % | HEIGHT: 71 IN

## 2021-05-18 DIAGNOSIS — Z95.810 AICD (AUTOMATIC CARDIOVERTER/DEFIBRILLATOR) PRESENT: ICD-10-CM

## 2021-05-18 DIAGNOSIS — I48.0 PAF (PAROXYSMAL ATRIAL FIBRILLATION) (HCC): ICD-10-CM

## 2021-05-18 DIAGNOSIS — E78.00 PURE HYPERCHOLESTEROLEMIA: ICD-10-CM

## 2021-05-18 DIAGNOSIS — I42.9 CARDIOMYOPATHY, UNSPECIFIED TYPE (HCC): ICD-10-CM

## 2021-05-18 DIAGNOSIS — I10 ESSENTIAL HYPERTENSION: ICD-10-CM

## 2021-05-18 DIAGNOSIS — I50.42 CHRONIC COMBINED SYSTOLIC AND DIASTOLIC HEART FAILURE (HCC): Primary | ICD-10-CM

## 2021-05-18 DIAGNOSIS — I48.91 ATRIAL FIBRILLATION, UNSPECIFIED TYPE (HCC): ICD-10-CM

## 2021-05-18 DIAGNOSIS — I25.10 CORONARY ARTERY DISEASE INVOLVING NATIVE CORONARY ARTERY OF NATIVE HEART WITHOUT ANGINA PECTORIS: Primary | ICD-10-CM

## 2021-05-18 PROCEDURE — 93284 PRGRMG EVAL IMPLANTABLE DFB: CPT | Performed by: INTERNAL MEDICINE

## 2021-05-18 PROCEDURE — G8432 DEP SCR NOT DOC, RNG: HCPCS | Performed by: INTERNAL MEDICINE

## 2021-05-18 PROCEDURE — G8417 CALC BMI ABV UP PARAM F/U: HCPCS | Performed by: INTERNAL MEDICINE

## 2021-05-18 PROCEDURE — 1101F PT FALLS ASSESS-DOCD LE1/YR: CPT | Performed by: INTERNAL MEDICINE

## 2021-05-18 PROCEDURE — 99214 OFFICE O/P EST MOD 30 MIN: CPT | Performed by: INTERNAL MEDICINE

## 2021-05-18 PROCEDURE — 3017F COLORECTAL CA SCREEN DOC REV: CPT | Performed by: INTERNAL MEDICINE

## 2021-05-18 PROCEDURE — G8752 SYS BP LESS 140: HCPCS | Performed by: INTERNAL MEDICINE

## 2021-05-18 PROCEDURE — G8536 NO DOC ELDER MAL SCRN: HCPCS | Performed by: INTERNAL MEDICINE

## 2021-05-18 PROCEDURE — G8428 CUR MEDS NOT DOCUMENT: HCPCS | Performed by: INTERNAL MEDICINE

## 2021-05-18 PROCEDURE — G8755 DIAS BP > OR = 90: HCPCS | Performed by: INTERNAL MEDICINE

## 2021-05-18 NOTE — TELEPHONE ENCOUNTER
Attempted to contact [facility]  at  number, no answer. Lvm per Dr Aura Fajardo advised to contact office and advise if change was cheaper.

## 2021-05-18 NOTE — TELEPHONE ENCOUNTER
PCP: Cyndy Drake MD    Last appt: 5/18/2021  Future Appointments   Date Time Provider Deondre Antonio   6/16/2021  1:30 PM CSI [facility] ECHO 1 [facility] AMB   8/18/2021  3:50 PM CSI, PACER HV [facility]   1/18/2022 11:00 AM Aundrea Hurd MD [facility]       Requested Prescriptions     Pending Prescriptions Disp Refills    apixaban (ELIQUIS) 5 mg tablet 60 Tab 5     Sig: Take 1 Tab by mouth two (2) times a day. Other Comments:  Patient requested mediction be printed to be faxed to [place].

## 2021-05-18 NOTE — PROGRESS NOTES
[facility]    Mr. Nesha Laguerre is a 78 y.o. male with a history of coronary artery disease, status post OM1 stent in November, 2015, diabetes, hypertension, stroke, hyperlipidemia, cardiomyopathy s/p BiV ICD in 07/16    Mr. Nesha Laguerre is here today for follow up appointment. Denies any resting or exertional chest pain or chest pressure to suggest angina or any dyspnea to suggest heart failure. No presyncope or syncope  Denies any PND or LE edema. He is unaware of the palpitations. He had a event monitor to check last month which showed episode of atrial fibrillation, asymptomatic. He denies any hematuria, hematemesis, history of intracranial or intra-abdominal bleeding. Taking all medications regularly. He does not know the names of medication he is taking. He will go home and call us back. Past Medical History:   Diagnosis Date    Biventricular ICD (implantable cardioverter-defibrillator) in place 07/16    [employer]    CAD (coronary artery disease)     Cardiomyopathy, ischemic     EF 30% (04/16) 30-35% (11/15)    Diabetes ([address]     DVT (deep venous thrombosis) ([address] 08/16    L axillary vein after PPM insertion    HLD (hyperlipidemia)     Hypertension     NSTEMI (non-ST elevated myocardial infarction) ([address]     S/P OM1 2.5 X 23 mm XIENCE (11/15)    [facility]monary emphysema [facility]     Stroke [facility])          Past Surgical History:   Procedure Laterality Date    COLONOSCOPY N/A 12/14/2017    COLONOSCOPY performed by Stevie Blanton MD at [address] HX PACEMAKER PLACEMENT      VASCULAR SURGERY PROCEDURE UNLIST      Stent placed right thigh       Current Outpatient Medications   Medication Sig    sildenafil citrate (VIAGRA) 50 mg tablet Take 1 Tab by mouth as needed for Erectile Dysfunction.  sacubitriL-valsartan (Entresto) 49-51 mg tab tablet Take 1 Tab by mouth two (2) times a day.     cholecalciferol (VITAMIN D3) (1000 Units /25 mcg) tablet Take 1,000 Units by mouth two (2) times a day.  fluticasone propionate (Flonase Allergy Relief) 50 mcg/actuation nasal spray 2 Sprays by Both Nostrils route daily.  nitroglycerin (NITROSTAT) 0.4 mg SL tablet 0.4 mg by SubLINGual route every five (5) minutes as needed for Chest Pain. Up to 3 doses.  cetaphil (CETAPHIL) topical cream Apply  to affected area as needed (dry skin).  cetirizine (ZYRTEC) 10 mg tablet Take 1 Tab by mouth daily as needed for Allergies.  hydrALAZINE (APRESOLINE) 25 mg tablet Take 1 Tab by mouth three (3) times daily.  pantoprazole (PROTONIX) 40 mg tablet Take 1 Tab by mouth daily.  hydroCHLOROthiazide (HYDRODIURIL) 25 mg tablet Take 1 Tab by mouth daily.  atorvastatin (LIPITOR) 80 mg tablet Take 1 Tab by mouth nightly.  carvedilol (COREG) 25 mg tablet Take 0.5 Tabs by mouth two (2) times daily (with meals). 1/2 tab BID    NIFEdipine ER (ADALAT CC) 60 mg ER tablet Take 1 Tab by mouth daily.  albuterol (PROVENTIL HFA, VENTOLIN HFA, PROAIR HFA) 90 mcg/actuation inhaler Take 2 Puffs by inhalation every six (6) hours as needed for Wheezing.  acetaminophen (TYLENOL EXTRA STRENGTH) 500 mg tablet Take 2 Tabs by mouth every six (6) hours as needed for Pain.  aspirin 81 mg chewable tablet Take 1 Tab by mouth daily. Indications: MYOCARDIAL INFARCTION PREVENTION    insulin glargine (LANTUS) 100 unit/mL injection 28 Units by SubCUTAneous route nightly. No current facility-administered medications for this visit.         Allergies and Sensitivities:  Allergies   Allergen Reactions    Lasix [Furosemide] Other (comments)    Levofloxacin Rash    Penicillins Swelling       Family History:  Family History   Problem Relation Age of Onset    Heart Disease Mother     Heart Disease Father        Social History:  Social History     Tobacco Use    Smoking status: Former Smoker     Packs/day: 1.50     Years: 30.00     Pack years: 45.00     Types: Cigarettes     Quit date: 2015     Years since quittin.5    Smokeless tobacco: Never Used   Substance Use Topics    Alcohol use: No     Alcohol/week: 0.0 standard drinks    Drug use: No     He  reports that he quit smoking about 5 years ago. His smoking use included cigarettes. He has a 45.00 pack-year smoking history. He has never used smokeless tobacco.  He  reports no history of alcohol use. Review of Systems:  Cardiac symptoms as noted above in HPI. All others negative. Physical Exam:  BP Readings from Last 3 Encounters:   21 (!) 138/90   21 135/75   21 121/79         Pulse Readings from Last 3 Encounters:   21 74   21 76   21 77          Wt Readings from Last 3 Encounters:   21 104.3 kg (230 lb)   21 108.4 kg (239 lb)   21 107 kg (236 lb)       Constitutional: Oriented to person, place, and time. HENT: Head: Normocephalic and atraumatic. Neck: No JVD present. Cardiovascular: Regular rhythm. No murmur, gallop or rubs appreciated  Lung: Breath sounds normal. No respiratory distress. No ronchi or rales appreciated  Abdominal: No tenderness. No rebound and no guarding. Musculoskeletal: There is no lower extremity edema. No cynosis.     Review of Data  LABS:   Lab Results   Component Value Date/Time    Sodium 140 2019 01:44 PM    Potassium 3.6 2019 01:44 PM    Chloride 106 2019 01:44 PM    CO2 26 2019 01:44 PM    Glucose 130 (H) 2019 01:44 PM    BUN 20 (H) 2019 01:44 PM    Creatinine 1.24 2019 01:44 PM     Lipids Latest Ref Rng & Units 2021 3/9/2020 2019 2018 10/26/2017   Chol, Total <200 MG/ 169 175 157 170   HDL 40 - 60 MG/DL 59 71(H) 65(H) 61 57   LDL 0 - 100 MG/DL 60.8 83.2 75 76 89   Trig <150 MG/DL 91 74 175(H) 98 120   Chol/HDL Ratio 0 - 5.0   2.3 2.4 2.7 - -   Some recent data might be hidden     Lab Results   Component Value Date/Time    ALT (SGPT) 40 2019 01:44 PM     Lab Results   Component Value Date/Time    Hemoglobin A1c 6.4 (H) 04/22/2021 10:07 AM    Hemoglobin A1c (POC) 6.6 03/09/2020 02:03 PM       EKG    ECHO (11/15)  Left ventricle: The ventricle was dilated. Systolic function was moderately reduced. Ejection fraction was estimated in the range of 30 %  to 35 %. There was mild diffuse hypokinesis. There was severe hypokinesis of inferior/inferolateral wall. Wall thickness was mildly increased. Doppler parameters were consistent with abnormal left ventricular relaxation (grade 1 diastolic dysfunction). Right ventricle: Systolic function was normal.  Aortic valve: There was no stenosis. ECHO (04/16)  Left ventricle: Size was at the upper limits of normal. Systolic function was moderately to markedly reduced by visual assessment. Ejection fraction  was estimated to be 30 %. There was severe hypokinesis of the basal-mid inferior and basal-mid inferolateral wall(s). Wall thickness was mildly  increased. Doppler parameters were consistent with abnormal left ventricular relaxation (grade 1 diastolic dysfunction). Right ventricle: Systolic function was normal. Estimated peak pressure was in the range of 30 mmHg to 35 mmHg. CATHETERIZATION (11/15)  - LVEF estimated to be 30% with diffuse hypokinesis. No significant mitral regurgitation was noted. - LM: calcification, 20% distal   - LAD had diffuse 50% stenosis in its midportion of the vessel with only luminal irregularities in the proximal LAD. There was a  discrete 50% distal vessel stenosis, as well. The diagonal branches had mild disease.   - LCx: Circumflex had an ostial 50-60% stenosis,   - OM1: 100% thrombotic occlusion proximally. There was very faint collateral filling from the RCA. - RCA: Prox  50% diffuse, 40% distal disease. The right PDA had an 80% ostial stenosis and then a 90% mid vessel stenosis. The right  posterolateral branch had a 90% stenosis in a sub branch.   4. PCI: 100% OM-1 stenosis reduced to 0%. drug-eluting stent, Xience 2.5 x 23 mm     IMPRESSION & PLAN:  Mr. Daniel Rawls is a 78 y.o. male with cardiomyopathy, coronary artery disease, hypertension, hyperlipidemia. Coronary artery disease:  Mr. Daniel Rawls had an OM1 stent in November, 2015. No angina. No use of S/L NTG since last visit. Continue with medical management, including aspirin lifelong. He is also on Coreg and Lipitor. Cardiomyopathy:    His initial ejection fraction in a setting of NSTEMI was 30-35% in November 2015. A repeat EF remains 30% in April 2016, despite being on appropriate medical management. S/P Bi-V AICD placement by  in 07/16. Continue current medication   no ICD shock. Continue Coreg. And Entresto. Will check echocardiogram to see if EF is improved after starting Entresto. Now he also has been found to have paroxysmal atrial fibrillation on device check    Atrial fibrillation: This was diagnosed during routine AICD interrogation in 04/2020. Repeat interrogation was performed today and there was no evidence of atrial fibrillation episodes since last device check  Discussed diagnosis of atrial fibrillation with the patient in detail. Medical management discussed with the patient. Discussed about a.fib and stroke prophylaxis. ASA vs. Anticoagulation ( with coumadin / newer agent ). RIsk, benefit, side effects of medications and alternatives were discussed with patient regarding each medications. After lengthy discussion, he would like to start anticoagulation for stroke prophylaxis. He is also on aspirin because of his history of CAD. He understands the potential risk of bleeding. According to discussion he would like to continue his medications. Hypertension:  BP is 138/90 mm hg. Continue same meds. Hyperlipidemia:  He is on Atorvastatin 80 mg daily. Continue same. Last LDL 83    This plan was discussed with patient who is in agreement.     Thank you for allowing me to participate in patient care. Please feel free to call me if you have any question or concern. Alon Kingsley MD  Please note: This document has been produced using voice recognition software. Unrecognized errors in transcription may be present.

## 2021-05-18 NOTE — TELEPHONE ENCOUNTER
Patient called and stated he would like alternative to [facility]. Medication is too expensive for him to afford. (2) assistive person

## 2021-05-18 NOTE — LETTER
5/18/2021 Patient: Darrian Montilla YOB: 1955 Date of Visit: 5/18/2021 Cricket Lindo MD 
[address] [id number] [address] [employer] Dear Cricket Lindo MD, Thank you for referring Mr. Lashawn Bahena to CARDIO SPECIALIST AT [address] [place] [address] for evaluation. My notes for this consultation are attached. If you have questions, please do not hesitate to call me. I look forward to following your patient along with you. Sincerely, Rochelle Gibbs MD

## 2021-05-18 NOTE — PATIENT INSTRUCTIONS
Testing   Echo    **call office 3-5 days after testing is completed for results**     New Medication/Medication Changes  Eliquis 5 mg twice daily     **please allow 24-48 hrs for medication to be escribed to pharmacy** If you need any refills on medications please contact your pharmacy so that the request can be escribed to the provider for review.

## 2021-05-18 NOTE — PROGRESS NOTES
James Ordonez presents today for No chief complaint on file. James Ordonez preferred language for health care discussion is english/other. Personal Protective Equipment:   Personal Protective Equipment was used including: mask-surgical and hands-gloves. Patient was placed on no precaution(s). Patient was masked. Precautions:   Patient currently on None  Patient currently roomed with door closed    Is someone accompanying this pt? no  Is the patient using any DME equipment during OV? no    Depression Screening:  3 most recent PHQ Screens 5/18/2021   Little interest or pleasure in doing things Not at all   Feeling down, depressed, irritable, or hopeless Not at all   Total Score PHQ 2 0       Learning Assessment:  Learning Assessment 3/16/2017   PRIMARY LEARNER Patient   HIGHEST LEVEL OF EDUCATION - PRIMARY LEARNER  DID NOT GRADUATE [facility] PRIMARY LEARNER NONE   CO-LEARNER CAREGIVER No   PRIMARY LANGUAGE ENGLISH   LEARNER PREFERENCE PRIMARY DEMONSTRATION   ANSWERED BY patient   RELATIONSHIP SELF       Abuse Screening:  Abuse Screening Questionnaire 9/15/2020   Do you ever feel afraid of your partner? N   Are you in a relationship with someone who physically or mentally threatens you? N   Is it safe for you to go home? Y       Fall Risk  Fall Risk Assessment, last 12 mths 5/18/2021   Able to walk? Yes   Fall in past 12 months? 0   Do you feel unsteady? 0   Are you worried about falling 0       Pt currently taking Anticoagulant therapy? no    Coordination of Care:  1. Have you been to the ER, urgent care clinic since your last visit? Hospitalized since your last visit? no    2. Have you seen or consulted any other health care providers outside of the [address] [place] [address] since your last visit? Include any pap smears or colon screening.  no

## 2021-05-20 ENCOUNTER — PATIENT OUTREACH (OUTPATIENT)
Dept: CASE MANAGEMENT | Age: 66
End: 2021-05-20

## 2021-05-26 ENCOUNTER — PATIENT OUTREACH (OUTPATIENT)
Dept: CASE MANAGEMENT | Age: 66
End: 2021-05-26

## 2021-05-26 NOTE — PROGRESS NOTES
Complex Case Management      Date/Time:  2021 12:02 PM    Method of communication with patient:phone    [facility] ([facility]) contacted the patient by telephone to perform Ambulatory Care Coordination. Verified name and  (PHI) with patient as identifiers. Provided introduction to self, and explanation of the Ambulatory Care Manager's role. Top Challenges reviewed with the patient   1. FSBS after breakfast - 101       The patient agrees to contact the PCP office or the [facility] [employer] for questions related to their healthcare. Provided contact information for future reference. Disease Specific:   CHF - B/P 112/76; weight 225 lbs; denies edema, shortness of breath or chest pain. Medication(s):   Medication reconciliation was performed with patient, who verbalizes understanding of administration of home medications. There were no barriers to obtaining medications identified at this time. Referral to Pharm D needed: no     Current Outpatient Medications   Medication Sig    apixaban (ELIQUIS) 5 mg tablet Take 1 Tablet by mouth two (2) times a day.  sacubitriL-valsartan (Entresto) 49-51 mg tab tablet Take 1 Tab by mouth two (2) times a day.  cholecalciferol (VITAMIN D3) (1000 Units /25 mcg) tablet Take 1,000 Units by mouth two (2) times a day.  fluticasone propionate (Flonase Allergy Relief) 50 mcg/actuation nasal spray 2 Sprays by Both Nostrils route daily.  nitroglycerin (NITROSTAT) 0.4 mg SL tablet 0.4 mg by SubLINGual route every five (5) minutes as needed for Chest Pain. Up to 3 doses.  cetaphil (CETAPHIL) topical cream Apply  to affected area as needed (dry skin).  cetirizine (ZYRTEC) 10 mg tablet Take 1 Tab by mouth daily as needed for Allergies.  hydrALAZINE (APRESOLINE) 25 mg tablet Take 1 Tab by mouth three (3) times daily.  pantoprazole (PROTONIX) 40 mg tablet Take 1 Tab by mouth daily.     hydroCHLOROthiazide (HYDRODIURIL) 25 mg tablet Take 1 Tab by mouth daily.  atorvastatin (LIPITOR) 80 mg tablet Take 1 Tab by mouth nightly.  carvedilol (COREG) 25 mg tablet Take 0.5 Tabs by mouth two (2) times daily (with meals). 1/2 tab BID    NIFEdipine ER (ADALAT CC) 60 mg ER tablet Take 1 Tab by mouth daily.  albuterol (PROVENTIL HFA, VENTOLIN HFA, PROAIR HFA) 90 mcg/actuation inhaler Take 2 Puffs by inhalation every six (6) hours as needed for Wheezing.  aspirin 81 mg chewable tablet Take 1 Tab by mouth daily. Indications: MYOCARDIAL INFARCTION PREVENTION    insulin glargine (LANTUS) 100 unit/mL injection 28 Units by SubCUTAneous route nightly.  sildenafil citrate (VIAGRA) 50 mg tablet Take 1 Tab by mouth as needed for Erectile Dysfunction. (Patient not taking: Reported on 5/26/2021)    acetaminophen (TYLENOL EXTRA STRENGTH) 500 mg tablet Take 2 Tabs by mouth every six (6) hours as needed for Pain. (Patient not taking: Reported on 5/26/2021)     No current facility-administered medications for this visit.        [facility] follow up appointment(s):   Future Appointments   Date Time Provider Deondre Marisela   6/16/2021  1:30 PM [facility] [address] ECHO 1 [facility] BS AMB   8/18/2021  3:50 PM CSI, PACER [facility] BS AMB   1/18/2022 11:00 AM Zonia Brink MD [facility] BS AMB        Non-[facility] follow up appointment(s): states he needs to [facility] to\" PCP about having his eye exam.     Goals Addressed                    This Visit's Progress      I want to lose weight (pt-stated)   On track      Patient has goal to lose 40 lbs by December 2021    Patient will begin exercise program - will walk in the mall daily  Will eat heart healthy diet- no added salt, increase fruits and vegetables  Will eat ADA diet - avoid concentrated sweets    5/26/21  Weight 225 lbs  States he needs to increase vegetable intake  Has not yet started daily walks

## 2021-06-09 NOTE — ED PROVIDER NOTES
[address] EMERGENCY DEPT    Date: 1/4/2021  Patient Name: Emmanuel Clay    History of Presenting Illness     Chief Complaint   Patient presents with    Hypertension     76 y.o. male with a past medical history of hypertension presents to the ED complaining of elevated blood pressure prior to arrival.  Patient states that when he took his blood pressure there were \"3 digits on the lower number. \"  He normally takes his nightly meds around 6 PM, which includes atenolol. He took his hydralazine 30 minutes prior to arrival here. He denies any headache, chest pain, any other symptoms whatsoever. Patient denies any other associated signs or symptoms. Patient denies any other complaints. Nursing notes regarding the HPI and triage nursing notes were reviewed. Prior medical records were reviewed. Current Outpatient Medications   Medication Sig Dispense Refill    cholecalciferol (VITAMIN D3) (1000 Units /25 mcg) tablet Take 1,000 Units by mouth two (2) times a day.  fluticasone propionate (Flonase Allergy Relief) 50 mcg/actuation nasal spray 2 Sprays by Both Nostrils route daily.  nitroglycerin (NITROSTAT) 0.4 mg SL tablet 0.4 mg by SubLINGual route every five (5) minutes as needed for Chest Pain. Up to 3 doses.  cetaphil (CETAPHIL) topical cream Apply  to affected area as needed (dry skin). 453 g 1    cetirizine (ZYRTEC) 10 mg tablet Take 1 Tab by mouth daily as needed for Allergies. 30 Tab 2    hydrALAZINE (APRESOLINE) 25 mg tablet Take 1 Tab by mouth three (3) times daily. 90 Tab 3    pantoprazole (PROTONIX) 40 mg tablet Take 1 Tab by mouth daily. 30 Tab 1    losartan (COZAAR) 100 mg tablet Take 1 Tab by mouth daily. 90 Tab 3    hydroCHLOROthiazide (HYDRODIURIL) 25 mg tablet Take 1 Tab by mouth daily. 90 Tab 3    atorvastatin (LIPITOR) 80 mg tablet Take 1 Tab by mouth nightly.  30 Tab 5    carvedilol (COREG) 25 mg tablet Take 0.5 Tabs by mouth two (2) times daily (with RN cannot approve Refill Request    RN can NOT refill this medication Protocol failed and NO refill given.       Jackie Gonzalez, [facility] Triage/Med Refill 6/23/2020    Requested Prescriptions   Pending Prescriptions Disp Refills     glipiZIDE (GLUCOTROL XL) 10 MG 24 hr tablet [Pharmacy Med Name: GLIPIZIDE ER 10 MG TB24 10 TAB] 90 tablet 3     Sig: TAKE 1 PILL BY MOUTH DAILY FOR DIABETES       Oral Hypoglycemics Refill Protocol Failed - 6/22/2020  9:11 AM        Failed - Visit with PCP or prescribing provider visit in last 6 months       Last office visit with prescriber/PCP: Visit date not found OR same dept: 12/12/2019 Mei Carbone MD OR same specialty: 12/12/2019 Mei Carbone MD Last physical: Visit date not found Last MTM visit: Visit date not found         Next appt within 3 mo: Visit date not found  Next physical within 3 mo: Visit date not found  Prescriber OR PCP: Mei Carbone MD  Last diagnosis associated with med order: 1. Type 2 diabetes mellitus with diabetic polyneuropathy, without long-term current use of insulin  - glipiZIDE (GLUCOTROL XL) 10 MG 24 hr tablet [Pharmacy Med Name: GLIPIZIDE ER 10 MG TB24 10 TAB]; TAKE 1 PILL BY MOUTH DAILY FOR DIABETES  Dispense: 90 tablet; Refill: 3    2. Controlled type 2 diabetes mellitus without complication, without long-term current use of insulin (H)  - glipiZIDE (GLUCOTROL XL) 10 MG 24 hr tablet [Pharmacy Med Name: GLIPIZIDE ER 10 MG TB24 10 TAB]; TAKE 1 PILL BY MOUTH DAILY FOR DIABETES  Dispense: 90 tablet; Refill: 3     If protocol passes may refill for 12 months if within 3 months of last provider visit (or a total of 15 months).           Failed - A1C in last 6 months     Hemoglobin A1c   Date Value Ref Range Status   12/12/2019 8.1 (H) 3.5 - 6.0 % Final               Passed - Microalbumin in last year      Microalbumin, Random Urine   Date Value Ref Range Status   07/16/2019 <0.50 0.00 - 1.99 mg/dL Final                  Passed -  Blood pressure in last year     BP Readings from Last 1 Encounters:   12/12/19 94/62             Passed - Serum creatinine in last year     Creatinine   Date Value Ref Range Status   01/09/2020 0.73 0.60 - 1.10 mg/dL Final                    meals). 1/2 tab BID 30 Tab 3    NIFEdipine ER (ADALAT CC) 60 mg ER tablet Take 1 Tab by mouth daily. 90 Tab 3    albuterol (PROVENTIL HFA, VENTOLIN HFA, PROAIR HFA) 90 mcg/actuation inhaler Take 2 Puffs by inhalation every six (6) hours as needed for Wheezing. 1 Inhaler 3    acetaminophen (TYLENOL EXTRA STRENGTH) 500 mg tablet Take 2 Tabs by mouth every six (6) hours as needed for Pain. 50 Tab 0    aspirin 81 mg chewable tablet Take 1 Tab by mouth daily. Indications: MYOCARDIAL INFARCTION PREVENTION 100 Tab 5    insulin glargine (LANTUS) 100 unit/mL injection 28 Units by SubCUTAneous route nightly. pt takes 30 Units          Past History     Past Medical History:  Past Medical History:   Diagnosis Date    Biventricular ICD (implantable cardioverter-defibrillator) in place     [employer]    CAD (coronary artery disease)     Cardiomyopathy, ischemic     EF 30% () 30-35% (11/15)    [facility]     DVT (deep venous thrombosis) ([facility]     L axillary vein after PPM insertion    HLD (hyperlipidemia)     Hypertension     NSTEMI (non-ST elevated myocardial infarction) ([facility]     S/P OM1 2.5 X 23 mm XIENCE (11/15)    Pulmonary emphysema ([facility]     Stroke [facility])        Past Surgical History:  Past Surgical History:   Procedure Laterality Date    COLONOSCOPY N/A 2017    COLONOSCOPY performed by Carrington Helms MD at [address] [place] [address] Drive ENDOSCOPY    HX PACEMAKER PLACEMENT      VASCULAR SURGERY PROCEDURE UNLIST      Stent placed right thigh       Family History:  Family History   Problem Relation Age of Onset    Heart Disease Mother     Heart Disease Father        Social History:  Social History     Tobacco Use    Smoking status: Former Smoker     Packs/day: 1.50     Years: 30.00     Pack years: 45.00     Types: Cigarettes     Quit date: 2015     Years since quittin.1    Smokeless tobacco: Never Used   Substance Use Topics    Alcohol use: No     Alcohol/week: 0.0 standard drinks    Drug use:  No Allergies: Allergies   Allergen Reactions    Lasix [Furosemide] Other (comments)    Levofloxacin Rash    Penicillins Swelling       Patient's primary care provider (as noted in EPIC):  Coleta Lennox, MD    Review of Systems   Constitutional:  Denies malaise, fever, chills. Cardiac:  Denies chest pain or palpitations. Respiratory:  Denies shortness of breath. GI/ABD:  Denies injury, pain, distention, nausea, vomiting, diarrhea. Neuro:  Denies headache, LOC, dizziness, neurologic symptoms/deficits/paresthesias. Skin: Denies injury, rash, itching or skin changes. All other systems negative as reviewed. Visit Vitals  BP (!) 144/104   Pulse 100   Temp 98.4 °F (36.9 °C)   Resp 18   Ht 5' 9\" (1.753 m)   Wt 103.4 kg (228 lb)   SpO2 99%   BMI 33.67 kg/m²       PHYSICAL EXAM:    CONSTITUTIONAL:  Alert, in no apparent distress;  well developed;  well nourished. HEAD:  Normocephalic, atraumatic. EYES:  EOMI. Non-icteric sclera. Normal conjunctiva. NECK:  Supple  RESPIRATORY:  Chest clear, equal breath sounds, good air movement. CARDIOVASCULAR:  Regular rate and rhythm. No murmurs, rubs, or gallops. GI:  Normal bowel sounds, abdomen soft and non-tender. No rebound or guarding. NEURO:  Moves all four extremities, and grossly normal motor exam.  SKIN:  No rashes;  Normal for age. PSYCH:  Alert and normal affect. MEDICAL DECISION MAKING:    IMPRESSION AND MEDICAL DECISION MAKING:  Based upon the patient's presentation with noted HPI and PE, along with the work up done in the emergency department, I believe that the patient is having elevated blood pressure in a patient with known hypertension. Patient reports taking his blood pressure at home, he does not know the exact reading, but says he thought the diastolic was 895. He took his hydralazine as he was supposed to which was 30 minutes prior to coming here.   The time was approximately 5:30pm and he stated that he normally takes his nightly atenolol at 6 or 7. Patient is completely asymptomatic, he was directed to take his atenolol dosage, and we will reevaluate him. Patient continues to be asymptomatic, repeat blood pressure was 144/104 after waiting for 30 minutes following taking his atenolol. He may follow-up with his primary care doctor, return for any acute worsening. There are no signs or symptoms to indicate end organ damage. There is no hypertensive emergency at time of the emergency department visit. Diagnosis:   1. Elevated blood pressure reading      Disposition: Discharge    Follow-up Information     Follow up With Specialties Details Why Contact Info    Concha Coles MD Family Medicine, Internal Medicine In 3 days  [id number] [id number]  [phone]      [facility] EMERGENCY DEPT Emergency Medicine  If symptoms worsen [address]  [phone]          Discharge Medication List as of 1/4/2021  6:02 PM      CONTINUE these medications which have NOT CHANGED    Details   cholecalciferol (VITAMIN D3) (1000 Units /25 mcg) tablet Take 1,000 Units by mouth two (2) times a day., Historical Med      fluticasone propionate (Flonase Allergy Relief) 50 mcg/actuation nasal spray 2 Sprays by Both Nostrils route daily. , Historical Med      nitroglycerin (NITROSTAT) 0.4 mg SL tablet 0.4 mg by SubLINGual route every five (5) minutes as needed for Chest Pain. Up to 3 doses. , Historical Med      cetaphil (CETAPHIL) topical cream Apply  to affected area as needed (dry skin). , Normal, Disp-453 g, R-1      cetirizine (ZYRTEC) 10 mg tablet Take 1 Tab by mouth daily as needed for Allergies. , Normal, Disp-30 Tab, R-2      hydrALAZINE (APRESOLINE) 25 mg tablet Take 1 Tab by mouth three (3) times daily. , Normal, Disp-90 Tab, R-3      pantoprazole (PROTONIX) 40 mg tablet Take 1 Tab by mouth daily. , Normal, Disp-30 Tab, R-1      losartan (COZAAR) 100 mg tablet Take 1 Tab by mouth daily. , Normal, Disp-90 Tab, R-3      hydroCHLOROthiazide (HYDRODIURIL) 25 mg tablet Take 1 Tab by mouth daily. , Normal, Disp-90 Tab, R-3      atorvastatin (LIPITOR) 80 mg tablet Take 1 Tab by mouth nightly., Normal, Disp-30 Tab, R-5      carvedilol (COREG) 25 mg tablet Take 0.5 Tabs by mouth two (2) times daily (with meals). 1/2 tab BID, Normal, Disp-30 Tab, R-3      NIFEdipine ER (ADALAT CC) 60 mg ER tablet Take 1 Tab by mouth daily. , Normal, Disp-90 Tab, R-3      albuterol (PROVENTIL HFA, VENTOLIN HFA, PROAIR HFA) 90 mcg/actuation inhaler Take 2 Puffs by inhalation every six (6) hours as needed for Wheezing., Normal, Disp-1 Inhaler, R-3      acetaminophen (TYLENOL EXTRA STRENGTH) 500 mg tablet Take 2 Tabs by mouth every six (6) hours as needed for Pain., Print, Disp-50 Tab, R-0      aspirin 81 mg chewable tablet Take 1 Tab by mouth daily. Indications: MYOCARDIAL INFARCTION PREVENTION, Print, Disp-100 Tab, R-5      insulin glargine (LANTUS) 100 unit/mL injection 28 Units by SubCUTAneous route nightly.  pt takes 30 Units , Historical [facility]           Wanda Melo, [address]

## 2021-06-19 LAB — HBA1C MFR BLD HPLC: 6.7 %

## 2021-06-30 ENCOUNTER — PATIENT OUTREACH (OUTPATIENT)
Dept: CASE MANAGEMENT | Age: 66
End: 2021-06-30

## 2021-06-30 NOTE — PROGRESS NOTES
ACM contacted patient via phone. Verified name and  as identifiers. Patient reports that he is in [facility] he has been there over 2 weeks. Per [facility] EMR patient was admitted 21 for cardiogenic shock.

## 2021-07-26 ENCOUNTER — TELEPHONE (OUTPATIENT)
Dept: FAMILY MEDICINE CLINIC | Age: 66
End: 2021-07-26

## 2021-07-26 NOTE — TELEPHONE ENCOUNTER
Pt was discharged from the hospital yesterday and his feet have gotten worse. His gain green has spread to other toes. And there is a blister on each toe on the right foot. She would like to speak with a nurse Please advise.  Thank you!!!

## 2021-07-29 NOTE — TELEPHONE ENCOUNTER
Called patients daughter, patient was admitted a second time on 7/26/21. He is being treated for peripheral arterial disease, and gangrene of his feet.

## 2021-08-09 NOTE — PROGRESS NOTES
I have personally seen and evaluated the device findings. Interrogation reviewed and I agree with assessment.     Tremaine Munoz

## 2021-08-16 ENCOUNTER — PATIENT OUTREACH (OUTPATIENT)
Dept: CASE MANAGEMENT | Age: 66
End: 2021-08-16

## 2021-08-16 NOTE — PROGRESS NOTES
Complex Case Management      Date/Time:  2021 10:35 AM    Method of communication with patient:phone    [facility] ([facility]) contacted the patient by telephone to perform Ambulatory Care Coordination. Verified name and  (PHI) with patient as identifiers. Provided introduction to self, and explanation of the Ambulatory Care Manager's role. Patient reports that he is at [facility] now\" because he stuck a nail in his foot. Requests call back another day.

## 2021-08-25 ENCOUNTER — OFFICE VISIT (OUTPATIENT)
Dept: FAMILY MEDICINE CLINIC | Age: 66
End: 2021-08-25
Payer: MEDICARE

## 2021-08-25 VITALS
SYSTOLIC BLOOD PRESSURE: 131 MMHG | DIASTOLIC BLOOD PRESSURE: 83 MMHG | TEMPERATURE: 97.7 F | HEIGHT: 71 IN | OXYGEN SATURATION: 98 % | WEIGHT: 208 LBS | BODY MASS INDEX: 29.12 KG/M2 | HEART RATE: 78 BPM | RESPIRATION RATE: 16 BRPM

## 2021-08-25 DIAGNOSIS — J43.9 PULMONARY EMPHYSEMA, UNSPECIFIED EMPHYSEMA TYPE (HCC): Primary | ICD-10-CM

## 2021-08-25 DIAGNOSIS — I10 ESSENTIAL HYPERTENSION: ICD-10-CM

## 2021-08-25 DIAGNOSIS — E11.21 TYPE 2 DIABETES WITH NEPHROPATHY (HCC): ICD-10-CM

## 2021-08-25 DIAGNOSIS — E08.42 DIABETIC POLYNEUROPATHY ASSOCIATED WITH DIABETES MELLITUS DUE TO UNDERLYING CONDITION (HCC): ICD-10-CM

## 2021-08-25 DIAGNOSIS — J30.1 SEASONAL ALLERGIC RHINITIS DUE TO POLLEN: ICD-10-CM

## 2021-08-25 DIAGNOSIS — I73.9 PAD (PERIPHERAL ARTERY DISEASE) (HCC): ICD-10-CM

## 2021-08-25 DIAGNOSIS — E11.65 TYPE 2 DIABETES MELLITUS WITH HYPERGLYCEMIA, WITH LONG-TERM CURRENT USE OF INSULIN (HCC): ICD-10-CM

## 2021-08-25 DIAGNOSIS — Z79.4 TYPE 2 DIABETES MELLITUS WITH HYPERGLYCEMIA, WITH LONG-TERM CURRENT USE OF INSULIN (HCC): ICD-10-CM

## 2021-08-25 DIAGNOSIS — S98.111A AMPUTATION OF RIGHT GREAT TOE (HCC): ICD-10-CM

## 2021-08-25 PROCEDURE — G8427 DOCREV CUR MEDS BY ELIG CLIN: HCPCS | Performed by: FAMILY MEDICINE

## 2021-08-25 PROCEDURE — G8510 SCR DEP NEG, NO PLAN REQD: HCPCS | Performed by: FAMILY MEDICINE

## 2021-08-25 PROCEDURE — G8536 NO DOC ELDER MAL SCRN: HCPCS | Performed by: FAMILY MEDICINE

## 2021-08-25 PROCEDURE — 2022F DILAT RTA XM EVC RTNOPTHY: CPT | Performed by: FAMILY MEDICINE

## 2021-08-25 PROCEDURE — 3017F COLORECTAL CA SCREEN DOC REV: CPT | Performed by: FAMILY MEDICINE

## 2021-08-25 PROCEDURE — G8752 SYS BP LESS 140: HCPCS | Performed by: FAMILY MEDICINE

## 2021-08-25 PROCEDURE — 3044F HG A1C LEVEL LT 7.0%: CPT | Performed by: FAMILY MEDICINE

## 2021-08-25 PROCEDURE — G8754 DIAS BP LESS 90: HCPCS | Performed by: FAMILY MEDICINE

## 2021-08-25 PROCEDURE — 99215 OFFICE O/P EST HI 40 MIN: CPT | Performed by: FAMILY MEDICINE

## 2021-08-25 PROCEDURE — 1101F PT FALLS ASSESS-DOCD LE1/YR: CPT | Performed by: FAMILY MEDICINE

## 2021-08-25 PROCEDURE — G8417 CALC BMI ABV UP PARAM F/U: HCPCS | Performed by: FAMILY MEDICINE

## 2021-08-25 RX ORDER — AMIODARONE HYDROCHLORIDE 200 MG/1
200 TABLET ORAL DAILY
COMMUNITY
Start: 2021-07-03

## 2021-08-25 RX ORDER — SPIRONOLACTONE 25 MG/1
12.5 TABLET ORAL DAILY
COMMUNITY
Start: 2021-08-05

## 2021-08-25 RX ORDER — METOPROLOL SUCCINATE 25 MG/1
25 TABLET, EXTENDED RELEASE ORAL DAILY
COMMUNITY
Start: 2021-07-03

## 2021-08-25 RX ORDER — CLOPIDOGREL BISULFATE 75 MG/1
75 TABLET ORAL DAILY
Qty: 90 TABLET | Refills: 3 | Status: SHIPPED | OUTPATIENT
Start: 2021-08-25 | End: 2022-06-14 | Stop reason: ALTCHOICE

## 2021-08-25 RX ORDER — AMOXICILLIN 250 MG
1 CAPSULE ORAL 2 TIMES DAILY
COMMUNITY
End: 2022-06-03

## 2021-08-25 RX ORDER — GABAPENTIN 300 MG/1
600 CAPSULE ORAL 3 TIMES DAILY
Qty: 180 CAPSULE | Refills: 5 | Status: SHIPPED | OUTPATIENT
Start: 2021-08-25

## 2021-08-25 RX ORDER — GABAPENTIN 300 MG/1
300 CAPSULE ORAL 3 TIMES DAILY
COMMUNITY
Start: 2021-08-04 | End: 2021-08-25

## 2021-08-25 RX ORDER — IPRATROPIUM BROMIDE AND ALBUTEROL SULFATE 2.5; .5 MG/3ML; MG/3ML
3 SOLUTION RESPIRATORY (INHALATION)
COMMUNITY
Start: 2021-07-02

## 2021-08-25 RX ORDER — OXYCODONE HYDROCHLORIDE 5 MG/1
5 TABLET ORAL
COMMUNITY
Start: 2021-08-04 | End: 2022-06-03

## 2021-08-25 RX ORDER — CLOPIDOGREL BISULFATE 75 MG/1
75 TABLET ORAL DAILY
COMMUNITY
Start: 2021-08-05 | End: 2021-08-25 | Stop reason: SDUPTHER

## 2021-08-25 RX ORDER — FAMOTIDINE 20 MG/1
20 TABLET, FILM COATED ORAL 2 TIMES DAILY
COMMUNITY
Start: 2021-07-02 | End: 2022-06-03

## 2021-08-25 RX ORDER — FUROSEMIDE 20 MG/1
20 TABLET ORAL 2 TIMES DAILY
COMMUNITY

## 2021-08-25 RX ORDER — MELATONIN
1000 2 TIMES DAILY
Qty: 180 TABLET | Refills: 3 | Status: SHIPPED | OUTPATIENT
Start: 2021-08-25

## 2021-08-25 RX ORDER — CETIRIZINE HCL 10 MG
10 TABLET ORAL
Qty: 90 TABLET | Refills: 3 | Status: SHIPPED | OUTPATIENT
Start: 2021-08-25

## 2021-08-25 RX ORDER — INSULIN GLARGINE 100 [IU]/ML
INJECTION, SOLUTION SUBCUTANEOUS
Qty: 3 PEN | Refills: 3 | Status: SHIPPED | OUTPATIENT
Start: 2021-08-25

## 2021-08-25 NOTE — PROGRESS NOTES
Roxanne Flowers is a 78 y.o.  male and presents with    Chief Complaint   Patient presents with   [facility] Follow Up     7/26/2021 admitted to [facility] with sepsis due to right foot infection  8/1/2021 surgery to amputate right toes; Percutaneous transluminal angioplasty of right medial plantar artery, right posterior tibial artery, and right superficial femoral artery    8/4/2021 discharged to home      Subjective:  PMH of HTN, CHF (EF 30% on echo 6/26), CAD, PAD, and DMII who presents with worsening right lower leg and foot pain. Pt had a recent admission from 6/14 to 7/2 for acute limb ischemia where a CTA demonstrated occlusion of the left iliac artery and stenosis of the right iliac artery. At this time he underwent bilateral iliac artery Ekos catheter placemments, suction thrombectomy of L common iliac artery & L peroneal artery, and angioplasty of L peroneal artery. The patient received four fasciotomies and his course was complicated by cardiogenic shock and ICU admission for pressor support. On 7/26 he presented to the ED for discoloration of the digits of the foot, blistering of the toes and intermittent sharp pain in the right lower leg and foot, that progressed over about a week. In the ED he received an echo showing an EF of 26% with akinesis/hypokinesis of multiple segments. While here he was treated for his heart failure with his home medications: metoprolol, entresto, spironolactone. He had had his previous DAPT held while in the hosptial. It was restarted on 8/2 and on 8/3 ASA was discontinued. He received angioplasty of his right posterior tibial artery on 7/28 and received vanc and merrem to treat gangrene (PVLs were performed demonstrating bilateral arterial insufficiency improved after angioplasty. On 7/29 podiatric surgery was consulted and after discussion with the patient and presentation of options the patient elected to receive transmetatarsal amputation. There were questions about his capacity to make medical decisions and on 7/30 an evaluation demonstrated that he could understand indication for amputation and the risks and consequences of such a procedure. On 7/31 the patient attempted to leave AMA and was convinced to stay as operation was being planned and best option for pain control was in the hospital. On 8/1 the patient went for transmetatarsal amputation with continued Vacn and Merrem after the surgery. After his amputation his Apixaban was restarted for A fib anticoagulation. Patient was given a post-op shoe. He was then instructed to followup with the podiatry clinic outpatient in 1 week. He received vanco and meropenom 7/26-8/3 then was transitioned to doxycycline and keflex. His antibiotics were switched to PO cefuroxime and Doxycycline. Cefuroxime chosen due to BID dosing instead of QID dosing of keflex for compliance. He will need 10 days of antibiotics from operation 8/1. End date 8/11/21  During his stay wound care saw the patient to clean and re-dress his fasciotomy wounds as well as his gangrene. He has continued wound care at home and follow-up with vascular surgery. He has left artery stent; He is taking eliquis and plavix which was started by vascular surgery  Cardiovascular Review:  The patient has diabetes, hypertension, hyperlipidemia, coronary artery disease, Atrial Fibrillation and history of prior stroke. Diet and Lifestyle: generally follows a low fat low cholesterol diet, generally follows a low sodium diet, does not rigorously follow a diabetic diet, sedentary  Home BP Monitoring: is not measured at home. Pertinent ROS: taking medications as instructed, no medication side effects noted, no TIA's, no chest pain on exertion, no dyspnea on exertion, no swelling of ankles.    ROS   General ROS: negative for - chills or fever  Psychological ROS: negative for - anxiety  Ophthalmic ROS: negative for - decreased vision    All other systems reviewed and are negative. Objective:  Vitals:    08/25/21 1625   BP: 131/83   Pulse: 78   Resp: 16   Temp: 97.7 °F (36.5 °C)   TempSrc: Temporal   SpO2: 98%   Weight: 208 lb (94.3 kg)   Height: 5' 11\" (1.803 m)   PainSc:   8   PainLoc: Generalized       alert, well appearing, and in no distress, oriented to person, place, and time and overweight  Mental status - anxious  Chest - normal work of breathing  Heart - irregularly irregular rhythm  Neurological - CNII-xII    LABS     TESTS  Echocardiogram EF 23%  Assessment/Plan:    1. Seasonal allergic rhinitis due to pollen  Antihistamine ordered  - cetirizine (ZYRTEC) 10 mg tablet; Take 1 Tablet by mouth daily as needed for Allergies. Dispense: 90 Tablet; Refill: 3    2. Pulmonary emphysema, unspecified emphysema type ([facility]  Continue inhaled therapy              3. Type 2 diabetes with nephropathy (HCC)  Goal hgb a1c  - insulin glargine (LANTUS,BASAGLAR) 100 unit/mL (3 mL) inpn; Give 15 units subcutaneously daily  Dispense: 3 Pen; Refill: 3  - cholecalciferol (VITAMIN D3) (1000 Units /25 mcg) tablet; Take 1 Tablet by mouth two (2) times a day. Dispense: 180 Tablet; Refill: 3    4. Amputation of right great toe ([facility]  Continue wound care; f/u with podiatry;  5. Essential hypertension  Goal <130/80    6. Type 2 diabetes mellitus with hyperglycemia, with long-term current use of insulin ([facility])  Goal hgb 1  - insulin glargine (LANTUS,BASAGLAR) 100 unit/mL (3 mL) inpn; Give 15 units subcutaneously daily  Dispense: 3 Pen; Refill: 3  - HEMOGLOBIN A1C WITH EAG; Future    7. Diabetic polyneuropathy associated with diabetes mellitus due to underlying condition ([facility]  Continue treatment  - gabapentin (NEURONTIN) 300 mg capsule; Take 2 Capsules by mouth three (3) times daily. Max Daily Amount: 1,800 mg. Dispense: 180 Capsule; Refill: 5    8. PAD (peripheral artery disease) ([facility]  f/u with vasular surgery  - clopidogreL (PLAVIX) 75 mg tab; Take 1 Tablet by mouth daily. Dispense: 90 Tablet; Refill: 3      Lab review: labs reviewed, I note that glycosylated hemoglobin mildly abnormal but acceptable      I have discussed the diagnosis with the patient and the intended plan as seen in the above orders. The patient has received an after-visit summary and questions were answered concerning future plans. I have discussed medication side effects and warnings with the patient as well. I have reviewed the plan of care with the patient, accepted their input and they are in agreement with the treatment goals.

## 2021-08-25 NOTE — PROGRESS NOTES
Ramona Parrish presents today for   Chief Complaint   Patient presents with   [facility] Follow Up       Is someone accompanying this pt? Yes his niece    Is the patient using any DME equipment during [address]? Yes a wheelchair    Depression Screening:  3 most recent PHQ Screens 8/25/2021   Little interest or pleasure in doing things Not at all   Feeling down, depressed, irritable, or hopeless Not at all   Total Score PHQ 2 0       Learning Assessment:  Learning Assessment 3/16/2017   PRIMARY LEARNER Patient   HIGHEST LEVEL OF EDUCATION - PRIMARY LEARNER  DID NOT GRADUATE HIGH SCHOOL   BARRIERS PRIMARY LEARNER NONE   CO-LEARNER CAREGIVER No   PRIMARY LANGUAGE ENGLISH   LEARNER PREFERENCE PRIMARY DEMONSTRATION   ANSWERED BY patient   RELATIONSHIP SELF       Abuse Screening:  Abuse Screening Questionnaire 9/15/2020   Do you ever feel afraid of your partner? N   Are you in a relationship with someone who physically or mentally threatens you? N   Is it safe for you to go home? Y       Fall Risk  Fall Risk Assessment, last 12 mths 8/25/2021   Able to walk? No   Fall in past 12 months? -   Do you feel unsteady? -   Are you worried about falling -       Health Maintenance reviewed and discussed and ordered per Provider. Health Maintenance Due   Topic Date Due    Eye Exam Retinal or Dilated  Never done    Shingrix Vaccine Age 50> (1 of 2) Never done    Low dose CT lung screening  Never done    Pneumococcal 65+ years (2 of 2 - PPSV23) 10/04/2020   . Coordination of Care:  1. Have you been to the ER, urgent care clinic since your last visit? Hospitalized since your last visit? Yes, 7/26/21, SNG, DVT    2. Have you seen or consulted any other health care providers outside of the [address] [place] [address] since your last visit? Include any pap smears or colon screening.  no      Last  Checked na  Last UDS Checked na  Last Pain contract signed: [facility] [place] follow up  Med eval

## 2021-08-30 ENCOUNTER — PATIENT OUTREACH (OUTPATIENT)
Dept: CASE MANAGEMENT | Age: 66
End: 2021-08-30

## 2021-08-30 NOTE — PROGRESS NOTES
Patient requested ACM call back at later time. Complex Case Management      Date/Time:  8/30/2021 1:20 PM     Attempted to reach patient by telephone. Left HIPPA compliant message requesting a return call. Will attempt to reach patient at a later time.

## 2021-09-10 ENCOUNTER — OFFICE VISIT (OUTPATIENT)
Dept: CARDIOLOGY CLINIC | Age: 66
End: 2021-09-10
Payer: MEDICARE

## 2021-09-10 VITALS
SYSTOLIC BLOOD PRESSURE: 101 MMHG | OXYGEN SATURATION: 98 % | HEART RATE: 92 BPM | DIASTOLIC BLOOD PRESSURE: 70 MMHG | TEMPERATURE: 97.7 F | RESPIRATION RATE: 16 BRPM

## 2021-09-10 DIAGNOSIS — I25.83 CORONARY ARTERY DISEASE DUE TO LIPID RICH PLAQUE: Primary | ICD-10-CM

## 2021-09-10 DIAGNOSIS — I25.5 ISCHEMIC CARDIOMYOPATHY: ICD-10-CM

## 2021-09-10 DIAGNOSIS — E78.00 PURE HYPERCHOLESTEROLEMIA: ICD-10-CM

## 2021-09-10 DIAGNOSIS — I25.10 CORONARY ARTERY DISEASE DUE TO LIPID RICH PLAQUE: Primary | ICD-10-CM

## 2021-09-10 DIAGNOSIS — I10 ESSENTIAL HYPERTENSION WITH GOAL BLOOD PRESSURE LESS THAN 140/90: ICD-10-CM

## 2021-09-10 PROCEDURE — 3017F COLORECTAL CA SCREEN DOC REV: CPT | Performed by: INTERNAL MEDICINE

## 2021-09-10 PROCEDURE — G8417 CALC BMI ABV UP PARAM F/U: HCPCS | Performed by: INTERNAL MEDICINE

## 2021-09-10 PROCEDURE — G8754 DIAS BP LESS 90: HCPCS | Performed by: INTERNAL MEDICINE

## 2021-09-10 PROCEDURE — 1101F PT FALLS ASSESS-DOCD LE1/YR: CPT | Performed by: INTERNAL MEDICINE

## 2021-09-10 PROCEDURE — 99214 OFFICE O/P EST MOD 30 MIN: CPT | Performed by: INTERNAL MEDICINE

## 2021-09-10 PROCEDURE — G8536 NO DOC ELDER MAL SCRN: HCPCS | Performed by: INTERNAL MEDICINE

## 2021-09-10 PROCEDURE — G8428 CUR MEDS NOT DOCUMENT: HCPCS | Performed by: INTERNAL MEDICINE

## 2021-09-10 PROCEDURE — G8752 SYS BP LESS 140: HCPCS | Performed by: INTERNAL MEDICINE

## 2021-09-10 PROCEDURE — G8432 DEP SCR NOT DOC, RNG: HCPCS | Performed by: INTERNAL MEDICINE

## 2021-09-10 NOTE — PROGRESS NOTES
Raf Vázquez presents today for   Chief Complaint   Patient presents with   [address] preferred language for health care discussion is english/other. Personal Protective Equipment:   Personal Protective Equipment was used including: mask-surgical and hands-gloves. Patient was placed on no precaution(s). Patient was masked. Precautions:   Patient currently on None  Patient currently roomed with door closed    Is someone accompanying this pt? Yes caregiver    Is the patient using any DME equipment during OV? Yes wc    Depression Screening:  3 most recent PHQ Screens 9/10/2021   Little interest or pleasure in doing things Not at all   Feeling down, depressed, irritable, or hopeless Not at all   Total Score PHQ 2 0       Learning Assessment:  Learning Assessment 3/16/2017   PRIMARY LEARNER Patient   HIGHEST LEVEL OF EDUCATION - PRIMARY LEARNER  DID NOT GRADUATE HIGH SCHOOL   BARRIERS PRIMARY LEARNER NONE   CO-LEARNER CAREGIVER No   PRIMARY LANGUAGE ENGLISH   LEARNER PREFERENCE PRIMARY DEMONSTRATION   ANSWERED BY patient   RELATIONSHIP SELF       Abuse Screening:  Abuse Screening Questionnaire 9/15/2020   Do you ever feel afraid of your partner? N   Are you in a relationship with someone who physically or mentally threatens you? N   Is it safe for you to go home? Y       Fall Risk  Fall Risk Assessment, last 12 mths 8/25/2021   Able to walk? No   Fall in past 12 months? -   Do you feel unsteady? -   Are you worried about falling -       Pt currently taking Anticoagulant therapy? yes    Coordination of Care:  1. Have you been to the ER, urgent care clinic since your last visit? Hospitalized since your last visit? yes    2. Have you seen or consulted any other health care providers outside of the [address] [place] [address] since your last visit? Include any pap smears or colon screening.  no

## 2021-09-10 NOTE — LETTER
9/10/2021    Patient: Abner Wilks   YOB: 1955   Date of Visit: 9/10/2021     Shania Joy MD  [id number] [facility] [id number]  [address] [place]    Dear Shania Joy MD,      Thank you for referring Mr. Zena Trimble to CARDIO SPECIALIST AT [facility] for evaluation. My notes for this consultation are attached. If you have questions, please do not hesitate to call me. I look forward to following your patient along with you.       Sincerely,    Balbir Durant MD

## 2021-09-10 NOTE — PROGRESS NOTES
Cardiovascular Specialists    Mr. Ana Laura Rose is a 78 y.o. male with a history of coronary artery disease, status post OM1 stent in November, 2015, diabetes, hypertension, stroke, hyperlipidemia, cardiomyopathy s/p BiV ICD in 07/16    Mr. Ana Laura Rose is here today for follow up appointment. Patient was admitted to hospital in 07/2021 for right leg pain and ulcer. He was found to have a acute limb ischemia. He had to undergo initially percutaneous angioplasty and then eventually had to undergo thrombectomies. He had to undergo multiple fasciotomy. Hospital course was complicated by cardiogenic shock and adjustment of the medications. He also eventually had to undergo toe amputation of lower extremities    Is here today. He is along with the niece. He denies any chest pain or chest tightness. He is in a wheelchair because of recent surgeries of lower extremities  Using 1 pillow at night. Taking all the medication as prescribed  He is unaware of the palpitations.     Denies any bleeding problem    Past Medical History:   Diagnosis Date    Biventricular ICD (implantable cardioverter-defibrillator) in place 07/16    [employer]    CAD (coronary artery disease)     Cardiomyopathy, ischemic     EF 30% (04/16) 30-35% (11/15)    Diabetes ([address]     DVT (deep venous thrombosis) ([address] 08/16    L axillary vein after PPM insertion    HLD (hyperlipidemia)     Hypertension     NSTEMI (non-ST elevated myocardial infarction) ([address]     S/P OM1 2.5 X 23 mm XIENCE (11/15)    PAD [facility]peripheral artery disease) [facility]     S/P LE intervention and thrombectomy    Pulmonary emphysema ([address]     Stroke [facility])          Past Surgical History:   Procedure Laterality Date    COLONOSCOPY N/A 12/14/2017    COLONOSCOPY performed [facility] Nallely Eastman MD at [facility] ENDOSCOPY    HX PACEMAKER PLACEMENT      VASCULAR SURGERY PROCEDURE UNLIST      Stent placed right thigh       Current Outpatient Medications   Medication Sig    amiodarone (CORDARONE) 200 mg tablet Take 200 mg by mouth daily.  famotidine (PEPCID) 20 mg tablet Take 20 mg by mouth two (2) times a day.  furosemide (LASIX) 20 mg tablet Take 20 mg by mouth.  metoprolol succinate (TOPROL-XL) 25 mg XL tablet Take 25 mg by mouth daily.  clopidogreL (PLAVIX) 75 mg tab Take 1 Tablet by mouth daily.  apixaban (ELIQUIS) 5 mg tablet Take 1 Tablet by mouth two (2) times a day.  sacubitriL-valsartan (Entresto) 49-51 mg tab tablet Take 1 Tab by mouth two (2) times a day.  nitroglycerin (NITROSTAT) 0.4 mg SL tablet 0.4 mg by SubLINGual route every five (5) minutes as needed for Chest Pain. Up to 3 doses.  atorvastatin (LIPITOR) 80 mg tablet Take 1 Tab by mouth nightly.  albuterol-ipratropium (DUO-NEB) 2.5 mg-0.5 mg/3 ml nebu Take 3 mL by inhalation every four (4) hours as needed.  oxyCODONE IR (ROXICODONE) 5 mg immediate release tablet Take 5 mg by mouth every six (6) hours as needed.  senna-docusate (PERICOLACE) 8.6-50 mg per tablet Take 1 Tablet by mouth two (2) times a day.  spironolactone (ALDACTONE) 25 mg tablet Take 12.5 mg by mouth daily.  cetirizine (ZYRTEC) 10 mg tablet Take 1 Tablet by mouth daily as needed for Allergies.  gabapentin (NEURONTIN) 300 mg capsule Take 2 Capsules by mouth three (3) times daily. Max Daily Amount: 1,800 mg.    insulin glargine (LANTUS,BASAGLAR) 100 unit/mL (3 mL) inpn Give 15 units subcutaneously daily    cholecalciferol (VITAMIN D3) (1000 Units /25 mcg) tablet Take 1 Tablet by mouth two (2) times a day.  fluticasone propionate (Flonase Allergy Relief) 50 mcg/actuation nasal spray 2 Sprays by Both Nostrils route daily.  cetaphil (CETAPHIL) topical cream Apply  to affected area as needed (dry skin).  albuterol (PROVENTIL HFA, VENTOLIN HFA, PROAIR HFA) 90 mcg/actuation inhaler Take 2 Puffs by inhalation every six (6) hours as needed for Wheezing.      No current facility-administered medications for this visit. Allergies and Sensitivities:  Allergies   Allergen Reactions    Lasix [Furosemide] Other (comments)    Levofloxacin Rash    Penicillins Swelling       Family History:  Family History   Problem Relation Age of Onset    Heart Disease Mother     Heart Disease Father        Social History:  Social History     Tobacco Use    Smoking status: Former Smoker     Packs/day: 1.50     Years: 30.00     Pack years: 45.00     Types: Cigarettes     Quit date: 2015     Years since quittin.8    Smokeless tobacco: Never Used   Substance Use Topics    Alcohol use: No     Alcohol/week: 0.0 standard drinks    Drug use: No     He  reports that he quit smoking about 5 years ago. His smoking use included cigarettes. He has a 45.00 pack-year smoking history. He has never used smokeless tobacco.  He  reports no history of alcohol use. Review of Systems:  Cardiac symptoms as noted above in HPI. All others negative. Physical Exam:  BP Readings from Last 3 Encounters:   09/10/21 101/70   21 131/83   21 (!) 138/90         Pulse Readings from Last 3 Encounters:   09/10/21 92   21 78   21 74          Wt Readings from Last 3 Encounters:   21 94.3 kg (208 lb)   21 104.3 kg (230 lb)   21 108.4 kg (239 lb)       Constitutional: Oriented to person, place, and time. HENT: Head: Normocephalic and atraumatic. Neck: No JVD present. Cardiovascular: Regular rhythm. No murmur, gallop or rubs appreciated  Lung: Breath sounds normal. No respiratory distress. No ronchi or rales appreciated  Abdominal: No tenderness. No rebound and no guarding. Musculoskeletal: There is trace lower extremity edema. No cynosis.   Both lower extremity are in dressing    Review of Data  LABS:   Lab Results   Component Value Date/Time    Sodium 140 2019 01:44 PM    Potassium 3.6 2019 01:44 PM    Chloride 106 2019 01:44 PM    CO2 26 01/07/2019 01:44 PM    Glucose 130 (H) 01/07/2019 01:44 PM    BUN 20 (H) 01/07/2019 01:44 PM    Creatinine 1.24 01/07/2019 01:44 PM     Lipids Latest Ref Rng & Units 4/22/2021 3/9/2020 1/7/2019 4/5/2018 10/26/2017   Chol, Total <200 MG/ 169 175 157 170   HDL 40 - 60 MG/DL 59 71(H) 65(H) 61 57   LDL 0 - 100 MG/DL 60.8 83.2 75 76 89   Trig <150 MG/DL 91 74 175(H) 98 120   Chol/HDL Ratio 0 - 5.0   2.3 2.4 2.7 - -   Some recent data might be hidden     Lab Results   Component Value Date/Time    ALT (SGPT) 40 01/07/2019 01:44 PM     Lab Results   Component Value Date/Time    Hemoglobin A1c 6.4 (H) 04/22/2021 10:07 AM    Hemoglobin A1c (POC) 6.6 03/09/2020 02:03 PM       EKG    ECHO (11/15)  Left ventricle: The ventricle was dilated. Systolic function was moderately reduced. Ejection fraction was estimated in the range of 30 %  to 35 %. There was mild diffuse hypokinesis. There was severe hypokinesis of inferior/inferolateral wall. Wall thickness was mildly increased. Doppler parameters were consistent with abnormal left ventricular relaxation (grade 1 diastolic dysfunction). Right ventricle: Systolic function was normal.  Aortic valve: There was no stenosis. CATHETERIZATION (11/15)  - LVEF estimated to be 30% with diffuse hypokinesis. No significant mitral regurgitation was noted. - LM: calcification, 20% distal   - LAD had diffuse 50% stenosis in its midportion of the vessel with only luminal irregularities in the proximal LAD. There was a  discrete 50% distal vessel stenosis, as well. The diagonal branches had mild disease.   - LCx: Circumflex had an ostial 50-60% stenosis,   - OM1: 100% thrombotic occlusion proximally. There was very faint collateral filling from the RCA. - RCA: Prox  50% diffuse, 40% distal disease. The right PDA had an 80% ostial stenosis and then a 90% mid vessel stenosis. The right  posterolateral branch had a 90% stenosis in a sub branch. 4. PCI: 100% OM-1 stenosis reduced to 0%. drug-eluting stent, Xience 2.5 x 23 mm     IMPRESSION & PLAN:  Mr. Lucas Burrell is a 78 y.o. male with cardiomyopathy, coronary artery disease, hypertension, hyperlipidemia. Coronary artery disease:  Mr. Lucas Burrell had an OM1 stent in November, 2015. No angina currently  Continue with medical managemen  Currently on Plavix, beta-blocker, atorvastatin    Cardiomyopathy:    His initial ejection fraction in a setting of NSTEMI was 30-35% in November 2015. A repeat EF remains 30% in April 2016, despite being on appropriate medical management. S/P Bi-V AICD placement by  in 07/16. LVEF 25% by echo in 07/2021   no ICD shock. Continue  metoprolol and Entresto and Lasix    Atrial fibrillation: This was diagnosed during routine AICD interrogation in 04/2020. Currently on metoprolol, amiodarone, apixaban    Hypertension:  BP is 101/70 mm hg. Continue same meds. Hyperlipidemia:  He is on atorvastatin 80 mg daily. Continue same    This plan was discussed with patient who is in agreement. Thank you for allowing me to participate in patient care. Please feel free to call me if you have any question or concern. Silver Beauchamp MD  Please note: This document has been produced using voice recognition software. Unrecognized errors in transcription may be present.

## 2021-09-21 ENCOUNTER — PATIENT OUTREACH (OUTPATIENT)
Dept: CASE MANAGEMENT | Age: 66
End: 2021-09-21

## 2021-09-21 RX ORDER — ACETAMINOPHEN 325 MG/1
650 TABLET ORAL
COMMUNITY

## 2021-09-21 RX ORDER — MELATONIN 10 MG
CAPSULE ORAL
COMMUNITY

## 2021-09-21 RX ORDER — BISMUTH SUBSALICYLATE 262 MG
1 TABLET,CHEWABLE ORAL DAILY
COMMUNITY

## 2021-09-21 RX ORDER — MULTIVIT WITH MINERALS/HERBS
1 TABLET ORAL DAILY
COMMUNITY

## 2021-09-21 RX ORDER — GUAIFENESIN 600 MG/1
600 TABLET, EXTENDED RELEASE ORAL 2 TIMES DAILY
COMMUNITY
End: 2022-06-03

## 2021-09-21 NOTE — PROGRESS NOTES
Complex Case Management      Date/Time:  2021 4:00 PM    Method of communication with patient:phone    [address] [facility] ([facility]) contacted the patient by telephone to perform Ambulatory Care Coordination. Verified name and  (PHI) with patient as identifiers. Provided introduction to self, and explanation of the Ambulatory Care Manager's role. Reviewed most recent clinic visit w/ patient who verbalized understanding. Patient given an opportunity to ask questions. Top Challenges reviewed with the patient   1. Patient requested that [facility] review medications with his niece- Tricia Crawford- stating that she is his MPOA  2. FSBS after breakfast was 103  3. Reports that his niece performs wound care to right foot     The patient agrees to contact the PCP office or the [facility] for questions related to their healthcare. Provided contact information for future reference. Disease Specific:   CHF - weight 205 lbs. Denies any edema except for left foot. Denies any shortness of breath    Home Health Active: states he refused  [facility]    DME Active: No    Barriers to care? None noted at present. His niece manages medications, provides appropriate meals and performs wound care. Advance Care Planning:   Does patient have an Advance Directive:  reviewed and current     Medication(s):   Medication reconciliation was performed with Mrs. Bentley (at patient's request), who verbalizes understanding of administration of home medications. There were no barriers to obtaining medications identified at this time. Referral to Pharm D needed: no     Current Outpatient Medications   Medication Sig    acetaminophen (TylenoL) 325 mg tablet Take 650 mg by mouth every six (6) hours as needed for Pain.  multivitamin (ONE A DAY) tablet Take 1 Tablet by mouth daily.  melatonin 10 mg capsule Take  by mouth nightly.  b complex vitamins tablet Take 1 Tablet by mouth daily.     guaiFENesin ER (Mucinex) 600 mg ER tablet Take 600 mg by mouth two (2) times a day.  amiodarone (CORDARONE) 200 mg tablet Take 200 mg by mouth daily.  furosemide (LASIX) 20 mg tablet Take 20 mg by mouth two (2) times a day.  albuterol-ipratropium (DUO-NEB) 2.5 mg-0.5 mg/3 ml nebu Take 3 mL by inhalation every four (4) hours as needed.  metoprolol succinate (TOPROL-XL) 25 mg XL tablet Take 25 mg by mouth daily.  oxyCODONE IR (ROXICODONE) 5 mg immediate release tablet Take 5 mg by mouth every six (6) hours as needed.  spironolactone (ALDACTONE) 25 mg tablet Take 12.5 mg by mouth daily.  cetirizine (ZYRTEC) 10 mg tablet Take 1 Tablet by mouth daily as needed for Allergies.  gabapentin (NEURONTIN) 300 mg capsule Take 2 Capsules by mouth three (3) times daily. Max Daily Amount: 1,800 mg.    insulin glargine (LANTUS,BASAGLAR) 100 unit/mL (3 mL) inpn Give 15 units subcutaneously daily    cholecalciferol (VITAMIN D3) (1000 Units /25 mcg) tablet Take 1 Tablet by mouth two (2) times a day.  clopidogreL (PLAVIX) 75 mg tab Take 1 Tablet by mouth daily.  apixaban (ELIQUIS) 5 mg tablet Take 1 Tablet by mouth two (2) times a day.  sacubitriL-valsartan (Entresto) 49-51 mg tab tablet Take 1 Tab by mouth two (2) times a day.  fluticasone propionate (Flonase Allergy Relief) 50 mcg/actuation nasal spray 2 Sprays by Both Nostrils route daily.  nitroglycerin (NITROSTAT) 0.4 mg SL tablet 0.4 mg by SubLINGual route every five (5) minutes as needed for Chest Pain. Up to 3 doses.  cetaphil (CETAPHIL) topical cream Apply  to affected area as needed (dry skin).  atorvastatin (LIPITOR) 80 mg tablet Take 1 Tab by mouth nightly.  albuterol (PROVENTIL HFA, VENTOLIN HFA, PROAIR HFA) 90 mcg/actuation inhaler Take 2 Puffs by inhalation every six (6) hours as needed for Wheezing.  famotidine (PEPCID) 20 mg tablet Take 20 mg by mouth two (2) times a day.  (Patient not taking: Reported on 9/21/2021)    senna-docusate (PERICOLACE) 8.6-50 mg per tablet Take 1 Tablet by mouth two (2) times a day. (Patient not taking: Reported on 9/21/2021)     No current facility-administered medications for this visit.        [facility] follow up appointment(s):   Future Appointments   Date Time Provider Deondre Antonio   12/8/2021  3:45 PM CSILO [facility] BS AMB   12/14/2021  9:00 AM Carlos Miller MD [facility] BS AMB   1/18/2022 11:00 AM Carlos Miller MD [facility] BS AMB   3/9/2022  1:15 PM CSILO [facility] BS AMB   6/21/2022  9:45 AM CSILO [facility] BS AMB        Non-[facility] follow up appointment(s): NA    Goals Addressed                 This Visit's Progress     Assistance with self care to prevent worsening        Patient's niece will continue to manage medications  Patient will monitor blood glucose at least daily  Patient will weigh self daily  Patient will be able to verbalize s/s CHF and when to contact physician/go to ED  Follow low sodium/low fat AHA/ADA diet

## 2021-09-22 DIAGNOSIS — I24.9 ACS (ACUTE CORONARY SYNDROME) (HCC): Primary | ICD-10-CM

## 2021-09-22 RX ORDER — NITROGLYCERIN 0.4 MG/1
TABLET SUBLINGUAL
Qty: 10 TABLET | Refills: 0 | Status: SHIPPED | OUTPATIENT
Start: 2021-09-22

## 2021-10-01 ENCOUNTER — PATIENT OUTREACH (OUTPATIENT)
Dept: CASE MANAGEMENT | Age: 66
End: 2021-10-01

## 2021-10-01 NOTE — PROGRESS NOTES
Complex Case Management      Date/Time:  10/1/2021 3:36 PM    Method of communication with patient:phone    [address] [facility] ([facility]) contacted the patient by telephone to perform Ambulatory Care Coordination. Verified name and  (PHI) with patient as identifiers. Provided introduction to self, and explanation of the Ambulatory Care Manager's role. Reviewed most recent clinic visit w/ patient who verbalized understanding. Patient given an opportunity to ask questions. Top Challenges reviewed with the patient   1. Patient attended appointment with podiatrist today, says that \"everything going good\" right foot wound is healing  2. FFSBS 101 today     The patient agrees to contact the PCP office or the Ambulatory Care Manager for questions related to their healthcare. Provided contact information for future reference. Disease Specific:   CHF - weight 203 lbs, denies any shortness of breath. Reports that he has 'some' swelling in his left leg. Medication(s):   Medication reconciliation was not performed with patient or neice.        [facility] follow up appointment(s):   Future Appointments   Date Time Provider Deondre Antonio   2021  3:45 PM CSI, PACER HV [facility] BS AMB   2021  9:00 AM Mya Benson MD [facility] BS AMB   2022 11:00 AM Mya Benson MD [facility] BS AMB   3/9/2022  1:15 PM CSI, PACER HV [facility] BS AMB   2022  9:45 AM CSI, PACER NORF [facility] BS AMB        Non-[facility] follow up appointment(s): Vascular appointment 10/5/21    Goals Addressed                 This Visit's Progress     Assistance with self care to prevent worsening   On track     Patient's niece will continue to manage medications  Patient will monitor blood glucose at least daily  Patient will weigh self daily  Patient will be able to verbalize s/s CHF and when to contact physician/go to ED  Follow low sodium/low fat AHA/ADA diet

## 2021-10-12 ENCOUNTER — TELEPHONE (OUTPATIENT)
Dept: CARDIOLOGY CLINIC | Age: 66
End: 2021-10-12

## 2021-10-12 NOTE — TELEPHONE ENCOUNTER
Contacted pt at [facility] number. Two patient Identifiers confirmed. Advised pt medication was not listed as a current medication. Pt verbalized understanding.

## 2021-10-12 NOTE — TELEPHONE ENCOUNTER
Patient called to check on medications. Patient states he was prescribed NIFEDIPINE by Tobin Vega primary physician. Patient thought he was no longer supposed to be taking this medication.

## 2021-10-13 ENCOUNTER — PATIENT OUTREACH (OUTPATIENT)
Dept: CASE MANAGEMENT | Age: 66
End: 2021-10-13

## 2021-10-13 NOTE — PROGRESS NOTES
Complex Case Management      Date/Time:  10/13/2021 9:08 AM    Method of communication with patient:phone    [address] [facility] ([facility]) contacted the patient by telephone to perform Ambulatory Care Coordination. Verified name and  (PHI) with patient as identifiers. Provided introduction to self, and explanation of the Ambulatory Care Manager's role. Reviewed most recent clinic visit w/ patient who verbalized understanding. Patient given an opportunity to ask questions. Top Challenges reviewed with the patient   1. Patient requests that [facility] contact his niece - Mirza Cruz - to perform medication reconciliation. 2. Has not taken BS or weighed self yet today (states it is too early). FSBS on 10/12/21 after eating was 142.   3. Foot wound care done daily by family member, he states that it is still painful but \"looks like it is healing\". The patient agrees to contact the PCP office or the [facility] for questions related to their healthcare. Provided contact information for future reference. Disease Specific:   CHF - reports left ankle \"a little bit swollen\", weight on 10/12/21 was 207 lbs. Denies shortness of breath, chest pain or wheezes at this time. Medication(s):   Medication reconciliation was performed with angela'harriet montez Mrs. Tremaine Samayoa (with patient's permission), who verbalizes understanding of administration of home medications. There were barriers to obtaining medications identified at this time. Referral to Pharm D needed: no     Current Outpatient Medications   Medication Sig    nitroglycerin (NITROSTAT) 0.4 mg SL tablet Up to 3 doses. 0.4 mg by SubLINGual route every five (5) minutes as needed for Chest Pain. Up to 3 doses.  acetaminophen (TylenoL) 325 mg tablet Take 650 mg by mouth every six (6) hours as needed for Pain.  multivitamin (ONE A DAY) tablet Take 1 Tablet by mouth daily.  melatonin 10 mg capsule Take  by mouth nightly.     b complex vitamins tablet Take 1 Tablet by mouth daily.  amiodarone (CORDARONE) 200 mg tablet Take 200 mg by mouth daily.  furosemide (LASIX) 20 mg tablet Take 20 mg by mouth two (2) times a day.  albuterol-ipratropium (DUO-NEB) 2.5 mg-0.5 mg/3 ml nebu Take 3 mL by inhalation every four (4) hours as needed.  metoprolol succinate (TOPROL-XL) 25 mg XL tablet Take 25 mg by mouth daily.  oxyCODONE IR (ROXICODONE) 5 mg immediate release tablet Take 5 mg by mouth every six (6) hours as needed.  spironolactone (ALDACTONE) 25 mg tablet Take 12.5 mg by mouth daily.  cetirizine (ZYRTEC) 10 mg tablet Take 1 Tablet by mouth daily as needed for Allergies.  gabapentin (NEURONTIN) 300 mg capsule Take 2 Capsules by mouth three (3) times daily. Max Daily Amount: 1,800 mg.    insulin glargine (LANTUS,BASAGLAR) 100 unit/mL (3 mL) inpn Give 15 units subcutaneously daily    cholecalciferol (VITAMIN D3) (1000 Units /25 mcg) tablet Take 1 Tablet by mouth two (2) times a day.  clopidogreL (PLAVIX) 75 mg tab Take 1 Tablet by mouth daily.  apixaban (ELIQUIS) 5 mg tablet Take 1 Tablet by mouth two (2) times a day.  sacubitriL-valsartan (Entresto) 49-51 mg tab tablet Take 1 Tab by mouth two (2) times a day.  fluticasone propionate (Flonase Allergy Relief) 50 mcg/actuation nasal spray 2 Sprays by Both Nostrils route daily.  cetaphil (CETAPHIL) topical cream Apply  to affected area as needed (dry skin).  atorvastatin (LIPITOR) 80 mg tablet Take 1 Tab by mouth nightly.  albuterol (PROVENTIL HFA, VENTOLIN HFA, PROAIR HFA) 90 mcg/actuation inhaler Take 2 Puffs by inhalation every six (6) hours as needed for Wheezing.  guaiFENesin ER (Mucinex) 600 mg ER tablet Take 600 mg by mouth two (2) times a day. (Patient not taking: Reported on 10/13/2021)    famotidine (PEPCID) 20 mg tablet Take 20 mg by mouth two (2) times a day.  (Patient not taking: Reported on 9/21/2021)    senna-docusate (PERICOLACE) 8.6-50 mg per tablet Take 1 Tablet by mouth two (2) times a day. (Patient not taking: Reported on 9/21/2021)     No current facility-administered medications for this visit.        [facility] follow up appointment(s):   Future Appointments   Date Time Provider Deondre Antonio   12/8/2021  3:45 PM CSI, PACER HV [facility] BS AMB   12/14/2021  9:00 AM Maritza Hernandez MD [facility] BS AMB   1/18/2022 11:00 AM Maritza Hernandez MD [facility] BS AMB   3/9/2022  1:15 PM CSI, PACER HV [facility] BS AMB   6/21/2022  9:45 AM CSILO [facility] BS AMB        Non-[facility] follow up appointment(s): Patient states that he is to have angiogram at [facility] on 10/20/21    Goals Addressed                 This Visit's Progress     Assistance with self care to prevent worsening   On track     Patient's niece will continue to manage medications  Patient will monitor blood glucose at least daily  Patient will weigh self daily  Patient will be able to verbalize s/s CHF and when to contact physician/go to ED  Follow low sodium/low fat AHA/ADA diet

## 2021-10-21 ENCOUNTER — PATIENT OUTREACH (OUTPATIENT)
Dept: CASE MANAGEMENT | Age: 66
End: 2021-10-21

## 2021-10-21 NOTE — PROGRESS NOTES
Complex Case Management      Date/Time:  10/21/2021 1:22 PM     Attempted to reach patient by telephone. Left HIPPA compliant message requesting a return call. Will attempt to reach patient at a later time.

## 2021-11-01 RX ORDER — SACUBITRIL AND VALSARTAN 49; 51 MG/1; MG/1
TABLET, FILM COATED ORAL
Qty: 60 TABLET | Refills: 5 | Status: SHIPPED | OUTPATIENT
Start: 2021-11-01 | End: 2022-05-06 | Stop reason: SDUPTHER

## 2021-11-01 NOTE — TELEPHONE ENCOUNTER
PCP: Concha Coles MD    Last appt: 9/10/2021  Future Appointments   Date Time Provider Deondre Antonio   12/8/2021  3:45 PM CSI, PACER HV [facility]   12/14/2021  9:00 AM Sherrie Hinds MD [facility]   1/18/2022 11:00 AM Sherrie Hinds MD [facility]   3/9/2022  1:15 PM CSI, PACER HV [facility]   6/21/2022  9:45 AM CSI, PACER NORF [facility]       Requested Prescriptions     Pending Prescriptions Disp Refills    Entresto 49-51 mg tab tablet [Pharmacy Med Name: ENTRESTO 49-51MG TABS] 60 Tablet 5     Sig: TAKE 1 TABLET BY MOUTH 2 TIMES A DAY

## 2021-11-01 NOTE — TELEPHONE ENCOUNTER
Requested Prescriptions     Pending Prescriptions Disp Refills    Entresto 49-51 mg tab tablet [Pharmacy Med Name: ENTRESTO 49-51MG TABS] 60 Tablet 5     Sig: TAKE 1 TABLET BY MOUTH 2 TIMES A DAY

## 2021-11-02 ENCOUNTER — TELEPHONE (OUTPATIENT)
Dept: CARDIOLOGY CLINIC | Age: 66
End: 2021-11-02

## 2021-11-02 NOTE — TELEPHONE ENCOUNTER
Patient further requests alternative to [facility]. Patient seems to be in the 'doughnut' hole for medicare.

## 2021-11-02 NOTE — TELEPHONE ENCOUNTER
Contacted pt at [facility]. Two patient Identifiers confirmed. Advised pt per Dr Hubert Salas. Pt verbalized understanding and stated he wants to continue on the entresto and will pay out of pocket 103.00 monthly until he is out of the donut hole.

## 2021-11-09 ENCOUNTER — PATIENT OUTREACH (OUTPATIENT)
Dept: CASE MANAGEMENT | Age: 66
End: 2021-11-09

## 2021-11-09 NOTE — PROGRESS NOTES
Complex Case Management       Date/Time:  11/9/2021 1:35 PM     Attempted to reach patient by telephone. Left HIPPA compliant message requesting a return call. Will attempt to reach patient at a later time.

## 2021-11-16 ENCOUNTER — PATIENT OUTREACH (OUTPATIENT)
Dept: CASE MANAGEMENT | Age: 66
End: 2021-11-16

## 2021-11-16 NOTE — PROGRESS NOTES
Date/Time:  2021 11:24 AM    Method of communication with patient:phone    [facility] ([facility]) contacted the patient by telephone to perform Ambulatory Care Coordination. Verified name and  (PHI) with patient as identifiers. Patient requests [facility] call back 'later' as he is in grocery store 'right now'. Complex Case Management      Date/Time:  2021 4:20 PM    Method of communication with patient:phone    [facility] ([facility]) contacted the patient by telephone to perform Ambulatory Care Coordination. Verified name and  (PHI) with patient as identifiers. Provided introduction to self, and explanation of the Ambulatory Care Manager's role. [facility] contacted patient's niece at his request to review medications. Reviewed most recent clinic visit w/ patient who verbalized understanding. Patient given an opportunity to ask questions. Top Challenges reviewed with the patient   1. Patient states that he has not checked blood sugar in \"a couple of days\" that last reading was 105 before breakfast.   2. States his right foot wound continues to be painful but that the swelling has gone down. Niece states that wound is healing very slowly but no signs of infection noted. 3. Patient had Angiogram on 10/20/21, no issues noted. The patient agrees to contact the PCP office or the [address] [place] [address] for questions related to their healthcare. Provided contact information for future reference. Disease Specific:   CHF - states he has not weighed self \"in a couple of days\", last weight was 202 lbs. Denies any edema, shortness of breath, chest pain or wheezes at present. Medication(s):   Medication reconciliation was performed with patient's neice, Sivakumar Eri Jailene (at patient's request), who verbalizes understanding of administration of home medications. There were no barriers to obtaining medications identified at this time.     Referral to Pharm D needed: no     Current Outpatient Medications   Medication Sig    Entresto 49-51 mg tab tablet TAKE 1 TABLET BY MOUTH 2 TIMES A DAY    nitroglycerin (NITROSTAT) 0.4 mg SL tablet Up to 3 doses. 0.4 mg by SubLINGual route every five (5) minutes as needed for Chest Pain. Up to 3 doses.  acetaminophen (TylenoL) 325 mg tablet Take 650 mg by mouth every six (6) hours as needed for Pain.  multivitamin (ONE A DAY) tablet Take 1 Tablet by mouth daily.  melatonin 10 mg capsule Take  by mouth nightly.  b complex vitamins tablet Take 1 Tablet by mouth daily.  amiodarone (CORDARONE) 200 mg tablet Take 200 mg by mouth daily.  furosemide (LASIX) 20 mg tablet Take 20 mg by mouth two (2) times a day.  albuterol-ipratropium (DUO-NEB) 2.5 mg-0.5 mg/3 ml nebu Take 3 mL by inhalation every four (4) hours as needed.  metoprolol succinate (TOPROL-XL) 25 mg XL tablet Take 25 mg by mouth daily.  spironolactone (ALDACTONE) 25 mg tablet Take 12.5 mg by mouth daily.  cetirizine (ZYRTEC) 10 mg tablet Take 1 Tablet by mouth daily as needed for Allergies.  gabapentin (NEURONTIN) 300 mg capsule Take 2 Capsules by mouth three (3) times daily. Max Daily Amount: 1,800 mg.    insulin glargine (LANTUS,BASAGLAR) 100 unit/mL (3 mL) inpn Give 15 units subcutaneously daily    cholecalciferol (VITAMIN D3) (1000 Units /25 mcg) tablet Take 1 Tablet by mouth two (2) times a day.  clopidogreL (PLAVIX) 75 mg tab Take 1 Tablet by mouth daily.  apixaban (ELIQUIS) 5 mg tablet Take 1 Tablet by mouth two (2) times a day.  fluticasone propionate (Flonase Allergy Relief) 50 mcg/actuation nasal spray 2 Sprays by Both Nostrils route daily.  cetaphil (CETAPHIL) topical cream Apply  to affected area as needed (dry skin).  atorvastatin (LIPITOR) 80 mg tablet Take 1 Tab by mouth nightly.  albuterol (PROVENTIL HFA, VENTOLIN HFA, PROAIR HFA) 90 mcg/actuation inhaler Take 2 Puffs by inhalation every six (6) hours as needed for Wheezing.     guaiFENesin ER (Mucinex) 600 mg ER tablet Take 600 mg by mouth two (2) times a day. (Patient not taking: Reported on 10/13/2021)    famotidine (PEPCID) 20 mg tablet Take 20 mg by mouth two (2) times a day. (Patient not taking: Reported on 9/21/2021)    oxyCODONE IR (ROXICODONE) 5 mg immediate release tablet Take 5 mg by mouth every six (6) hours as needed. (Patient not taking: Reported on 11/16/2021)    senna-docusate (PERICOLACE) 8.6-50 mg per tablet Take 1 Tablet by mouth two (2) times a day. (Patient not taking: Reported on 9/21/2021)     No current facility-administered medications for this visit.        [facility] follow up appointment(s):   Future Appointments   Date Time Provider Deondre Antonio   12/8/2021  3:45 PM CSI, PACER HV [facility] BS AMB   12/14/2021  9:00 AM Jackson Lane MD [facility] BS AMB   1/18/2022 11:00 AM Jackson Lane MD [facility] BS AMB   3/9/2022  1:15 PM CSILO [facility] BS AMB   6/21/2022  9:45 AM CSI, PACER NORF [facility] BS AMB        Non-[facility] follow up appointment(s): NA    Goals Addressed                 This Visit's Progress     Assistance with self care to prevent worsening   On track     Patient's niece will continue to manage medications  Patient will monitor blood glucose at least daily  Patient will weigh self daily  Patient will be able to verbalize s/s CHF and when to contact physician/go to ED  Follow low sodium/low fat AHA/ADA diet

## 2021-11-26 ENCOUNTER — PATIENT OUTREACH (OUTPATIENT)
Dept: CASE MANAGEMENT | Age: 66
End: 2021-11-26

## 2021-11-26 NOTE — PROGRESS NOTES
Complex Case Management      Date/Time:  2021 3:59 PM    Method of communication with patient:phone    [facility] ([facility]) contacted the patient by telephone to perform Ambulatory Care Coordination. Verified name and  (PHI) with patient as identifiers. Provided introduction to self, and explanation of the Ambulatory Care Manager's role. Reviewed most recent clinic visit w/ patient who verbalized understanding. Patient given an opportunity to ask questions. Top Challenges reviewed with the patient   1. States BS taken after breakfast was 128 yesterday, did not check it today. 2. Continues to complain of wound pain and 'neuropathy' pain in both feet \" up near the toes\". The patient agrees to contact the PCP office or the [facility] for questions related to their healthcare. Provided contact information for future reference. Disease Specific:   CHF - weight 203 lbs on 21. Denies any edema or shortness of breath. States he is watching what he eats. Medication(s):   Medication reconciliation was performed with patient's melida, Mrs. Juanita Phan ([facility] gave [facility] permission to call melida). , who verbalizes understanding of administration of home medications. There were no barriers to obtaining medications identified at this time. Referral to Pharm D needed: no     Current Outpatient Medications   Medication Sig    Entresto 49-51 mg tab tablet TAKE 1 TABLET BY MOUTH 2 TIMES A DAY    nitroglycerin (NITROSTAT) 0.4 mg SL tablet Up to 3 doses. 0.4 mg by SubLINGual route every five (5) minutes as needed for Chest Pain. Up to 3 doses.  acetaminophen (TylenoL) 325 mg tablet Take 650 mg by mouth every six (6) hours as needed for Pain.  multivitamin (ONE A DAY) tablet Take 1 Tablet by mouth daily.  melatonin 10 mg capsule Take  by mouth nightly.  b complex vitamins tablet Take 1 Tablet by mouth daily.     amiodarone (CORDARONE) 200 mg tablet Take 200 mg by mouth daily.    furosemide (LASIX) 20 mg tablet Take 20 mg by mouth two (2) times a day.  albuterol-ipratropium (DUO-NEB) 2.5 mg-0.5 mg/3 ml nebu Take 3 mL by inhalation every four (4) hours as needed.  metoprolol succinate (TOPROL-XL) 25 mg XL tablet Take 25 mg by mouth daily.  spironolactone (ALDACTONE) 25 mg tablet Take 12.5 mg by mouth daily.  cetirizine (ZYRTEC) 10 mg tablet Take 1 Tablet by mouth daily as needed for Allergies.  gabapentin (NEURONTIN) 300 mg capsule Take 2 Capsules by mouth three (3) times daily. Max Daily Amount: 1,800 mg.    insulin glargine (LANTUS,BASAGLAR) 100 unit/mL (3 mL) inpn Give 15 units subcutaneously daily    cholecalciferol (VITAMIN D3) (1000 Units /25 mcg) tablet Take 1 Tablet by mouth two (2) times a day.  clopidogreL (PLAVIX) 75 mg tab Take 1 Tablet by mouth daily.  apixaban (ELIQUIS) 5 mg tablet Take 1 Tablet by mouth two (2) times a day.  fluticasone propionate (Flonase Allergy Relief) 50 mcg/actuation nasal spray 2 Sprays by Both Nostrils route daily.  cetaphil (CETAPHIL) topical cream Apply  to affected area as needed (dry skin).  atorvastatin (LIPITOR) 80 mg tablet Take 1 Tab by mouth nightly.  albuterol (PROVENTIL HFA, VENTOLIN HFA, PROAIR HFA) 90 mcg/actuation inhaler Take 2 Puffs by inhalation every six (6) hours as needed for Wheezing.  guaiFENesin ER (Mucinex) 600 mg ER tablet Take 600 mg by mouth two (2) times a day. (Patient not taking: Reported on 10/13/2021)    famotidine (PEPCID) 20 mg tablet Take 20 mg by mouth two (2) times a day. (Patient not taking: Reported on 9/21/2021)    oxyCODONE IR (ROXICODONE) 5 mg immediate release tablet Take 5 mg by mouth every six (6) hours as needed. (Patient not taking: Reported on 11/16/2021)    senna-docusate (PERICOLACE) 8.6-50 mg per tablet Take 1 Tablet by mouth two (2) times a day. (Patient not taking: Reported on 9/21/2021)     No current facility-administered medications for this visit. [facility] follow up appointment(s):   Future Appointments   Date Time Provider Deondre Marisela   12/8/2021  3:45 PM CSLO TALBOT [facility] BS AMB   12/14/2021  9:00 AM Osbaldo Stoner MD [facility] BS AMB   1/18/2022 11:00 AM Osbaldo Stoner MD [facility] BS AMB   3/9/2022  1:15 PM CSLO TALBOT [facility] AMB   6/21/2022  9:45 AM CSI, PACER NORF [facility] AMB        Non-[facility] follow up appointment(s): NA    Goals Addressed                 This Visit's Progress     Assistance with self care to prevent worsening   On track     Patient's niece will continue to manage medications  Patient will monitor blood glucose at least daily  Patient will weigh self daily  Patient will be able to verbalize s/s CHF and when to contact physician/go to ED  Follow low sodium/low fat AHA/ADA diet      11/26/21  Able to verbalize what foods to eat, which to avoid to prevent hyperglycemia/CHF exacerbation

## 2021-11-30 ENCOUNTER — PATIENT OUTREACH (OUTPATIENT)
Dept: CASE MANAGEMENT | Age: 66
End: 2021-11-30

## 2021-11-30 NOTE — PROGRESS NOTES
Complex Case Management       Date/Time:  11/30/2021 3:45 PM     Attempted to reach patient by telephone. Left HIPPA compliant message requesting a return call. Will attempt to reach patient at a later time.

## 2021-12-03 ENCOUNTER — TELEPHONE (OUTPATIENT)
Dept: CARDIOLOGY CLINIC | Age: 66
End: 2021-12-03

## 2021-12-03 DIAGNOSIS — I10 ESSENTIAL HYPERTENSION: ICD-10-CM

## 2021-12-03 NOTE — TELEPHONE ENCOUNTER
PCP: Zoila Nelson MD    Last appt: 9/10/2021  Future Appointments   Date Time Provider Deondre Antonio   12/8/2021  3:45 PM CSI, PACER [facility]   12/14/2021  9:00 AM Anson Mancia MD [facility]   3/9/2022  1:15 PM CSI, PACER HV [facility]   6/21/2022  9:45 AM CSI, PACER NORF [facility] AMB       Requested Prescriptions     Pending Prescriptions Disp Refills    atorvastatin (LIPITOR) 80 mg tablet 90 Tablet 3     Sig: Take 1 Tablet by mouth nightly.

## 2021-12-03 NOTE — TELEPHONE ENCOUNTER
Contacted pt at [facility] number  Two patient Identifiers confirmed. [facility]. Pt stated he would like to p/u a  rx for med to take to [facility]. Advised aspirin would be evaluated at [address] Pt verbalized understanding.

## 2021-12-03 NOTE — TELEPHONE ENCOUNTER
Patient called to ask why he is not on a low dose aspirin with his eliquis. Patient read in medication documentation that a low dose aspirin is highly suggested with eliquis. Patient would also like to know why atorvastatin was discontinued.

## 2021-12-06 RX ORDER — ATORVASTATIN CALCIUM 80 MG/1
80 TABLET, FILM COATED ORAL
Qty: 90 TABLET | Refills: 3 | Status: SHIPPED | OUTPATIENT
Start: 2021-12-06 | End: 2022-06-14

## 2021-12-21 ENCOUNTER — PATIENT OUTREACH (OUTPATIENT)
Dept: CASE MANAGEMENT | Age: 66
End: 2021-12-21

## 2021-12-21 NOTE — PROGRESS NOTES
Heart Failure Education outreach Date/Time: 2021 1:12 PM    Ambulatory Care Manager (ACM) contacted the patient by telephone to perform Ambulatory Care Coordination. Verified name and  with patient as identifiers. Provided introduction to self, and explanation of the Ambulatory Care Manager's role. ACM reviewed daily weights, fluid restriction, the importance of low sodium diet with the patient. Instructed patient to call their Cardiologist if they have a weight gain of 3 lbs in 2 days or 5 lbs in a week. Patient reminded that there is a physician on call 24 hours a day / 7 days a week should the patient have questions or concerns. The patient verbalized understanding. Patient has graduated from the Complex Case Management  program on 21. Patient's symptoms are stable at this time. Patient/family has the ability to self-manage. Care management goals have been completed at this time. No further ACM follow up scheduled. Goals Addressed                 This Visit's Progress     COMPLETED: Assistance with self care to prevent worsening        Patient's niece will continue to manage medications  Patient will monitor blood glucose at least daily  Patient will weigh self daily  Patient will be able to verbalize s/s CHF and when to contact physician/go to ED  Follow low sodium/low fat AHA/ADA diet      21  Able to verbalize what foods to eat, which to avoid to prevent hyperglycemia/CHF exacerbation              Pt has ACM's contact information for any further questions, concerns, or needs.   Patients upcoming visits:    Future Appointments   Date Time Provider Deondre Antonio   3/9/2022  1:15 PM CSI, PACER HV [facility]   2022  9:45 AM CSI, PACER NORF [facility] BS AMB

## 2022-03-09 ENCOUNTER — OFFICE VISIT (OUTPATIENT)
Dept: CARDIOLOGY CLINIC | Age: 67
End: 2022-03-09
Payer: MEDICARE

## 2022-03-09 DIAGNOSIS — I25.5 ISCHEMIC CARDIOMYOPATHY: Primary | ICD-10-CM

## 2022-03-09 DIAGNOSIS — Z95.810 AICD (AUTOMATIC CARDIOVERTER/DEFIBRILLATOR) PRESENT: ICD-10-CM

## 2022-03-09 PROCEDURE — 93295 DEV INTERROG REMOTE 1/2/MLT: CPT | Performed by: INTERNAL MEDICINE

## 2022-03-19 PROBLEM — I25.10 CORONARY ARTERY DISEASE INVOLVING NATIVE CORONARY ARTERY OF NATIVE HEART WITHOUT ANGINA PECTORIS: Status: ACTIVE | Noted: 2017-10-26

## 2022-03-19 PROBLEM — E11.21 TYPE 2 DIABETES WITH NEPHROPATHY (HCC): Status: ACTIVE | Noted: 2019-03-26

## 2022-03-19 PROBLEM — Z71.89 ADVANCE CARE PLANNING: Status: ACTIVE | Noted: 2019-10-04

## 2022-03-20 PROBLEM — I10 ESSENTIAL HYPERTENSION: Status: ACTIVE | Noted: 2017-07-26

## 2022-03-28 NOTE — PROGRESS NOTES
I have personally seen and evaluated the device findings. Interrogation reviewed and I agree with assessment.     Tadeo Miller

## 2022-05-06 NOTE — TELEPHONE ENCOUNTER
PCP: Gregorio Saldana MD    Last appt: 12/14/2021  Future Appointments   Date Time Provider Deondre Antonio   5/27/2022 10:15 AM Gregorio Saldana MD [facility]   6/14/2022  2:30 PM Bharath Pierre MD [facility]   6/21/2022  9:45 AM CSI, PACER NORF [facility]       Requested Prescriptions     Pending Prescriptions Disp Refills    sacubitriL-valsartan (Entresto) 49-51 mg tab tablet 60 Tablet 3     Sig: TAKE 1 TABLET BY MOUTH 2 TIMES A DAY

## 2022-05-06 NOTE — TELEPHONE ENCOUNTER
Requested Prescriptions     Pending Prescriptions Disp Refills    sacubitriL-valsartan (Entresto) 49-51 mg tab tablet 60 Tablet 5       Patient scheduled to be seen on [place] the 14th.  Please send refill prescription that will last until then

## 2022-05-09 RX ORDER — SACUBITRIL AND VALSARTAN 49; 51 MG/1; MG/1
TABLET, FILM COATED ORAL
Qty: 60 TABLET | Refills: 3 | Status: SHIPPED | OUTPATIENT
Start: 2022-05-09 | End: 2022-08-25 | Stop reason: SDUPTHER

## 2022-05-27 ENCOUNTER — OFFICE VISIT (OUTPATIENT)
Dept: FAMILY MEDICINE CLINIC | Age: 67
End: 2022-05-27
Payer: MEDICARE

## 2022-05-27 ENCOUNTER — HOSPITAL ENCOUNTER (OUTPATIENT)
Dept: LAB | Age: 67
Discharge: HOME | End: 2022-05-27
Payer: MEDICARE

## 2022-05-27 VITALS
HEIGHT: 71 IN | BODY MASS INDEX: 29.12 KG/M2 | RESPIRATION RATE: 16 BRPM | SYSTOLIC BLOOD PRESSURE: 132 MMHG | WEIGHT: 208 LBS | DIASTOLIC BLOOD PRESSURE: 86 MMHG | TEMPERATURE: 97.3 F | HEART RATE: 86 BPM | OXYGEN SATURATION: 100 %

## 2022-05-27 DIAGNOSIS — I48.0 PAF (PAROXYSMAL ATRIAL FIBRILLATION) (HCC): ICD-10-CM

## 2022-05-27 DIAGNOSIS — I73.9 PAD (PERIPHERAL ARTERY DISEASE) (HCC): ICD-10-CM

## 2022-05-27 DIAGNOSIS — Z71.89 ADVANCE CARE PLANNING: ICD-10-CM

## 2022-05-27 DIAGNOSIS — R51.9 NEW ONSET OF HEADACHES AFTER AGE 50: ICD-10-CM

## 2022-05-27 DIAGNOSIS — L97.422 DIABETIC ULCER OF LEFT HEEL ASSOCIATED WITH DIABETES MELLITUS DUE TO UNDERLYING CONDITION, WITH FAT LAYER EXPOSED (HCC): ICD-10-CM

## 2022-05-27 DIAGNOSIS — E11.21 TYPE 2 DIABETES WITH NEPHROPATHY (HCC): ICD-10-CM

## 2022-05-27 DIAGNOSIS — Z00.00 MEDICARE ANNUAL WELLNESS VISIT, SUBSEQUENT: ICD-10-CM

## 2022-05-27 DIAGNOSIS — J43.9 PULMONARY EMPHYSEMA, UNSPECIFIED EMPHYSEMA TYPE (HCC): ICD-10-CM

## 2022-05-27 DIAGNOSIS — S98.111A AMPUTATION OF RIGHT GREAT TOE (HCC): ICD-10-CM

## 2022-05-27 DIAGNOSIS — E08.621 DIABETIC ULCER OF LEFT HEEL ASSOCIATED WITH DIABETES MELLITUS DUE TO UNDERLYING CONDITION, WITH FAT LAYER EXPOSED (HCC): ICD-10-CM

## 2022-05-27 DIAGNOSIS — Z87.891 PERSONAL HISTORY OF TOBACCO USE, PRESENTING HAZARDS TO HEALTH: Primary | ICD-10-CM

## 2022-05-27 DIAGNOSIS — I42.9 CARDIOMYOPATHY, UNSPECIFIED TYPE (HCC): ICD-10-CM

## 2022-05-27 LAB
CHOLEST SERPL-MCNC: 155 MG/DL
CREAT UR-MCNC: 95 MG/DL (ref 30–125)
HDLC SERPL-MCNC: 49 MG/DL (ref 40–60)
HDLC SERPL: 3.2 {RATIO} (ref 0–5)
LDLC SERPL CALC-MCNC: 85.8 MG/DL (ref 0–100)
LIPID PROFILE,FLP: NORMAL
MICROALBUMIN UR-MCNC: 4.53 MG/DL (ref 0–3)
MICROALBUMIN/CREAT UR-RTO: 48 MG/G (ref 0–30)
TRIGL SERPL-MCNC: 101 MG/DL (ref ?–150)
VLDLC SERPL CALC-MCNC: 20.2 MG/DL

## 2022-05-27 PROCEDURE — 3017F COLORECTAL CA SCREEN DOC REV: CPT | Performed by: FAMILY MEDICINE

## 2022-05-27 PROCEDURE — 3046F HEMOGLOBIN A1C LEVEL >9.0%: CPT | Performed by: FAMILY MEDICINE

## 2022-05-27 PROCEDURE — G8752 SYS BP LESS 140: HCPCS | Performed by: FAMILY MEDICINE

## 2022-05-27 PROCEDURE — G8432 DEP SCR NOT DOC, RNG: HCPCS | Performed by: FAMILY MEDICINE

## 2022-05-27 PROCEDURE — 1101F PT FALLS ASSESS-DOCD LE1/YR: CPT | Performed by: FAMILY MEDICINE

## 2022-05-27 PROCEDURE — G8427 DOCREV CUR MEDS BY ELIG CLIN: HCPCS | Performed by: FAMILY MEDICINE

## 2022-05-27 PROCEDURE — 99214 OFFICE O/P EST MOD 30 MIN: CPT | Performed by: FAMILY MEDICINE

## 2022-05-27 PROCEDURE — 36415 COLL VENOUS BLD VENIPUNCTURE: CPT

## 2022-05-27 PROCEDURE — 2022F DILAT RTA XM EVC RTNOPTHY: CPT | Performed by: FAMILY MEDICINE

## 2022-05-27 PROCEDURE — 82043 UR ALBUMIN QUANTITATIVE: CPT

## 2022-05-27 PROCEDURE — G0296 VISIT TO DETERM LDCT ELIG: HCPCS | Performed by: FAMILY MEDICINE

## 2022-05-27 PROCEDURE — G0439 PPPS, SUBSEQ VISIT: HCPCS | Performed by: FAMILY MEDICINE

## 2022-05-27 PROCEDURE — G8417 CALC BMI ABV UP PARAM F/U: HCPCS | Performed by: FAMILY MEDICINE

## 2022-05-27 PROCEDURE — G8754 DIAS BP LESS 90: HCPCS | Performed by: FAMILY MEDICINE

## 2022-05-27 PROCEDURE — 80061 LIPID PANEL: CPT

## 2022-05-27 PROCEDURE — 99497 ADVNCD CARE PLAN 30 MIN: CPT | Performed by: FAMILY MEDICINE

## 2022-05-27 PROCEDURE — G8536 NO DOC ELDER MAL SCRN: HCPCS | Performed by: FAMILY MEDICINE

## 2022-05-27 NOTE — PROGRESS NOTES
Aida Plummer presents today for No chief complaint on file. Is someone accompanying this pt? Yes his daughter    Is the patient using any DME equipment during [address]? no    Depression Screening:  3 most recent PHQ Screens 5/27/2022   Little interest or pleasure in doing things Not at all   Feeling down, depressed, irritable, or hopeless Not at all   Total Score PHQ 2 0       Learning Assessment:  Learning Assessment 3/16/2017   PRIMARY LEARNER Patient   HIGHEST LEVEL OF EDUCATION - PRIMARY LEARNER  DID NOT GRADUATE [facility] PRIMARY LEARNER NONE   CO-LEARNER CAREGIVER No   PRIMARY LANGUAGE ENGLISH   LEARNER PREFERENCE PRIMARY DEMONSTRATION   ANSWERED BY patient   RELATIONSHIP SELF       Abuse Screening:  Abuse Screening Questionnaire 9/15/2020   Do you ever feel afraid of your partner? N   Are you in a relationship with someone who physically or mentally threatens you? N   Is it safe for you to go home? Y       Fall Risk  Fall Risk Assessment, last 12 mths 5/27/2022   Able to walk? Yes   Fall in past 12 months? 0   Do you feel unsteady? 0   Are you worried about falling 0       Health Maintenance reviewed and discussed and ordered per Provider. Health Maintenance Due   Topic Date Due    Eye Exam Retinal or Dilated  Never done    Shingrix Vaccine Age 50> (1 of 2) Never done    Low dose CT lung screening  Never done    Pneumococcal 65+ years (3 - PPSV23 or PCV20) 10/04/2020    COVID-19 Vaccine (3 - Booster for Moderna series) 07/20/2021    Foot Exam Q1  04/22/2022    MICROALBUMIN Q1  04/22/2022    Lipid Screen  04/22/2022    Medicare Yearly Exam  04/23/2022   . Coordination of Care:  1. Have you been to the ER, urgent care clinic since your last visit? Hospitalized since your last visit? Yes, [facility] ER, wound care. 2. Have you seen or consulted any other health care providers outside of the [address] [place] [address] since your last visit?  Include any pap smears or colon screening.  no      Last  Checked na  Last UDS Checked na  Last Pain contract signed: na    Annual Medicare Wellness Exam

## 2022-05-27 NOTE — PATIENT INSTRUCTIONS
Medicare Wellness Visit, Male    The best way to live healthy is to have a lifestyle where you eat a well-balanced diet, exercise regularly, limit alcohol use, and quit all forms of tobacco/nicotine, if applicable. Regular preventive services are another way to keep healthy. Preventive services (vaccines, screening tests, monitoring & exams) can help personalize your care plan, which helps you manage your own care. Screening tests can find health problems at the earliest stages, when they are easiest to treat. [facility] follows the current, evidence-based guidelines published by the [facility] ([facility]) when recommending preventive services for our patients. Because we follow these guidelines, sometimes recommendations change over time as research supports it. (For example, a prostate screening blood test is no longer routinely recommended for men with no symptoms). Of course, you and your doctor may decide to screen more often for some diseases, based on your risk and co-morbidities (chronic disease you are already diagnosed with). Preventive services for you include:  - Medicare offers their members a free annual wellness visit, which is time for you and your primary care provider to discuss and plan for your preventive service needs. Take advantage of this benefit every year!  -All adults over age 72 should receive the recommended pneumonia vaccines. Current USPSTF guidelines recommend a series of two vaccines for the best pneumonia protection.   -All adults should have a flu vaccine yearly and tetanus vaccine every 10 years.  -All adults age 48 and older should receive the shingles vaccines (series of two vaccines).        -All adults age 38-68 who are overweight should have a diabetes screening test once every three years.   -Other screening tests & preventive services for persons with diabetes include: an eye exam to screen for diabetic retinopathy, a kidney function test, a foot exam, and stricter control over your cholesterol.   -Cardiovascular screening for adults with routine risk involves an electrocardiogram (ECG) at intervals determined by the provider.   -Colorectal cancer screening should be done for adults age 54-65 with no increased risk factors for colorectal cancer. There are a number of acceptable methods of screening for this type of cancer. Each test has its own benefits and drawbacks. Discuss with your provider what is most appropriate for you during your annual wellness visit. The different tests include: colonoscopy (considered the best screening method), a fecal occult blood test, a fecal DNA test, and sigmoidoscopy.  -All adults born between [facility] should be screened once for Hepatitis C.  -An Abdominal Aortic Aneurysm (AAA) Screening is recommended for men age 73-68 who has ever smoked in their lifetime.      Here is a list of your current Health Maintenance items (your personalized list of preventive services) with a due date:  Health Maintenance Due   Topic Date Due    Eye Exam  Never done    Shingles Vaccine (1 of 2) Never done    Smoker or Former Smoker - [address]  Never done    Pneumococcal Vaccine (3 - PPSV23 or PCV20) 10/04/2020    COVID-19 Vaccine (3 - Booster for Moderna series) 07/20/2021    Diabetic Foot Care  04/22/2022    Albumin Urine Test  04/22/2022    Cholesterol Test   04/22/2022    Annual Well Visit  04/23/2022

## 2022-05-27 NOTE — PROGRESS NOTES
(AWV) The Medicare Annual Wellness Exam PROGRESS NOTE    This is a Medicare Annual Wellness Exam (AWV)    I have reviewed the patient's medical history in detail and updated the computerized patient record. Summer Culver is a 75 y.o.  male and presents for an annual wellness exam     ROS   General ROS: negative for - chills, + fatigue, no fever  Psychological ROS: positive for depression; occasional confusion  Ophthalmic ROS: negative for - blurry vision  ENT ROS: positive for - headaches  Endocrine ROS: positive for - polydipsia/polyuria  Respiratory ROS: positive for - shortness of breath  Cardiovascular ROS: positive for - dyspnea on exertion  Gastrointestinal ROS: no abdominal pain, change in bowel habits, or black or bloody stools  Genito-Urinary ROS: no dysuria, trouble voiding, or hematuria; +nocturia  Musculoskeletal ROS: negative for - joint pain  Neurological ROS: positive for - numbness/tingling  Dermatological ROS: positive for - skin lesion changes    All other systems reviewed and are negative.     History     Past Medical History:   Diagnosis Date    Biventricular ICD (implantable cardioverter-defibrillator) in place 07/16    [employer]    CAD (coronary artery disease)     Cardiomyopathy, ischemic     EF 30% (04/16) 30-35% (11/15)    Diabetes ([address]     DVT (deep venous thrombosis) ([address] 08/16    L axillary vein after PPM insertion    HLD (hyperlipidemia)     Hypertension     NSTEMI (non-ST elevated myocardial infarction) ([address]     S/P OM1 2.5 X 23 mm XIENCE (11/15)    PAD [facility]peripheral artery disease) [facility])     S/P LE intervention and thrombectomy    Pulmonary emphysema ([address]     Stroke [facility])       Past Surgical History:   Procedure Laterality Date    COLONOSCOPY N/A 12/14/2017    COLONOSCOPY performed by Maninder Winston MD at [address] ENDOSCOPY    HX PACEMAKER PLACEMENT      VASCULAR SURGERY PROCEDURE UNLIST      Stent placed right thigh     Current Outpatient Medications Medication Sig Dispense Refill    sacubitriL-valsartan (Entresto) 49-51 mg tab tablet TAKE 1 TABLET BY MOUTH 2 TIMES A DAY 60 Tablet 3    atorvastatin (LIPITOR) 80 mg tablet Take 1 Tablet by mouth nightly. 90 Tablet 3    nitroglycerin (NITROSTAT) 0.4 mg SL tablet Up to 3 doses. 0.4 mg by SubLINGual route every five (5) minutes as needed for Chest Pain. Up to 3 doses. 10 Tablet 0    acetaminophen (TylenoL) 325 mg tablet Take 650 mg by mouth every six (6) hours as needed for Pain.  multivitamin (ONE A DAY) tablet Take 1 Tablet by mouth daily.  melatonin 10 mg capsule Take  by mouth nightly.  b complex vitamins tablet Take 1 Tablet by mouth daily.  guaiFENesin ER (Mucinex) 600 mg ER tablet Take 600 mg by mouth two (2) times a day. (Patient not taking: Reported on 10/13/2021)      amiodarone (CORDARONE) 200 mg tablet Take 200 mg by mouth daily.  famotidine (PEPCID) 20 mg tablet Take 20 mg by mouth two (2) times a day. (Patient not taking: Reported on 9/21/2021)      furosemide (LASIX) 20 mg tablet Take 20 mg by mouth two (2) times a day.  albuterol-ipratropium (DUO-NEB) 2.5 mg-0.5 mg/3 ml nebu Take 3 mL by inhalation every four (4) hours as needed.  metoprolol succinate (TOPROL-XL) 25 mg XL tablet Take 25 mg by mouth daily.  oxyCODONE IR (ROXICODONE) 5 mg immediate release tablet Take 5 mg by mouth every six (6) hours as needed. (Patient not taking: Reported on 11/16/2021)      senna-docusate (PERICOLACE) 8.6-50 mg per tablet Take 1 Tablet by mouth two (2) times a day. (Patient not taking: Reported on 9/21/2021)      spironolactone (ALDACTONE) 25 mg tablet Take 12.5 mg by mouth daily.  cetirizine (ZYRTEC) 10 mg tablet Take 1 Tablet by mouth daily as needed for Allergies. 90 Tablet 3    gabapentin (NEURONTIN) 300 mg capsule Take 2 Capsules by mouth three (3) times daily.  Max Daily Amount: 1,800 mg. 180 Capsule 5    insulin glargine (LANTUS,BASAGLAR) 100 unit/mL (3 mL) inpn Give 15 units subcutaneously daily 3 Pen 3    cholecalciferol (VITAMIN D3) (1000 Units /25 mcg) tablet Take 1 Tablet by mouth two (2) times a day. 180 Tablet 3    clopidogreL (PLAVIX) 75 mg tab Take 1 Tablet by mouth daily. 90 Tablet 3    apixaban (ELIQUIS) 5 mg tablet Take 1 Tablet by mouth two (2) times a day. 60 Tablet 5    fluticasone propionate (Flonase Allergy Relief) 50 mcg/actuation nasal spray 2 Sprays by Both Nostrils route daily.  cetaphil (CETAPHIL) topical cream Apply  to affected area as needed (dry skin). 453 g 1    albuterol (PROVENTIL HFA, VENTOLIN HFA, PROAIR HFA) 90 mcg/actuation inhaler Take 2 Puffs by inhalation every six (6) hours as needed for Wheezing. 1 Inhaler 3     Allergies   Allergen Reactions    Lasix [Furosemide] Other (comments)    Levofloxacin Rash    Penicillins Swelling     Family History   Problem Relation Age of Onset    Heart Disease Mother     Heart Disease Father      Social History     Tobacco Use    Smoking status: Former Smoker     Packs/day: 1.50     Years: 30.00     Pack years: 45.00     Types: Cigarettes     Quit date: 2015     Years since quittin.5    Smokeless tobacco: Never Used   Substance Use Topics    Alcohol use: No     Alcohol/week: 0.0 standard drinks     Patient Active Problem List   Diagnosis Code    Type II or unspecified type diabetes mellitus without mention of complication, not stated as uncontrolled E11.9    Acute lacunar stroke ([facility] I63.81    HLD (hyperlipidemia) E78.5    ACS (acute coronary syndrome) ([facility] I24.9    Elevated troponin R77.8    Hearing impairment H91.90    Coronary artery disease involving native coronary artery without angina pectoris I25.10    AICD (automatic cardioverter/defibrillator) present Z95.810    Emphysema of lung ([facility] J43.9    Ex-smoker Z87.891    Restrictive ventilatory defect R94.2    Obesity (BMI 30-39. 9) E66.9    Chronic combined systolic and diastolic heart failure ([facility] I50.42    Deep venous thrombosis ([facility] I82.409    Hypercholesteremia E78.00    Type 2 diabetes mellitus with diabetic neuropathy, with long-term current use of insulin ([facility]) E11.40, Z79.4    Deep vein thrombosis (DVT) of left upper extremity ([facility]) I82.622    PAD (peripheral artery disease) ([facility]) I73.9    Essential hypertension I10    Coronary artery disease involving native coronary artery of native heart without angina pectoris I25.10    Type 2 diabetes with nephropathy ([facility]) E11.21    Advance care planning Z71.89       Health Maintenance History  Immunizations reviewed, dtap up to date , pneumovax up to date, flu up to date, zoster due  Colonoscopy: up to date,   Eye exam:due        Depression Risk Factor Screening:      Patient Health Questionnaire (PHQ-2)   Over the last 2 weeks, how often have you been bothered by any of the following problems? · Little interest or pleasure in doing things? · Several days. [1]  · Feeling down, depressed, or hopeless? · Not at all. [0]    Total Score: 1/6  PHQ-2 Assessment Scoring:   A score of 2 or more requires further screening with the PHQ-9    Alcohol Risk Factor Screening:     Men: On any occasion during the past 3 months, have you had more than 4 drinks containing alcohol? no  Do you average more than 14 drinks per week? no    Functional Ability and Level of Safety:     Hearing Loss    Hearing is good. Activities of Daily Living   Partial assistance. Requires assistance with: dressing    Fall Risk   Secondary diagnoses (15 pts)  Impaired (20 pts)  Score: 35    Abuse Screen   Patient is not abused    Examination   Physical Examination  Vitals:    05/27/22 1021   BP: 132/86   Pulse: 86   Resp: 16   Temp: 97.3 °F (36.3 °C)   TempSrc: Temporal   SpO2: 100%   Weight: 208 lb (94.3 kg)   Height: 5' 11\" (1.803 m)   PainSc:   0 - No pain      Body mass index is 29.01 kg/m².      Evaluation of Cognitive Function:  Mood/affect: good mood  Appearance: well [place]ver[place]  Family member/caregiver input: she is concerned that he is forgetting and has headaches    alert, well appearing, and in no distress, oriented to person, place, and time and overweight    Patient Care Team:  Dalila Brush MD as PCP - General (Family Medicine)  Dalila Brush MD as PCP - REHABILITATION [facility] Empaneled Provider  Rebecca Zapata MD (Cardiovascular Disease Physician)  Jacquelin Salgado NP (Nurse Practitioner)  Feliz Ovalle MD (Surgery Physician)    End-of-life planning  Advanced Directive in the case than an injury or illness causes the patient to be unable to make health care decisions    Health Care Directive or Living Will: yes    Advice/Referrals/Counselling/Plan:   Education and counseling provided:  End-of-Life planning (with patient's consent)  Medical nutrition therapy for individuals with diabetes or renal disease      ICD-10-CM ICD-9-CM    1. Personal history of tobacco use, presenting hazards to health  Z87.891 V15.82 CT LOW DOSE LUNG CANCER SCREENING   2. Advance care planning  Z71.89 V65.49 ADVANCE CARE PLANNING FIRST 30 MINS   3. PAF (paroxysmal atrial fibrillation) ([facility])  I48.0 427.31    4. Pulmonary emphysema, unspecified emphysema type ([facility]  J43.9 492.8    5. Cardiomyopathy, unspecified type ([facility]  I42.9 425.4    6. Type 2 diabetes with nephropathy ([facility])  E11.21 250.40 LIPID PANEL     583.81 MICROALBUMIN, UR, RAND W/ MICROALB/CREAT RATIO       DIABETES FOOT EXAM   7. PAD (peripheral artery disease) ([facility])  I73.9 443.9    8. Medicare annual wellness visit, subsequent  Z00.00 V70.0    9. Diabetic ulcer of left heel associated with diabetes mellitus due to underlying condition, with fat layer exposed ([facility]  E08.621 249.80     L97.422 707.14    10. Amputation of right great toe ([facility]  S98.111A 895.0    11. New onset of headaches after age 48  R51.9 784.0 CT HEAD WO CONT   .   Brief written plan, checklist    I have discussed the diagnosis with the patient and the intended plan as seen in the above orders. The patient has received an after-visit summary and questions were answered concerning future plans. I have discussed medication side effects and warnings with the patient as well. I have reviewed the plan of care with the patient, accepted their input and they are in agreement with the treatment goals. ____________________________________________________________    Problem Assessment    for treatment of   Chief Complaint   Patient presents with    Annual Wellness Visit         SUBJECTIVE    Well Adult Physical   Patient here for a comprehensive physical exam.The patient reports problems - left foot heel ulcer, right toes s/p amputation, diabetes  Do you take any herbs or supplements that were not prescribed by a doctor? no Are you taking calcium supplements? no Are you taking aspirin daily? Taking eliquis    He has left artery stent; He is taking eliquis and plavix followed by vascular surgery  Cardiovascular Review:  The patient has diabetes, hypertension, hyperlipidemia, coronary artery disease, Atrial Fibrillation and history of prior stroke. Diet and Lifestyle: generally follows a low fat low cholesterol diet, generally follows a low sodium diet, does not rigorously follow a diabetic diet, sedentary  Home BP Monitoring: is not measured at home. Pertinent ROS: taking medications as instructed, no medication side effects noted, no TIA's, no chest pain on exertion, no dyspnea on exertion, no swelling of ankles. Visit Vitals  /86 (BP 1 Location: Right arm, BP Patient Position: Sitting, BP Cuff Size: Adult)   Pulse 86   Temp 97.3 °F (36.3 °C) (Temporal)   Resp 16   Ht 5' 11\" (1.803 m)   Wt 208 lb (94.3 kg)   SpO2 100%   BMI 29.01 kg/m²     General:  Alert, cooperative, no distress, appears stated age. Head:  Normocephalic, without obvious abnormality, atraumatic. Eyes:  Conjunctivae/corneas clear. PERRL, EOMs intact.  Fundi benign   Ears:  Normal TMs and external ear canals both ears. Nose: Nares normal. Septum midline. Mucosa normal. No drainage or sinus tenderness. Throat: Lips, mucosa, and tongue normal. Teeth and gums normal.   Neck: Supple, symmetrical, trachea midline, no adenopathy, thyroid: no enlargement/tenderness/nodules, no carotid bruit and no JVD. Back:   Symmetric, no curvature. ROM normal. No CVA tenderness. Lungs:   Clear to auscultation bilaterally. Chest wall:  No tenderness or deformity. Heart:  Regular rate and rhythm, S1, S2 normal, no murmur, click, rub or gallop. Abdomen:   Soft, non-tender. Bowel sounds normal. No masses,  No organomegaly. Genitalia:  Normal male without lesion, discharge or tenderness. Rectal:  Normal tone, normal prostate, no masses or tenderness  Guaiac negative stool. Extremities: Extremities normal, atraumatic, no cyanosis or edema. Pulses: 2+ and symmetric all extremities. Skin: Skin color, texture, turgor normal. No rashes or lesions   Lymph nodes: Cervical, supraclavicular, and axillary nodes normal.   Neurologic: CNII-XII intact. Normal strength, sensation and reflexes throughout. Diabetic foot exam:     Left Foot:   Visual Exam: ulcer- 1st toe and heel   Pulse DP: 1+ (weak)   Filament test: absent sensation       Right Foot:   Visual Exam: amputation- all toes   Pulse DP: 1+ (weak)   Filament test: absent sensation     LABS     TESTS      Assessment/Plan:    1. Personal history of tobacco use, presenting hazards to health  > 30 year smoking history; screen for cancer screening  - CT LOW DOSE LUNG CANCER SCREENING; Future    2. Advance care planning  See ACP  - ADVANCE CARE PLANNING FIRST 30 MINS    3. PAF (paroxysmal atrial fibrillation) ([address]  Continue current treatment    4. Pulmonary emphysema, unspecified emphysema type ([address]  Continue inhaled therapy    5. Cardiomyopathy, unspecified type [facility])  F/u with cardiology    6.  Type 2 diabetes with nephropathy (HCC)  Goal hgb a1c <7; screening lab tests ordered  - LIPID PANEL; Future  - MICROALBUMIN, UR, RAND W/ MICROALB/CREAT RATIO; Future  -  DIABETES FOOT EXAM    7. PAD (peripheral artery disease) (HCC)  Encourage smoking cessation    8. Medicare annual wellness visit, subsequent  Reviewed preventive recommendations    9. Diabetic ulcer of left heel associated with diabetes mellitus due to underlying condition, with fat layer exposed ([facility]  Foot care; podiatry following    10. Amputation of right great toe ([facility]  Podiatry following    11. New onset of headaches after age 48  Refer for imaging  - CT HEAD WO CONT; Future    Lab review: orders written for new lab studies as appropriate; see orders      Discussed with the patient the current USPSTF guidelines released March 9, 2021 for screening for lung cancer. For adults aged 48 to [de-identified] years who have a 20 pack-year smoking history and currently smoke or have quit within the past 15 years the grade B recommendation is to:  Screen for lung cancer with low-dose computed tomography (LDCT) every year. Stop screening once a person has not smoked for 15 years or has a health problem that limits life expectancy or the ability to have lung surgery. The patient has a 45 pack-year history of cigarette smoking and currently is not smoking. Discussed with patient the risks and benefits of screening, including over-diagnosis, false positive rate, and total radiation exposure. The patient currently exhibits no signs or symptoms suggestive of lung cancer. Discussed with patient the importance of compliance with yearly annual lung cancer screenings and willingness to undergo diagnosis and treatment if screening scan is positive. In addition, the patient was counseled regarding the importance of remaining smoke free and/or total smoking cessation.     Also reviewed the following if the patient has Medicare that as of February 10, 2022, Medicare only covers LDCT screening in patients aged 51-72 with at least a 21 pack-year smoking history who currently smoke or have quit in the last 15 years

## 2022-05-27 NOTE — ACP (ADVANCE CARE PLANNING)
Advance Care Planning     Advance Care Planning (ACP) Physician/NP/PA Conversation      Date of Conversation: 5/27/2022  Conducted with: Patient with Decision Making Capacity    Healthcare Decision Maker:     Primary Decision Maker: Lear Cabot - Other Relative - [phone]    Secondary Decision Maker: Rosenda Li Sister - [phone]    Secondary Decision Maker: Rebecca Umaña (Btr N Law) - Other Relative - [phone]  Click here to complete [address] including selection of the Healthcare Decision Maker Relationship (ie \"Primary\")        Today we documented Decision Maker(s) consistent with Legal Next of Kin hierarchy. Care Preferences:    Hospitalization: \"If your health worsens and it becomes clear that your chance of recovery is unlikely, what would be your preference regarding hospitalization? \"  The patient would prefer hospitalization. Ventilation: \"If you were unable to breathe on your own and your chance of recovery was unlikely, what would be your preference about the use of a ventilator (breathing machine) if it was available to you? \"   The patient would desire the use of a ventilator. Resuscitation: \"In the event your heart stopped as a result of an underlying serious health condition, would you want attempts to be made to restart your heart, or would you prefer a natural death? \"   Yes, attempt to resuscitate.     Additional topics discussed: treatment goals, benefit/burden of treatment options, ventilation preferences, hospitalization preferences and resuscitation preferences    Conversation Outcomes / Follow-Up Plan:   ACP in process - information provided, considering goals and options  Reviewed DNR/DNI and patient elects Full Code (Attempt Resuscitation)     Length of Voluntary ACP Conversation in minutes:  16 minutes    Danni Banda MD

## 2022-05-31 ENCOUNTER — TELEPHONE (OUTPATIENT)
Dept: FAMILY MEDICINE CLINIC | Age: 67
End: 2022-05-31

## 2022-05-31 NOTE — TELEPHONE ENCOUNTER
Prince Abreu, patient's niece, called stating the [facility] is requesting patient's last OV notes along with any documentation as to why patient needs a CT scan of lung, head and chest.  Please fax to attn: Kaleigh Riggs RN @ [phone].   Please assist.

## 2022-06-02 LAB — HBA1C MFR BLD HPLC: 10.9 %

## 2022-06-14 ENCOUNTER — OFFICE VISIT (OUTPATIENT)
Dept: CARDIOLOGY CLINIC | Age: 67
End: 2022-06-14
Payer: MEDICARE

## 2022-06-14 VITALS
WEIGHT: 205 LBS | SYSTOLIC BLOOD PRESSURE: 125 MMHG | DIASTOLIC BLOOD PRESSURE: 73 MMHG | BODY MASS INDEX: 28.59 KG/M2 | OXYGEN SATURATION: 99 % | TEMPERATURE: 98.6 F | HEART RATE: 100 BPM

## 2022-06-14 DIAGNOSIS — J44.9 CHRONIC OBSTRUCTIVE PULMONARY DISEASE, UNSPECIFIED COPD TYPE (HCC): ICD-10-CM

## 2022-06-14 DIAGNOSIS — I50.9 CONGESTIVE HEART FAILURE, UNSPECIFIED HF CHRONICITY, UNSPECIFIED HEART FAILURE TYPE (HCC): Primary | ICD-10-CM

## 2022-06-14 PROCEDURE — G8428 CUR MEDS NOT DOCUMENT: HCPCS | Performed by: INTERNAL MEDICINE

## 2022-06-14 PROCEDURE — G8417 CALC BMI ABV UP PARAM F/U: HCPCS | Performed by: INTERNAL MEDICINE

## 2022-06-14 PROCEDURE — G8510 SCR DEP NEG, NO PLAN REQD: HCPCS | Performed by: INTERNAL MEDICINE

## 2022-06-14 PROCEDURE — 1123F ACP DISCUSS/DSCN MKR DOCD: CPT | Performed by: INTERNAL MEDICINE

## 2022-06-14 PROCEDURE — 99214 OFFICE O/P EST MOD 30 MIN: CPT | Performed by: INTERNAL MEDICINE

## 2022-06-14 PROCEDURE — 1101F PT FALLS ASSESS-DOCD LE1/YR: CPT | Performed by: INTERNAL MEDICINE

## 2022-06-14 PROCEDURE — G8752 SYS BP LESS 140: HCPCS | Performed by: INTERNAL MEDICINE

## 2022-06-14 PROCEDURE — 3017F COLORECTAL CA SCREEN DOC REV: CPT | Performed by: INTERNAL MEDICINE

## 2022-06-14 PROCEDURE — G8536 NO DOC ELDER MAL SCRN: HCPCS | Performed by: INTERNAL MEDICINE

## 2022-06-14 PROCEDURE — G8754 DIAS BP LESS 90: HCPCS | Performed by: INTERNAL MEDICINE

## 2022-06-14 RX ORDER — ASPIRIN 81 MG/1
81 TABLET ORAL DAILY
Qty: 90 TABLET | Refills: 3 | Status: SHIPPED | OUTPATIENT
Start: 2022-06-14

## 2022-06-14 RX ORDER — ATORVASTATIN CALCIUM 40 MG/1
TABLET, FILM COATED ORAL DAILY
COMMUNITY

## 2022-06-14 NOTE — PROGRESS NOTES
[facility]    Mr. Tate Mckinney is a 75 y.o. male with a history of coronary artery disease, status post OM1 stent in November, 2015, diabetes, hypertension, stroke, hyperlipidemia, cardiomyopathy s/p BiV ICD in 07/16    Mr. Tate Mckinney is here today for follow up appointment for his CAD and ischemic cardiomyopathy  Patient has known history of coronary disease status post OM1 stent in 11/2015. He also has history of cardiomyopathy with a EF of 35% in 2016. Because of persistent LV dysfunction despite being on appropriate medical management, he underwent placement of BiV AICD in 07/2016    Patient is here today for follow-up appointment. He has some dyspnea on exertion but no lower extremity swelling. He denies any chest pain or chest tightness  He denies any use of nitroglycerin. He is along with the niece. Using 1 pillow at night. Taking all the medication as prescribed  He is unaware of the palpitations.     Denies any bleeding problem    Past Medical History:   Diagnosis Date    Biventricular ICD (implantable cardioverter-defibrillator) in place 07/16    [employer]    CAD (coronary artery disease)     Cardiomyopathy, ischemic     EF 30% (04/16) 30-35% (11/15)    Diabetes ([address]     DVT (deep venous thrombosis) ([address] 08/16    L axillary vein after PPM insertion    HLD (hyperlipidemia)     Hypertension     NSTEMI (non-ST elevated myocardial infarction) ([address]     S/P OM1 2.5 X 23 mm XIENCE (11/15)    PAD (peripheral artery disease) ([facility])     S/P LE intervention and thrombectomy    Pulmonary emphysema ([address]     Stroke [facility])          Past Surgical History:   Procedure Laterality Date    COLONOSCOPY N/A 12/14/2017    COLONOSCOPY performed by Soniya Rucker MD at [address] [place] [address] HX PACEMAKER PLACEMENT      VASCULAR SURGERY PROCEDURE UNLIST      Stent placed right thigh       Current Outpatient Medications   Medication Sig    atorvastatin (Lipitor) 40 mg tablet Take  by mouth daily.  sacubitriL-valsartan (Entresto) 49-51 mg tab tablet TAKE 1 TABLET BY MOUTH 2 TIMES A DAY    nitroglycerin (NITROSTAT) 0.4 mg SL tablet Up to 3 doses. 0.4 mg by SubLINGual route every five (5) minutes as needed for Chest Pain. Up to 3 doses.  amiodarone (CORDARONE) 200 mg tablet Take 200 mg by mouth daily.  furosemide (LASIX) 20 mg tablet Take 20 mg by mouth two (2) times a day.  metoprolol succinate (TOPROL-XL) 25 mg XL tablet Take 25 mg by mouth daily.  spironolactone (ALDACTONE) 25 mg tablet Take 12.5 mg by mouth daily.  clopidogreL (PLAVIX) 75 mg tab Take 1 Tablet by mouth daily.  apixaban (ELIQUIS) 5 mg tablet Take 1 Tablet by mouth two (2) times a day.  acetaminophen (TylenoL) 325 mg tablet Take 650 mg by mouth every six (6) hours as needed for Pain.  multivitamin (ONE A DAY) tablet Take 1 Tablet by mouth daily.  melatonin 10 mg capsule Take  by mouth nightly.  b complex vitamins tablet Take 1 Tablet by mouth daily.  albuterol-ipratropium (DUO-NEB) 2.5 mg-0.5 mg/3 ml nebu Take 3 mL by inhalation every four (4) hours as needed.  cetirizine (ZYRTEC) 10 mg tablet Take 1 Tablet by mouth daily as needed for Allergies.  gabapentin (NEURONTIN) 300 mg capsule Take 2 Capsules by mouth three (3) times daily. Max Daily Amount: 1,800 mg.    insulin glargine (LANTUS,BASAGLAR) 100 unit/mL (3 mL) inpn Give 15 units subcutaneously daily    cholecalciferol (VITAMIN D3) (1000 Units /25 mcg) tablet Take 1 Tablet by mouth two (2) times a day.  fluticasone propionate (Flonase Allergy Relief) 50 mcg/actuation nasal spray 2 Sprays by Both Nostrils route daily.  cetaphil (CETAPHIL) topical cream Apply  to affected area as needed (dry skin).  albuterol (PROVENTIL HFA, VENTOLIN HFA, PROAIR HFA) 90 mcg/actuation inhaler Take 2 Puffs by inhalation every six (6) hours as needed for Wheezing.      No current facility-administered medications for this visit. Allergies and Sensitivities:  Allergies   Allergen Reactions    Lasix [Furosemide] Other (comments)    Levofloxacin Rash    Lisinopril Unknown (comments)    Penicillins Swelling    Pseudoephedrine Unknown (comments)       Family History:  Family History   Problem Relation Age of Onset    Heart Disease Mother     Heart Disease Father        Social History:  Social History     Tobacco Use    Smoking status: Former Smoker     Packs/day: 1.50     Years: 30.00     Pack years: 45.00     Types: Cigarettes     Quit date: 2015     Years since quittin.5    Smokeless tobacco: Never Used   Substance Use Topics    Alcohol use: No     Alcohol/week: 0.0 standard drinks    Drug use: No     He  reports that he quit smoking about 6 years ago. His smoking use included cigarettes. He has a 45.00 pack-year smoking history. He has never used smokeless tobacco.  He  reports no history of alcohol use. Review of Systems:  Cardiac symptoms as noted above in HPI. All others negative. Physical Exam:  BP Readings from Last 3 Encounters:   22 125/73   22 132/86   09/10/21 101/70         Pulse Readings from Last 3 Encounters:   22 100   22 86   09/10/21 92          Wt Readings from Last 3 Encounters:   22 93 kg (205 lb)   22 94.3 kg (208 lb)   21 94.3 kg (208 lb)       Constitutional: Oriented to person, place, and time. HENT: Head: Normocephalic and atraumatic. Neck: No JVD present. Cardiovascular: Regular rhythm. No murmur, gallop or rubs appreciated  Lung: Breath sounds normal. No respiratory distress. No ronchi or rales appreciated  Abdominal: No tenderness. No rebound and no guarding. Musculoskeletal: There is no lower extremity edema. No cynosis.   Both lower extremity are in dressing    Review of Data  LABS:   Lab Results   Component Value Date/Time    Sodium 140 2019 01:44 PM    Potassium 3.6 2019 01:44 PM    Chloride 106 01/07/2019 01:44 PM    CO2 26 01/07/2019 01:44 PM    Glucose 130 (H) 01/07/2019 01:44 PM    BUN 20 (H) 01/07/2019 01:44 PM    Creatinine 1.24 01/07/2019 01:44 PM     Lipids Latest Ref Rng & Units 5/27/2022 4/22/2021 3/9/2020 1/7/2019   Chol, Total <200 MG/ 138 169 175   HDL 40 - 60 MG/DL 49 59 71(H) 65(H)   LDL 0 - 100 MG/DL 85.8 60.8 83.2 75   Trig <150 MG/ 91 74 175(H)   Chol/HDL Ratio 0 - 5.0   3.2 2.3 2.4 2.7   Some recent data might be hidden     Lab Results   Component Value Date/Time    ALT (SGPT) 40 01/07/2019 01:44 PM     Lab Results   Component Value Date/Time    Hemoglobin A1c 6.4 (H) 04/22/2021 10:07 AM    Hemoglobin A1c (POC) 6.6 03/09/2020 02:03 PM    Hemoglobin A1c, External 10.9 06/02/2022 12:00 AM       EKG    ECHO (11/15)  Left ventricle: The ventricle was dilated. Systolic function was moderately reduced. Ejection fraction was estimated in the range of 30 %  to 35 %. There was mild diffuse hypokinesis. There was severe hypokinesis of inferior/inferolateral wall. Wall thickness was mildly increased. Doppler parameters were consistent with abnormal left ventricular relaxation (grade 1 diastolic dysfunction). Right ventricle: Systolic function was normal.  Aortic valve: There was no stenosis. CATHETERIZATION (11/15)  - LVEF estimated to be 30% with diffuse hypokinesis. No significant mitral regurgitation was noted. - LM: calcification, 20% distal   - LAD had diffuse 50% stenosis in its midportion of the vessel with only luminal irregularities in the proximal LAD. There was a  discrete 50% distal vessel stenosis, as well. The diagonal branches had mild disease.   - LCx: Circumflex had an ostial 50-60% stenosis,   - OM1: 100% thrombotic occlusion proximally. There was very faint collateral filling from the RCA. - RCA: Prox  50% diffuse, 40% distal disease. The right PDA had an 80% ostial stenosis and then a 90% mid vessel stenosis.  The right  posterolateral branch had a 90% stenosis in a sub branch. 4. PCI: 100% OM-1 stenosis reduced to 0%. drug-eluting stent, Xience 2.5 x 23 mm     IMPRESSION & PLAN:  Mr. Marylin Paul is a 75 y.o. male with cardiomyopathy, coronary artery disease, hypertension, hyperlipidemia. Coronary artery disease:  Mr. Marylin Paul had an OM1 stent in November, 2015. No angina currently  Currently on Plavix, beta-blocker, atorvastatin  As patient is already on Eliquis, I am going to discontinue Plavix and start patient on aspirin 81 mg daily. Cardiomyopathy:    His initial ejection fraction in a setting of NSTEMI was 30-35% in November 2015. A repeat EF remains 30% in April 2016, despite being on appropriate medical management. S/P Bi-V AICD placement by  in 07/16. LVEF 25% by echo in 07/2021   no ICD shock. We will have device check again  Continue  metoprolol and Entresto and Aldactone. He is taking Lasix as needed    Atrial fibrillation: This was diagnosed during routine AICD interrogation in 04/2020. Currently on metoprolol, amiodarone, apixaban    Hypertension:  BP is 125/73 mm hg. Continue same meds. Hyperlipidemia:  He is on atorvastatin 80 mg daily. Continue same    This plan was discussed with patient who is in agreement. Thank you for allowing me to participate in patient care. Please feel free to call me if you have any question or concern. Ulysses Polite, MD  Please note: This document has been produced using voice recognition software. Unrecognized errors in transcription may be present.

## 2022-06-14 NOTE — LETTER
6/14/2022    Patient: Duane Lyon   YOB: 1955   Date of Visit: 6/14/2022     Maite Montgomery MD  [address] [place] [address]  [place] [place]    Dear Maite Montgomery MD,      Thank you for referring Mr. Manny Peacock to [facility] SPECIALIST AT [facility] for evaluation. My notes for this consultation are attached. If you have questions, please do not hesitate to call me. I look forward to following your patient along with you.       Sincerely,    Thomas Sylvester MD

## 2022-06-14 NOTE — PROGRESS NOTES
Identified pt with two pt identifiers(name and ). Reviewed record in preparation for visit and have obtained necessary documentation. Sukumar Vergara presents today for   Chief Complaint   Patient presents with    Follow-up     overdue       Pt c/o DIZZINESS and  HEADACHES. Sukumar Vergara preferred language for health care discussion is english/other. Personal Protective Equipment:   Personal Protective Equipment was used including: mask-surgical and hands-gloves. Patient was placed on no precaution(s). Patient was masked. Precautions:   Patient currently on None  Patient currently roomed with door closed. Is someone accompanying this pt?  yes    Is the patient using any DME equipment during [address]? no    Depression Screening:  3 most recent PHQ Screens 2022   Little interest or pleasure in doing things Not at all   Feeling down, depressed, irritable, or hopeless Not at all   Total Score PHQ 2 0       Learning Assessment:  Learning Assessment 3/16/2017   PRIMARY LEARNER Patient   HIGHEST LEVEL OF EDUCATION - PRIMARY LEARNER  DID NOT GRADUATE HIGH SCHOOL   BARRIERS PRIMARY LEARNER NONE   CO-LEARNER CAREGIVER No   PRIMARY LANGUAGE ENGLISH   LEARNER PREFERENCE PRIMARY DEMONSTRATION   ANSWERED BY patient   RELATIONSHIP SELF       Abuse Screening:  Abuse Screening Questionnaire 9/15/2020   Do you ever feel afraid of your partner? N   Are you in a relationship with someone who physically or mentally threatens you? N   Is it safe for you to go home? Y       Fall Risk  Fall Risk Assessment, last 12 mths 2022   Able to walk? Yes   Fall in past 12 months? 0   Do you feel unsteady? 0   Are you worried about falling 0       Pt currently taking Anticoagulant therapy? no  Pt currently taking Antiplatelet therapy? No     Coordination of Care:  1. Have you been to the ER, urgent care clinic since your last visit? Hospitalized since your last visit? Yes [facility]    2.  Have you seen or consulted any other health care providers outside of the [address] [place] [address] since your last visit? Include any pap smears or [facility] screening. [facility]      Please see Red banners under Allergies and Med Rec to remove outside inquires. All correct information has been verified with patient and added to chart.      Medication's patient's would liked removed has been marked not taking to be removed per Verbal order and read back per Aminah Phillips MD

## 2022-06-14 NOTE — PATIENT INSTRUCTIONS
Testing   Limited echo- [facility]      **call office 3-5 days after testing is completed for results**     New Medication/Medication Changes    Stop Plavix    Start Aspirin 81 mg daily. **please allow 24-48 hrs for medication to be escribed to pharmacy** If you need any refills on medications please contact your pharmacy so that the request can be escribed to the provider for review.

## 2022-06-15 ENCOUNTER — TELEPHONE (OUTPATIENT)
Dept: FAMILY MEDICINE CLINIC | Age: 67
End: 2022-06-15

## 2022-06-15 NOTE — TELEPHONE ENCOUNTER
Larry Garcia from [facility] [address] [facility] called to get verbal orders to extend pts home health visits.  Please call Larry Garcia back [phone]

## 2022-06-21 ENCOUNTER — PATIENT OUTREACH (OUTPATIENT)
Dept: CASE MANAGEMENT | Age: 67
End: 2022-06-21

## 2022-06-21 NOTE — PROGRESS NOTES
Ambulatory Care Coordination    Call plce to patient. I identified myself role as LPN CC and why I was calling. Patient was seen in [facility] ED on Patient states he is doing fine. He had a follow up with cardiology on 6/14/2022. Chart reviewed. Patient declines CCM.     Katelynn Jason LPN

## 2022-06-30 ENCOUNTER — OFFICE VISIT (OUTPATIENT)
Dept: FAMILY MEDICINE CLINIC | Age: 67
End: 2022-06-30
Payer: MEDICARE

## 2022-06-30 VITALS
DIASTOLIC BLOOD PRESSURE: 69 MMHG | RESPIRATION RATE: 16 BRPM | WEIGHT: 206.6 LBS | TEMPERATURE: 97.3 F | OXYGEN SATURATION: 98 % | HEART RATE: 80 BPM | BODY MASS INDEX: 28.92 KG/M2 | SYSTOLIC BLOOD PRESSURE: 105 MMHG | HEIGHT: 71 IN

## 2022-06-30 DIAGNOSIS — I42.9 CARDIOMYOPATHY, UNSPECIFIED TYPE (HCC): ICD-10-CM

## 2022-06-30 DIAGNOSIS — R51.9 NEW ONSET OF HEADACHES AFTER AGE 50: ICD-10-CM

## 2022-06-30 DIAGNOSIS — N17.9 ACUTE RENAL FAILURE, UNSPECIFIED ACUTE RENAL FAILURE TYPE (HCC): Primary | ICD-10-CM

## 2022-06-30 DIAGNOSIS — E11.21 TYPE 2 DIABETES WITH NEPHROPATHY (HCC): ICD-10-CM

## 2022-06-30 DIAGNOSIS — I73.9 PAD (PERIPHERAL ARTERY DISEASE) (HCC): ICD-10-CM

## 2022-06-30 DIAGNOSIS — J44.1 COPD EXACERBATION (HCC): ICD-10-CM

## 2022-06-30 PROCEDURE — 3046F HEMOGLOBIN A1C LEVEL >9.0%: CPT | Performed by: FAMILY MEDICINE

## 2022-06-30 PROCEDURE — G8752 SYS BP LESS 140: HCPCS | Performed by: FAMILY MEDICINE

## 2022-06-30 PROCEDURE — 99214 OFFICE O/P EST MOD 30 MIN: CPT | Performed by: FAMILY MEDICINE

## 2022-06-30 PROCEDURE — 1123F ACP DISCUSS/DSCN MKR DOCD: CPT | Performed by: FAMILY MEDICINE

## 2022-06-30 PROCEDURE — 1101F PT FALLS ASSESS-DOCD LE1/YR: CPT | Performed by: FAMILY MEDICINE

## 2022-06-30 PROCEDURE — 2022F DILAT RTA XM EVC RTNOPTHY: CPT | Performed by: FAMILY MEDICINE

## 2022-06-30 PROCEDURE — G8510 SCR DEP NEG, NO PLAN REQD: HCPCS | Performed by: FAMILY MEDICINE

## 2022-06-30 PROCEDURE — 3017F COLORECTAL CA SCREEN DOC REV: CPT | Performed by: FAMILY MEDICINE

## 2022-06-30 PROCEDURE — G8536 NO DOC ELDER MAL SCRN: HCPCS | Performed by: FAMILY MEDICINE

## 2022-06-30 PROCEDURE — G8754 DIAS BP LESS 90: HCPCS | Performed by: FAMILY MEDICINE

## 2022-06-30 PROCEDURE — G8427 DOCREV CUR MEDS BY ELIG CLIN: HCPCS | Performed by: FAMILY MEDICINE

## 2022-06-30 PROCEDURE — G8417 CALC BMI ABV UP PARAM F/U: HCPCS | Performed by: FAMILY MEDICINE

## 2022-06-30 NOTE — PROGRESS NOTES
Martha Lu is a 75 y.o.  male and presents with    Chief Complaint   Patient presents with   [address] [place] [address] Follow Up     6/1/2022 admitted to hospital with acute renal failure  6/3/2022 discharged to home    6/11/2022 ER copd exacerbation      Subjective:  Mr. Alisa Hector presents for f/u after hospital visit for nausea and vomiting which led to renal failure. Neither the vomit nor the diarrhea contain any blood. The patient denies any abdominal pain and says that his diarrhea and nausea and vomiting resolved yesterday. He had been in with me prior to going to the ER. He received his COVID booster shot as well as a pneumonia vaccine and thereafter felt so weak that he could not stand and came to the emergency department where he was found to have a fever of 100.7. After admission, Mr. Alisa Hector, was monitored without antibiotics, provided furosemide and home medications were held for concern of hypotension. Blood cultures, COVID, Lipid panel, Hemoglobin A1c, and urine cultures were obtained. Hemoglobin A1c of 10.9 was obtained consistent with uncontrolled diabetes. COVID was negative, and at time of discharge urine and blood cultures had shown no growth. Patient reported resolution of fever and weakness. His leg wounds were evaluated by wound care and updated wound recommendations were given but no indications of infection were found on exam.      COPD Review:  The patient is being seen for follow up of COPD. He had exacerbation 3 weeks ago; he completed course of abx and prednisone and symptoms improved. Oxygen: He currently is not on home oxygen therapy. Symptoms: able walk several blocks. Patient uses 2 pillows at night. Patient does not smoke cigarettes. Cardiovascular Review:  The patient has diabetes, hypertension, hyperlipidemia, coronary artery disease, Atrial Fibrillation and history of prior stroke.   Diet and Lifestyle: generally follows a low fat low cholesterol diet, generally follows a low sodium diet, does not rigorously follow a diabetic diet, sedentary  Home BP Monitoring: is not measured at home. Pertinent ROS: taking medications as instructed, no medication side effects noted, no TIA's, no chest pain on exertion, no dyspnea on exertion, no swelling of ankles.   ROS   General ROS: negative for - chills, + fatigue, no fever  Psychological ROS: positive for depression; occasional confusion  Ophthalmic ROS: negative for - blurry vision  ENT ROS: positive for - headaches and he underwent MRI and has been referred to neurologist  Endocrine ROS: positive for - polydipsia/polyuria  Respiratory ROS: positive for - shortness of breath  Cardiovascular ROS: positive for - dyspnea on exertion  Gastrointestinal ROS: no abdominal pain, change in bowel habits, or black or bloody stools  Genito-Urinary ROS: no dysuria, trouble voiding, or hematuria; +nocturia  Musculoskeletal ROS: negative for - joint pain  Neurological ROS: positive for - numbness/tingling  Dermatological ROS: positive for - skin lesion changes    All other systems reviewed and are negative. Objective:  Vitals:    06/30/22 0829   BP: 105/69   Pulse: 80   Resp: 16   Temp: 97.3 °F (36.3 °C)   TempSrc: Temporal   SpO2: 98%   Weight: 206 lb 9.6 oz (93.7 kg)   Height: 5' 11\" (1.803 m)       alert, well appearing, and in no distress, oriented to person, place, and time and overweight  Mental status - normal mood, behavior, speech, dress, motor activity, and thought processes  Chest - clear to auscultation, no wheezes, rales or rhonchi, symmetric air entry  Heart - normal rate, regular rhythm, normal S1, S2, no murmurs, rubs, clicks or gallops  Neurological - cranial nerves II through XII intact, wide based gait  Musculoskeletal - abnormal exam of right foot s/p toe amputation    LABS     TESTS  Chest xray  IMPRESSION     No definite acute findings.  Possible mild perihilar infiltrate/edema, likely exaggerated by low lung volumes. Assessment/Plan:    1. Acute renal failure, unspecified acute renal failure type ([facility]  Assess for return to baseline  - METABOLIC PANEL, COMPREHENSIVE; Future    2. COPD exacerbation (HCC)  Complete course of abx and prednisone    3. New onset of headaches after age 47  Referred to neurology for f/u findings on MRI    4. Cardiomyopathy, unspecified type ([facility]  Continue current management    5. Type 2 diabetes with nephropathy (HCC)  Goal hgb a1c <7; continue current treatment    6. PAD (peripheral artery disease) ([facility]  F/u with vascular surgeon as scheduled      Lab review: orders written for new lab studies as appropriate; see orders      I have discussed the diagnosis with the patient and the intended plan as seen in the above orders. The patient has received an after-visit summary and questions were answered concerning future plans. I have discussed medication side effects and warnings with the patient as well. I have reviewed the plan of care with the patient, accepted their input and they are in agreement with the treatment goals.

## 2022-06-30 NOTE — PROGRESS NOTES
Cayden Laura is a 75 y.o. male (: 1955) presenting to address:    Chief Complaint   Patient presents with   [address] Follow Up       There were no vitals filed for this visit. Hearing/Vision:   No exam data present    Learning Assessment:     Learning Assessment 3/16/2017   PRIMARY LEARNER Patient   HIGHEST LEVEL OF EDUCATION - PRIMARY LEARNER  DID NOT GRADUATE HIGH SCHOOL   BARRIERS PRIMARY LEARNER NONE   CO-LEARNER CAREGIVER No   PRIMARY LANGUAGE ENGLISH   LEARNER PREFERENCE PRIMARY DEMONSTRATION   ANSWERED BY patient   RELATIONSHIP SELF     Depression Screening:     3 most recent PHQ Screens 2022   Little interest or pleasure in doing things Not at all   Feeling down, depressed, irritable, or hopeless Not at all   Total Score PHQ 2 0     Fall Risk Assessment:     Fall Risk Assessment, last 12 mths 2022   Able to walk? Yes   Fall in past 12 months? 0   Do you feel unsteady? 0   Are you worried about falling 0     Abuse Screening:     Abuse Screening Questionnaire 9/15/2020   Do you ever feel afraid of your partner? N   Are you in a relationship with someone who physically or mentally threatens you? N   Is it safe for you to go home?  Y     ADL Assessment:     ADL Assessment 10/5/2020   Feeding yourself No Help Needed   Getting from bed to chair No Help Needed   Getting dressed No Help Needed   Bathing or showering No Help Needed   Walk across the room (includes cane/walker) No Help Needed   Using the telphone No Help Needed   Taking your medications No Help Needed   Preparing meals No Help Needed   Managing money (expenses/bills) No Help Needed   Moderately strenuous housework (laundry) No Help Needed   Shopping for personal items (toiletries/medicines) No Help Needed   Shopping for groceries No Help Needed   Driving No Help Needed   Climbing a flight of stairs No Help Needed   Getting to places beyond walking distances No Help Needed        Coordination of Care Questionaire:     1. \"Have you been to the ER, urgent care clinic since your last visit? Hospitalized since your last visit? \" Yes Where: [facility]    2. \"Have you seen or consulted any other health care providers outside of the [address] [place] [address] since your last visit? \" No     3. For patients aged 39-70: Has the patient had a colonoscopy / FIT/ Cologuard? No      If the patient is female:    4. For patients aged 41-77: Has the patient had a mammogram within the past 2 years? NA - based on age or sex      11. For patients aged 21-65: Has the patient had a pap smear?  NA - based on age or sex

## 2022-07-19 ENCOUNTER — CLINICAL SUPPORT (OUTPATIENT)
Dept: CARDIOLOGY CLINIC | Age: 67
End: 2022-07-19
Payer: MEDICARE

## 2022-07-19 DIAGNOSIS — I50.9 CONGESTIVE HEART FAILURE, UNSPECIFIED HF CHRONICITY, UNSPECIFIED HEART FAILURE TYPE (HCC): ICD-10-CM

## 2022-07-19 DIAGNOSIS — Z95.810 AICD (AUTOMATIC CARDIOVERTER/DEFIBRILLATOR) PRESENT: Primary | ICD-10-CM

## 2022-07-19 PROCEDURE — 93289 INTERROG DEVICE EVAL HEART: CPT | Performed by: INTERNAL MEDICINE

## 2022-07-27 ENCOUNTER — TELEPHONE (OUTPATIENT)
Dept: CARDIOLOGY CLINIC | Age: 67
End: 2022-07-27

## 2022-08-01 ENCOUNTER — TELEPHONE (OUTPATIENT)
Dept: CARDIOLOGY CLINIC | Age: 67
End: 2022-08-01

## 2022-08-01 NOTE — TELEPHONE ENCOUNTER
----- Message from Akira Willis sent at 8/1/2022  9:21 AM EDT -----    ----- Message -----  From: Cricket Hickey MD  Sent: 8/1/2022   7:59 AM EDT  To: Akira Willis    Please call the patient regarding his abnormal result.
[facility] Two patient Identifiers confirmed. Advised pt per Dr Anne Feliciano on abnormal echo findings. Pt's EC verbalized understanding.
detailed exam

## 2022-08-03 LAB — HBA1C MFR BLD HPLC: 9.7 %

## 2022-08-05 ENCOUNTER — TELEPHONE (OUTPATIENT)
Dept: CARDIOLOGY CLINIC | Age: 67
End: 2022-08-05

## 2022-08-05 NOTE — TELEPHONE ENCOUNTER
Patient has been in the hospital for the past 3 days with blood clots. Patient states that Juan Luiseloise Cobian took him off his  blood thinner and changed him to aspirin and he says that he has been sick ever since. Patient says he would like to speak to a nurse about it.  Informed patient that once we speak to Juan Luiseloise Cobian we will give him a callback

## 2022-08-08 NOTE — TELEPHONE ENCOUNTER
Attempted to contact pt at  number, niece answered Two patient Identifiers confirmed. Advised previous issue was resolved and pt wanted to know if he was only any fluid restrictions. Advised niece I did not see any restrictions in his note for restrictions. Pts niece verbalized understanding and stated she would advise pt.

## 2022-08-25 RX ORDER — SACUBITRIL AND VALSARTAN 49; 51 MG/1; MG/1
TABLET, FILM COATED ORAL
Qty: 60 TABLET | Refills: 3 | Status: SHIPPED | OUTPATIENT
Start: 2022-08-25

## 2022-08-25 NOTE — TELEPHONE ENCOUNTER
PCP: Kash Cabral MD    Last appt: 7/19/2022  Future Appointments   Date Time Provider Deondre Antonio   11/28/2022 10:00 AM Kash Cabral MD [facility]   12/27/2022  2:00 PM Jj Moreno MD [facility] BS AMB   1/17/2023 12:00 PM CSI, PACER NORF [facility] BS AMB       Requested Prescriptions     Pending Prescriptions Disp Refills    sacubitriL-valsartan (Entresto) 49-51 mg tab tablet 60 Tablet 3     Sig: TAKE 1 TABLET BY MOUTH 2 TIMES A DAY

## 2022-09-16 ENCOUNTER — PATIENT OUTREACH (OUTPATIENT)
Dept: CASE MANAGEMENT | Age: 67
End: 2022-09-16

## 2022-09-16 NOTE — PROGRESS NOTES
.Date/Time:  9/16/2022 5:09 PM    Method of communication with patient:phone    [address] [facility] ( [facility]) made out reach to  patient by telephone to offer Complex Case Management  ( CCM). Provided introduction to self, and explanation of the Ambulatory Care . Contact at [phone] anytime Monday thru Friday 08:00-4:30.

## 2022-09-21 ENCOUNTER — PATIENT OUTREACH (OUTPATIENT)
Dept: CASE MANAGEMENT | Age: 67
End: 2022-09-21

## 2022-09-21 NOTE — PROGRESS NOTES
.Date/Time:  9/21/2022 5:29 PM    Method of communication with patient:phone    [address] ( [facility]) made second out reach to  patient by telephone to offer Complex Case Management  ( CCM). Provided introduction to self, and explanation of the Ambulatory Care . Contact at [phone] anytime Monday thru Friday 08:00-4:30.

## 2022-10-04 NOTE — PROGRESS NOTES
SHY buenrostro noted, on Eliquis. I have personally seen and evaluated the device findings. Interrogation reviewed and I agree with assessment.     Chandrakant Ellis

## 2022-10-06 ENCOUNTER — PATIENT OUTREACH (OUTPATIENT)
Dept: CASE MANAGEMENT | Age: 67
End: 2022-10-06

## 2022-10-06 PROBLEM — I25.5 ISCHEMIC CARDIOMYOPATHY: Status: ACTIVE | Noted: 2021-06-18

## 2022-10-06 PROBLEM — E44.0 MODERATE MALNUTRITION (HCC): Status: ACTIVE | Noted: 2021-06-28

## 2022-10-06 PROBLEM — M62.82 NON-TRAUMATIC RHABDOMYOLYSIS: Status: ACTIVE | Noted: 2022-10-06

## 2022-10-06 PROBLEM — E11.9 TYPE 2 DIABETES MELLITUS WITHOUT COMPLICATION (HCC): Status: ACTIVE | Noted: 2022-10-06

## 2022-10-06 PROBLEM — S91.309A NON-HEALING OPEN WOUND OF HEEL: Status: ACTIVE | Noted: 2022-08-02

## 2022-10-06 PROBLEM — E11.9 TYPE 2 DIABETES MELLITUS WITHOUT COMPLICATIONS (HCC): Status: ACTIVE | Noted: 2022-10-06

## 2022-10-06 PROBLEM — L97.929 ULCER OF LEFT LOWER EXTREMITY (HCC): Status: ACTIVE | Noted: 2022-06-02

## 2022-10-06 PROBLEM — I69.919: Status: ACTIVE | Noted: 2022-10-06

## 2022-10-06 PROBLEM — J44.9 CHRONIC OBSTRUCTIVE PULMONARY DISEASE, UNSPECIFIED (HCC): Status: ACTIVE | Noted: 2022-10-06

## 2022-10-06 PROBLEM — I73.9 PERIPHERAL ARTERIAL DISEASE (HCC): Status: ACTIVE | Noted: 2019-12-27

## 2022-10-06 PROBLEM — I48.0 PAROXYSMAL ATRIAL FIBRILLATION (HCC): Status: ACTIVE | Noted: 2022-06-02

## 2022-10-06 PROBLEM — I80.9 DEEP THROMBOPHLEBITIS: Status: ACTIVE | Noted: 2022-10-06

## 2022-10-06 PROBLEM — E11.51 TYPE 2 DIABETES MELLITUS WITH DIABETIC PERIPHERAL ANGIOPATHY WITHOUT GANGRENE, WITHOUT LONG-TERM CURRENT USE OF INSULIN (HCC): Status: ACTIVE | Noted: 2021-06-17

## 2022-10-06 PROBLEM — B35.9 TINEA: Status: ACTIVE | Noted: 2022-10-06

## 2022-10-06 PROBLEM — I25.810 CORONARY ARTERY DISEASE INVOLVING CORONARY BYPASS GRAFT OF NATIVE HEART WITHOUT ANGINA PECTORIS: Status: ACTIVE | Noted: 2021-06-17

## 2022-10-06 PROBLEM — F43.89 OTHER REACTIONS TO SEVERE STRESS: Status: ACTIVE | Noted: 2022-10-06

## 2022-10-06 PROBLEM — H90.3 SENSORINEURAL HEARING LOSS, BILATERAL: Status: ACTIVE | Noted: 2022-10-06

## 2022-10-06 PROBLEM — E78.2 MIXED HYPERLIPIDEMIA: Status: ACTIVE | Noted: 2022-10-06

## 2022-10-06 PROBLEM — I99.8 LOWER LIMB ISCHEMIA: Status: ACTIVE | Noted: 2021-06-14

## 2022-10-06 PROBLEM — I50.22 CHRONIC SYSTOLIC HEART FAILURE (HCC): Status: ACTIVE | Noted: 2021-06-17

## 2022-10-06 PROBLEM — Z11.52 ENCOUNTER FOR SCREENING FOR COVID-19: Status: ACTIVE | Noted: 2022-10-06

## 2022-10-06 PROBLEM — I50.22 CHRONIC SYSTOLIC (CONGESTIVE) HEART FAILURE (HCC): Status: ACTIVE | Noted: 2022-10-06

## 2022-10-06 PROBLEM — I10 BENIGN HYPERTENSION: Status: ACTIVE | Noted: 2022-10-06

## 2022-10-06 PROBLEM — R21 RASH: Status: ACTIVE | Noted: 2022-10-06

## 2022-10-06 PROBLEM — I48.91 UNSPECIFIED ATRIAL FIBRILLATION (HCC): Status: ACTIVE | Noted: 2022-10-06

## 2022-10-06 PROBLEM — E66.3 OVERWEIGHT: Status: ACTIVE | Noted: 2022-10-06

## 2022-10-06 PROBLEM — I10 ESSENTIAL (PRIMARY) HYPERTENSION: Status: ACTIVE | Noted: 2022-10-06

## 2022-10-06 PROBLEM — I48.91 ATRIAL FIBRILLATION (HCC): Status: ACTIVE | Noted: 2022-10-06

## 2022-10-06 NOTE — PROGRESS NOTES
.Complex Case Management      Date/Time:  10/6/2022 4:15 PM    Method of communication with patient:phone    [address] [facility] ([facility]) contacted the  Niece Johan Durantanumbrent, POA in chart   by telephone to perform Ambulatory Care Coordination. Verified name and  (PHI) with family as identifiers. Provided introduction to self, and explanation of the Ambulatory Care Manager's role. Reviewed most recent clinic visit w/ family who verbalized understanding. Family given an opportunity to ask questions. Top Challenges reviewed with the Provider   Nelson requesting a new ACP document as she is patients POA>>> AC referral sent  Nelson requesting PAP assistance for [employer] site given with directions. When completed take to cardiology office. Left heel wound healing      The family agrees to contact the PCP office or the [facility] for questions related to their healthcare. Provided contact information for future reference. Disease Specific:   N/A    Home Health Active: No    DME Active: Yes    Barriers to care? financial    Advance Care Planning:   Does patient have an Advance Directive:  reviewed and needs to be updated [facility] place referral to ACP team with this encounter     Medication(s):   Medication reconciliation was performed with family, who verbalizes understanding of administration of home medications. There were no barriers to obtaining medications identified at this time. Referral to Pharm D needed: no     Current Outpatient Medications   Medication Sig    sacubitriL-valsartan (Entresto) 49-51 mg tab tablet TAKE 1 TABLET BY MOUTH 2 TIMES A DAY    atorvastatin (LIPITOR) 40 mg tablet Take  by mouth daily. aspirin delayed-release 81 mg tablet Take 1 Tablet by mouth daily. nitroglycerin (NITROSTAT) 0.4 mg SL tablet Up to 3 doses. 0.4 mg by SubLINGual route every five (5) minutes as needed for Chest Pain. Up to 3 doses.     acetaminophen (TYLENOL) 325 mg tablet Take 650 mg by mouth every six (6) hours as needed for Pain.    multivitamin (ONE A DAY) tablet Take 1 Tablet by mouth daily. melatonin 10 mg capsule Take  by mouth nightly. b complex vitamins tablet Take 1 Tablet by mouth daily. furosemide (LASIX) 20 mg tablet Take 20 mg by mouth two (2) times a day. albuterol-ipratropium (DUO-NEB) 2.5 mg-0.5 mg/3 ml nebu Take 3 mL by inhalation every four (4) hours as needed. metoprolol succinate (TOPROL-XL) 25 mg XL tablet Take 25 mg by mouth daily. spironolactone (ALDACTONE) 25 mg tablet Take 12.5 mg by mouth daily. cetirizine (ZYRTEC) 10 mg tablet Take 1 Tablet by mouth daily as needed for Allergies. gabapentin (NEURONTIN) 300 mg capsule Take 2 Capsules by mouth three (3) times daily. Max Daily Amount: 1,800 mg.    insulin glargine (LANTUS,BASAGLAR) 100 unit/mL (3 mL) inpn Give 15 units subcutaneously daily    cholecalciferol (VITAMIN D3) (1000 Units /25 mcg) tablet Take 1 Tablet by mouth two (2) times a day. apixaban (ELIQUIS) 5 mg tablet Take 1 Tablet by mouth two (2) times a day. fluticasone propionate (FLONASE) 50 mcg/actuation nasal spray 2 Sprays by Both Nostrils route daily. cetaphil (CETAPHIL) topical cream Apply  to affected area as needed (dry skin). albuterol (PROVENTIL HFA, VENTOLIN HFA, PROAIR HFA) 90 mcg/actuation inhaler Take 2 Puffs by inhalation every six (6) hours as needed for Wheezing. amiodarone (CORDARONE) 200 mg tablet Take 200 mg by mouth daily. (Patient not taking: Reported on 10/6/2022)     No current facility-administered medications for this visit.        BS follow up appointment(s):   Future Appointments   Date Time Provider Deondre Antonio   11/28/2022 10:00 AM Brett Ritter MD [facility] BS AMB   12/27/2022  2:00 PM Roland Najera MD [facility] BS AMB   1/17/2023 12:00 PM CSI, PACER NORF [facility] BS AMB        Non-[facility] follow up appointment(s): Yes 10/10 Cardiovascular @ Emily     Goals Addressed                   This Visit's Progress       Chronic Disease     Prepare patients and caregivers for end of life decisions (ie. need for hospice, pain management, symptom relief, advance directives etc.)        Nelson Arciniega asking for new ACP documents as she is patients Dual POA  ACM made referral to Ai  team.         Diabetes     Reduce risk of comorbid complications related to diabetes (renal disease, heart disease, amputation, and retinopathy). Patient's niece will continue to manage medications  Patient will monitor blood glucose at least daily  Follow low sodium/low fat AHA/ADA diet  Check blood glucose daily and as directed  Attend all provider appointments   Podiatry 10/19  Cardiovascular 10/10  PCP 11/28  Cardiology 12/27        To decrease or maintain patient's biometrics:  HgA1c ?7 mg/dL, /80 or less. LDL of less than 100 and/or less than 70 in a patient with CAD. Patient will strive to get A1c down to 7.0 ( 10.6 @ last draw         General     Attends follow up appointments on schedule        Attend all provider appointments   Podiatry 10/19  Cardiovascular 10/10  PCP 11/28  Cardiology 12/27        Takes all medications as ordered        Catina Damon / Blanquita Hayes  will set up medications and follow through with patient         Heart Failure     Reduce risk of CHF exacerbations and complications. Patient's niece will continue to manage medications  Patient will weigh self daily, notify doctor if wt > 3 lbs in 24 hours or 5 lbs in 7 days  Patient will be able to verbalize s/s CHF and when to contact physician/go to ED  Follow low sodium/low fat AHA/ADA diet       Understand CHF action plan. Ila Moore Heart Failure Education outreach Date/Time:     ACM reviewed that a Health Healthy tips packet for the  has been . ACM reviewed CHF zones, daily weights, fluid restriction, the importance of low sodium diet, healthy tips packet with the caregiver.  Instructed caregiver to call their Cardiologist if they have a weight gain of 3 lbs in 2 days or 5 lbs in a week. Patient reminded that there is a physician on call 24 hours a day / 7 days a week should the patient have questions or concerns. The caregiver verbalized understanding.

## 2022-10-14 ENCOUNTER — PATIENT OUTREACH (OUTPATIENT)
Dept: CASE MANAGEMENT | Age: 67
End: 2022-10-14

## 2022-10-17 ENCOUNTER — PATIENT OUTREACH (OUTPATIENT)
Dept: CASE MANAGEMENT | Age: 67
End: 2022-10-17

## 2022-10-17 NOTE — PROGRESS NOTES
Social Work Note  10/14/2022      Date of referral: 10/06/2022  Referral received from: DIMA Cruz RN  Reason for referral: Assist the patient/family in the process of ACP    Previous SW Referral:  no   If yes, brief summary and outcome:  None    Attempt made to reach the patient's niece, Eric Bowman (POA). Left message with information to return call. Will follow to assist the patient as needed.

## 2022-10-17 NOTE — PROGRESS NOTES
.Complex Case Management      Date/Time:  10/17/2022 2:17 PM    Method of communication with patient:phone    [facility] ([facility]) contacted the  Watson Damon/MADALYN   by telephone to perform Ambulatory Care Coordination. Verified name and  (PHI) with Watson Damon/MADALYN  as identifiers. Provided introduction to self, and explanation of the Ambulatory Care Manager's role. Reviewed most recent clinic visit w/ Watson Damon/MADALYN  who verbalized understanding. Watson Damon/MADALYN  given an opportunity to ask questions. Top Challenges reviewed with the Provider    Has received ACP documents and will start completing them . Has not had time to start Entresto PAP yet . [facility] will start and mail to patient. The Nelson Joseluis Candelaria  agrees to contact the PCP office or the [facility] for questions related to their healthcare. Provided contact information for future reference. Disease Specific:   N/A    Home Health Active: No    DME Active: No    Barriers to care? financial    Advance Care Planning:   Does patient have an Advance Directive:  referred to a Certified Facilitator Has received documents to complete    Medication(s):   Medication reconciliation was not performed with Watson Damon/MADALYN , who verbalizes understanding of administration of home medications. There were no barriers to obtaining medications identified at this time. Referral to Pharm D needed: no     Current Outpatient Medications   Medication Sig    sacubitriL-valsartan (Entresto) 49-51 mg tab tablet TAKE 1 TABLET BY MOUTH 2 TIMES A DAY    atorvastatin (LIPITOR) 40 mg tablet Take  by mouth daily. aspirin delayed-release 81 mg tablet Take 1 Tablet by mouth daily. nitroglycerin (NITROSTAT) 0.4 mg SL tablet Up to 3 doses. 0.4 mg by SubLINGual route every five (5) minutes as needed for Chest Pain. Up to 3 doses.     acetaminophen (TYLENOL) 325 mg tablet Take 650 mg by mouth every six (6) hours as needed for Pain.    multivitamin (ONE A DAY) tablet Take 1 Tablet by mouth daily. melatonin 10 mg capsule Take  by mouth nightly. b complex vitamins tablet Take 1 Tablet by mouth daily. amiodarone (CORDARONE) 200 mg tablet Take 200 mg by mouth daily. (Patient not taking: Reported on 10/6/2022)    furosemide (LASIX) 20 mg tablet Take 20 mg by mouth two (2) times a day. albuterol-ipratropium (DUO-NEB) 2.5 mg-0.5 mg/3 ml nebu Take 3 mL by inhalation every four (4) hours as needed. metoprolol succinate (TOPROL-XL) 25 mg XL tablet Take 25 mg by mouth daily. spironolactone (ALDACTONE) 25 mg tablet Take 12.5 mg by mouth daily. cetirizine (ZYRTEC) 10 mg tablet Take 1 Tablet by mouth daily as needed for Allergies. gabapentin (NEURONTIN) 300 mg capsule Take 2 Capsules by mouth three (3) times daily. Max Daily Amount: 1,800 mg.    insulin glargine (LANTUS,BASAGLAR) 100 unit/mL (3 mL) inpn Give 15 units subcutaneously daily    cholecalciferol (VITAMIN D3) (1000 Units /25 mcg) tablet Take 1 Tablet by mouth two (2) times a day. apixaban (ELIQUIS) 5 mg tablet Take 1 Tablet by mouth two (2) times a day. fluticasone propionate (FLONASE) 50 mcg/actuation nasal spray 2 Sprays by Both Nostrils route daily. cetaphil (CETAPHIL) topical cream Apply  to affected area as needed (dry skin). albuterol (PROVENTIL HFA, VENTOLIN HFA, PROAIR HFA) 90 mcg/actuation inhaler Take 2 Puffs by inhalation every six (6) hours as needed for Wheezing. No current facility-administered medications for this visit.        [facility] follow up appointment(s):   Future Appointments   Date Time Provider Deondre Antonio   11/28/2022 10:00 AM Sanjana Sarah MD [facility] BS AMB   12/27/2022  2:00 PM Lynda Wood MD [facility] BS AMB   1/17/2023 12:00 PM CSI, PACER NORF [facility] BS AMB        Non-[facility] follow up appointment(s): NO     Goals Addressed    None

## 2022-10-19 ENCOUNTER — TELEPHONE (OUTPATIENT)
Dept: CARDIOLOGY CLINIC | Age: 67
End: 2022-10-19

## 2022-10-19 RX ORDER — SACUBITRIL AND VALSARTAN 49; 51 MG/1; MG/1
TABLET, FILM COATED ORAL
Qty: 180 TABLET | Refills: 3 | Status: CANCELLED | OUTPATIENT
Start: 2022-10-19

## 2022-10-19 NOTE — TELEPHONE ENCOUNTER
PCP: Carolin Ray MD    Last appt: 7/19/2022  Future Appointments   Date Time Provider Deondre Antonio   11/28/2022 10:00 AM Carolin Ray MD [facility] BS [facility]   12/27/2022  2:00 PM Liliana Packer MD [facility] BS AMB   1/17/2023 12:00 PM CSI, PACER NORF [facility] BS AMB       Requested Prescriptions     Pending Prescriptions Disp Refills    sacubitriL-valsartan (Entresto) 49-51 mg tab tablet 180 Tablet 3     Sig: TAKE 1 TABLET BY MOUTH 2 TIMES A DAY

## 2022-10-19 NOTE — TELEPHONE ENCOUNTER
Incoming from pt niece. Two pt identifiers confirmed. Pt's EC stated pt has 2 days left of his Entresto and the pharmacy says he cannot refill yet. Confirmed that pt is only taking 1 tab twice daily. Advised pt's EC to make sure pt is taking med as prescribed so he doesn't run out early. Refill pended to provider for signature. Pt verbalized understanding.

## 2022-10-19 NOTE — TELEPHONE ENCOUNTER
Patient answered phone sounding very irate. When I asked him what this was in regards too he said it was about his entresto. Nurse were busy with our patient at the time so I informed patient that we would have to give him a call back. This cause the patient to get more upset saying \" fuck that I need to speak to the nurse now\" I told the patient again that \"my nurse are with others patient\" this caused him to get even more upset saying \"what do you mean your nurses, they are my nurse, I need to speak with them now\" I explained to the patient that he needed to calm down and that I will put a message in and we cira give him a call back.  There was a pause and I explained this too him again and the patient hung up

## 2022-10-19 NOTE — TELEPHONE ENCOUNTER
Patient called back saying that he has refills left and to hold off of the refill that was put in this morning. Patient was trying to explain how it was too early for him to get a refill. Pharmacy informed him that he would be able to get more on 10/2.  Patient states that he cannot wait that long

## 2022-10-19 NOTE — TELEPHONE ENCOUNTER
Contacted pt sister at cell number. Two patient Identifiers confirmed. Advised that I spoke with the pharmacy and he is not due for a refill until 10/24/2022. Questioned if he has accidentally taken too many pills or if they were misplaced. She stated she was unsure. Advised unfortunately this is out of our control because its due to insurance. Gave some advice on different options to try to get PT medication . Verbally understanding. Advised to call the office for any further problems.  PT is not out of refills

## 2022-10-31 ENCOUNTER — PATIENT OUTREACH (OUTPATIENT)
Dept: CASE MANAGEMENT | Age: 67
End: 2022-10-31

## 2022-10-31 NOTE — PROGRESS NOTES
.Heart Failure Education outreach Date/Time: 10/31/2022 2:20 PM    Ambulatory Care Manager (ACM) contacted the caregiver by telephone to perform Ambulatory Care Coordination. Verified name and  with caregiver as identifiers. Provided introduction to self, and explanation of the Ambulatory Care Manager's role. ACM reviewed that a Health Healthy tips packet for the Holiday has been sent to [facility] [employer] [facility]. ACM reviewed CHF zones, daily weights, fluid restriction, the importance of low sodium diet, healthy tips packet with the caregiver. Instructed caregiver to call their Cardiologist if they have a weight gain of 3 lbs in 2 days or 5 lbs in a week. Patient reminded that there is a physician on call 24 hours a day / 7 days a week should the patient have questions or concerns. The caregiver verbalized understanding. Ambulator Care Manager [facility]) contacted the caregiver by telephone to perform Ambulatory Care Coordination. Verified name and  (PHI) with caregiver as identifiers. Provided introduction to self, and explanation of the Ambulatory Care Manager's role. Reviewed most recent clinic visit w/ caregiver who verbalized understanding. Caregiver given an opportunity to ask questions. Top Challenges reviewed with the Provider   N/A     The caregiver agrees to contact the PCP office or the [address] [place] [address] for questions related to their healthcare. Provided contact information for future reference. Disease Specific:   N/A    Home Health Active: No    DME Active: Yes    Barriers to care? none    Advance Care Planning:   Does patient have an Advance Directive:  reviewed and current     Medication(s):   Medication reconciliation was performed with caregiver, who verbalizes understanding of administration of home medications. There were no barriers to obtaining medications identified at this time.     Referral to Pharm D needed: no     Current Outpatient Medications   Medication Sig sacubitriL-valsartan (Entresto) 49-51 mg tab tablet TAKE 1 TABLET BY MOUTH 2 TIMES A DAY    atorvastatin (LIPITOR) 40 mg tablet Take  by mouth daily. aspirin delayed-release 81 mg tablet Take 1 Tablet by mouth daily. nitroglycerin (NITROSTAT) 0.4 mg SL tablet Up to 3 doses. 0.4 mg by SubLINGual route every five (5) minutes as needed for Chest Pain. Up to 3 doses. acetaminophen (TYLENOL) 325 mg tablet Take 650 mg by mouth every six (6) hours as needed for Pain.    multivitamin (ONE A DAY) tablet Take 1 Tablet by mouth daily. melatonin 10 mg capsule Take  by mouth nightly. b complex vitamins tablet Take 1 Tablet by mouth daily. amiodarone (CORDARONE) 200 mg tablet Take 200 mg by mouth daily. (Patient not taking: Reported on 10/6/2022)    furosemide (LASIX) 20 mg tablet Take 20 mg by mouth two (2) times a day. albuterol-ipratropium (DUO-NEB) 2.5 mg-0.5 mg/3 ml nebu Take 3 mL by inhalation every four (4) hours as needed. metoprolol succinate (TOPROL-XL) 25 mg XL tablet Take 25 mg by mouth daily. spironolactone (ALDACTONE) 25 mg tablet Take 12.5 mg by mouth daily. cetirizine (ZYRTEC) 10 mg tablet Take 1 Tablet by mouth daily as needed for Allergies. gabapentin (NEURONTIN) 300 mg capsule Take 2 Capsules by mouth three (3) times daily. Max Daily Amount: 1,800 mg.    insulin glargine (LANTUS,BASAGLAR) 100 unit/mL (3 mL) inpn Give 15 units subcutaneously daily    cholecalciferol (VITAMIN D3) (1000 Units /25 mcg) tablet Take 1 Tablet by mouth two (2) times a day. apixaban (ELIQUIS) 5 mg tablet Take 1 Tablet by mouth two (2) times a day. fluticasone propionate (FLONASE) 50 mcg/actuation nasal spray 2 Sprays by Both Nostrils route daily. cetaphil (CETAPHIL) topical cream Apply  to affected area as needed (dry skin). albuterol (PROVENTIL HFA, VENTOLIN HFA, PROAIR HFA) 90 mcg/actuation inhaler Take 2 Puffs by inhalation every six (6) hours as needed for Wheezing.      No current facility-administered medications for this visit. [facility] follow up appointment(s):   Future Appointments   Date Time Provider Deondre Lawsoni   11/28/2022 10:00 AM Jerman Salguero MD [facility] BS AMB   12/27/2022  2:00 PM Shyann Ca MD [facility] BS AMB   1/17/2023 12:00 PM CSI, PACER NORF [facility] BS AMB        Non-[facility] follow up appointment(s): n/a     Goals Addressed                   This Visit's Progress       Chronic Disease     Prepare patients and caregivers for end of life decisions (ie. need for hospice, pain management, symptom relief, advance directives etc.)   On track     Nialexander Castellano asking for new ACP documents as she is patients Dual POA  ACM made referral to [place] [address] team.         Diabetes     Reduce risk of comorbid complications related to diabetes (renal disease, heart disease, amputation, and retinopathy). On track     Patient's niece will continue to manage medications  Patient will monitor blood glucose at least daily  Follow low sodium/low fat AHA/ADA diet  Check blood glucose daily and as directed  Attend all provider appointments   Podiatry 10/19  Cardiovascular 10/10  PCP 11/28  Cardiology 12/27          General     Attends follow up appointments on schedule   On track     Attend all provider appointments   Podiatry 10/19  Cardiovascular 10/10  PCP 11/28  Cardiology 12/27   10/31  Patient kept f/u appointments 10/19 and 10/10       Takes all medications as ordered   On track     Tommie Damon Gray / Enrique Malone  will set up medications and follow through with patient         Heart Failure     Reduce risk of CHF exacerbations and complications. On track     Patient's niece will continue to manage medications  Patient will weigh self daily, notify doctor if wt > 3 lbs in 24 hours or 5 lbs in 7 days  Patient will be able to verbalize s/s CHF and when to contact physician/go to ED  Follow low sodium/low fat AHA/ADA diet  10/31  . CHF Protocol:   Daily weights, BP checks, monitor edema in lower legs and SOB. Limit sodium intake, avoid foods high in sodium. Monitor fluid intake as per PCP directions. Notify PCP office for wt gain 3 lbs in 2 days or 5 lbs In 1 week         Understand CHF action plan. On track     . Heart Failure Education outreach Date/Time:     ACM reviewed that a Health Healthy tips packet for the  has been . ACM reviewed CHF zones, daily weights, fluid restriction, the importance of low sodium diet, healthy tips packet with the caregiver. Instructed caregiver to call their Cardiologist if they have a weight gain of 3 lbs in 2 days or 5 lbs in a week. Patient reminded that there is a physician on call 24 hours a day / 7 days a week should the patient have questions or concerns. The caregiver verbalized understanding. 10/31  . CHF Protocol:   Daily weights, BP checks, monitor edema in lower legs and SOB. Limit sodium intake, avoid foods high in sodium. Monitor fluid intake as per PCP directions.    Notify PCP office for wt gain 3 lbs in 2 days or 5 lbs In 1 week

## 2022-11-02 ENCOUNTER — PATIENT OUTREACH (OUTPATIENT)
Dept: CASE MANAGEMENT | Age: 67
End: 2022-11-02

## 2022-11-02 NOTE — LETTER
11/2/2022 2:58 PM    Mr. Bal Certain  [address] [place] [address]              Sincerely,      Joaquina Bautista

## 2022-11-02 NOTE — PROGRESS NOTES
Initial Contact Social Work Note - Ambulatory  11/2/2022      Date of referral: 10/06/2022  Referral received from: DIMA Hernandez RN  Reason for referral: Assist the patient/family in the process of ACP    Previous SW Referral: No    If yes, brief summary and outcome:  None    Two Identifiers Verified: Yes    Insurance Provider: [facility] H[employer]    Support System:  Sibling, Niece      Pace Status: N/A    Community Providers:  PCP      ADL Assistance: N/A    Housing/Living Concerns or Home Modification Needs: None mentioned    Transportation Concern: None    Medication Cost Concern: None    Medication Education or Adherence Concern: None    Financial Concern(s): None    Income (only if applicable): N/A     Ability to Read/Write: Yes    Advance Care Plan:  Reviewed and confirmed accuracy    Other: None    Identified Needs:     ACP assistance    Social Work Plan:    Patient's niece confirms that [address] on file is current and correct (Completed)       Method of Communication with Provider (if appropriate): none     MSW contacted the patient's niece Quoc Arreaga ([address] verified) and introduced self, role and reason for call. Mrs. Rema Howard shared that the ACP has been received, however the patient already has a [address] form completed and scanned onto chart. She didn't see the need to complete AMD at this time as the information is similar to the one on file. She had no issues or concerns to report. There are no other needs identified at this time. No further follow up will be required. MSW will be available to assist with any future needs.

## 2022-11-02 NOTE — LETTER
11/2/2022 2:44 PM    Mr. Ximena Yadav  [address] [place] [address]         Dear Mr Harvey Nhung,    Please see that your niece, Debbi Fothergill gets this form and finish completing for you. It will need to be reviewed and your income verification. Once completed take to Dr Emily Calderón, cardiology office, they will complete the doctor portion and fax it back to the manufactures for approval for the [facility] free medication. It will take sometimes up to 3 weeks to hear back from them. Sincerely,    Verner Repress.  [facility], [facility]  Ambulatory Care Manager [address] [place] [address]

## 2022-11-21 ENCOUNTER — PATIENT OUTREACH (OUTPATIENT)
Dept: CASE MANAGEMENT | Age: 67
End: 2022-11-21

## 2022-11-21 RX ORDER — CLOPIDOGREL BISULFATE 75 MG/1
1 TABLET ORAL DAILY
COMMUNITY
Start: 2022-05-10

## 2022-11-21 NOTE — PROGRESS NOTES
.Complex Case Management      Date/Time:  2022 2:05 PM    Method of communication with patient:phone    [facility] ([facility]) contacted the caregiver Niece ,Karen Akhtar by telephone to perform Ambulatory Care Coordination. Verified name and  (PHI) with family as identifiers. Provided introduction to self, and explanation of the Ambulatory Care Manager's role. Reviewed most recent clinic visit w/ family who verbalized understanding. Family given an opportunity to ask questions. Top Challenges reviewed with the Provider   [facility] will be [facility] with his Entresto medication ( Cost)per nialexander Akhtar     The family agrees to contact the PCP office or the [facility] for questions related to their healthcare. Provided contact information for future reference. Disease Specific:   CHF, went over Holiday Tips with Niece again, verbalizing understanding. 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 None    Advance Care Planning:   Does patient have an Advance Directive:  reviewed and current     Medication(s):   Medication reconciliation was performed with family, who verbalizes understanding of administration of home medications. There were no barriers to obtaining medications identified at this time. Referral to Pharm D needed: no     Current Outpatient Medications   Medication Sig    clopidogreL (PLAVIX) 75 mg tab Take 1 Tablet by mouth daily. sacubitriL-valsartan (Entresto) 49-51 mg tab tablet TAKE 1 TABLET BY MOUTH 2 TIMES A DAY    atorvastatin (LIPITOR) 40 mg tablet Take  by mouth daily. aspirin delayed-release 81 mg tablet Take 1 Tablet by mouth daily. nitroglycerin (NITROSTAT) 0.4 mg SL tablet Up to 3 doses. 0.4 mg by SubLINGual route every five (5) minutes as needed for Chest Pain. Up to 3 doses.     acetaminophen (TYLENOL) 325 mg tablet Take 650 mg by mouth every six (6) hours as needed for Pain.    multivitamin (ONE A DAY) tablet Take 1 Tablet by mouth daily. melatonin 10 mg capsule Take  by mouth nightly. b complex vitamins tablet Take 1 Tablet by mouth daily. furosemide (LASIX) 20 mg tablet Take 20 mg by mouth two (2) times a day. albuterol-ipratropium (DUO-NEB) 2.5 mg-0.5 mg/3 ml nebu Take 3 mL by inhalation every four (4) hours as needed. metoprolol succinate (TOPROL-XL) 25 mg XL tablet Take 25 mg by mouth daily. spironolactone (ALDACTONE) 25 mg tablet Take 12.5 mg by mouth daily. cetirizine (ZYRTEC) 10 mg tablet Take 1 Tablet by mouth daily as needed for Allergies. gabapentin (NEURONTIN) 300 mg capsule Take 2 Capsules by mouth three (3) times daily. Max Daily Amount: 1,800 mg.    insulin glargine (LANTUS,BASAGLAR) 100 unit/mL (3 mL) inpn Give 15 units subcutaneously daily    cholecalciferol (VITAMIN D3) (1000 Units /25 mcg) tablet Take 1 Tablet by mouth two (2) times a day. apixaban (ELIQUIS) 5 mg tablet Take 1 Tablet by mouth two (2) times a day. fluticasone propionate (FLONASE) 50 mcg/actuation nasal spray 2 Sprays by Both Nostrils route daily. cetaphil (CETAPHIL) topical cream Apply  to affected area as needed (dry skin). albuterol (PROVENTIL HFA, VENTOLIN HFA, PROAIR HFA) 90 mcg/actuation inhaler Take 2 Puffs by inhalation every six (6) hours as needed for Wheezing. amiodarone (CORDARONE) 200 mg tablet Take 200 mg by mouth daily. (Patient not taking: No sig reported)     No current facility-administered medications for this visit.        [facility] follow up appointment(s):   Future Appointments   Date Time Provider Deondre Antonio   11/28/2022 10:00 AM Chaparro Scott MD [facility] BS AMB   12/27/2022  2:00 PM James Fajardo MD [facility] BS AMB   1/17/2023 12:00 PM CSI, PACER NORF [facility] BS AMB        Non-[facility] follow up appointment(s): No     Goals Addressed                   This Visit's Progress       Chronic Disease     Prepare patients and caregivers for end of life decisions (ie. need for hospice, pain management, symptom relief, advance directives etc.)   On track     Doris [employer] asking for new ACP documents as she is patients Dual POA  ACM made referral to Ai  team.  11/21/2022  ACP team has reached out         Diabetes     Reduce risk of comorbid complications related to diabetes (renal disease, heart disease, amputation, and retinopathy). On track     Patient's niece will continue to manage medications  Patient will monitor blood glucose at least daily  Follow low sodium/low fat AHA/ADA diet  Check blood glucose daily and as directed  Attend all provider appointments   Podiatry 10/19  Cardiovascular 10/10  PCP 11/28  Cardiology 12/27 11/21/22  Patient has seen podiatrist 10/19/22  Patient has seen Cardiovascular 10/10/22       To decrease or maintain patient's biometrics:  HgA1c ?7 mg/dL, /80 or less. LDL of less than 100 and/or less than 70 in a patient with CAD. On track     Patient will strive to get A1c down to 7.0 ( 10.6 @ last draw)  11/21/22  Patient doing well with medications and ADA diet  No lab pendings         General     COMPLETED: Attends follow up appointments on schedule   On track     Attend all provider appointments   Podiatry 10/19  Cardiovascular 10/10  PCP 11/28  Cardiology 12/27   10/31  Patient kept f/u appointments 10/19 and 10/10  11/21/22  Only up coming f/u with PCP 11/28 and cardiology 12/27       Takes all medications as ordered   On track     Nevinalexander, [employer] / Umer Valle  will set up medications and follow through with patient  11/21/22  Taking medications as set up by doris         Heart Failure     Reduce risk of CHF exacerbations and complications.    On track     Patient's niece will continue to manage medications  Patient will weigh self daily, notify doctor if wt > 3 lbs in 24 hours or 5 lbs in 7 days  Patient will be able to verbalize s/s CHF and when to contact physician/go to ED  Follow low sodium/low fat AHA/ADA diet  10/31  . CHF Protocol:   Daily weights, BP checks, monitor edema in lower legs and SOB. Limit sodium intake, avoid foods high in sodium. Monitor fluid intake as per PCP directions.    Notify PCP office for wt gain 3 lbs in 2 days or 5 lbs In 1 wee  11/21/22  Patient having no increase in weight,sob,or chest pains

## 2022-12-09 ENCOUNTER — PATIENT OUTREACH (OUTPATIENT)
Dept: CASE MANAGEMENT | Age: 67
End: 2022-12-09

## 2022-12-09 NOTE — PROGRESS NOTES
.Heart Failure Education outreach Date/Time: 2022 11:03 AM    Ambulatory Care Manager (ACM) contacted the caregiver , Mine La Motte Client , doris by telephone to perform Ambulatory Care Coordination. Verified name and  with caregiver as identifiers. Provided introduction to self, and explanation of the Ambulatory Care Manager's role. ACM reviewed that a Health Healthy tips packet for the Holiday has been mailed to the them. ACM reviewed CHF zones, daily weights, fluid restriction, the importance of low sodium diet, healthy tips packet with the caregiver. Instructed caregiver to call their Cardiologist if they have a weight gain of 3 lbs in 2 days or 5 lbs in a week. Patient reminded that there is a physician on call 24 hours a day / 7 days a week should the patient have questions or concerns. The caregiver verbalized understanding. .   Goals Addressed                   This Visit's Progress       Chronic Disease     COMPLETED: Prepare patients and caregivers for end of life decisions (ie. need for hospice, pain management, symptom relief, advance directives etc.)   On track     Doris [employer] asking for new ACP documents as she is patients Dual POA  ACM made referral to Ai  team.  2022  ACP team has reached out  22  Nialexander has relieved Paper work         Diabetes     Reduce risk of comorbid complications related to diabetes (renal disease, heart disease, amputation, and retinopathy).    On track     Patient's niece will continue to manage medications  Patient will monitor blood glucose at least daily  Follow low sodium/low fat AHA/ADA diet  Check blood glucose daily and as directed  Attend all provider appointments   Podiatry 10/19  Cardiovascular 10/10  PCP   Cardiology 22  Patient has seen podiatrist 10/19/22  Patient has seen Cardiovascular 10/10/22  12/9  Patient attending providers f/u appointment  Had visit today with Dr Solomon palmer orders for referral to Neurology       To decrease or maintain patient's biometrics:  HgA1c ?7 mg/dL, /80 or less. LDL of less than 100 and/or less than 70 in a patient with CAD. On track     Patient will strive to get A1c down to 7.0 ( 10.6 @ last draw)  11/21/22  Patient doing well with medications and ADA diet  No lab pending  12/9/22  Attended Podiatry Dr Presley Robb no labs           General     Takes all medications as ordered   On track     Melonie Damon / Jared Canchola  will set up medications and follow through with patient  11/21/22  Taking medications as set up by doris  12/9  Patient continues to take medications as prepared by Doris         Heart Failure     Reduce risk of CHF exacerbations and complications. On track     Patient's niece will continue to manage medications  Patient will weigh self daily, notify doctor if wt > 3 lbs in 24 hours or 5 lbs in 7 days  Patient will be able to verbalize s/s CHF and when to contact physician/go to ED  Follow low sodium/low fat AHA/ADA diet  10/31  . CHF Protocol:   Daily weights, BP checks, monitor edema in lower legs and SOB. Limit sodium intake, avoid foods high in sodium. Monitor fluid intake as per PCP directions. Notify PCP office for wt gain 3 lbs in 2 days or 5 lbs In 1 wee  11/21/22  Patient having no increase in weight,sob,or chest pains  12/9  Winter holiday tips discussed. Letter to be mailed  before Xmas         Understand CHF action plan. On track     . Heart Failure Education outreach Date/Time:     ACM reviewed that a Health Healthy tips packet for the  has been . ACM reviewed CHF zones, daily weights, fluid restriction, the importance of low sodium diet, healthy tips packet with the caregiver. Instructed caregiver to call their Cardiologist if they have a weight gain of 3 lbs in 2 days or 5 lbs in a week.      Patient reminded that there is a physician on call 24 hours a day / 7 days a week should the patient have questions or concerns. The caregiver verbalized understanding. 10/31  . CHF Protocol:   Daily weights, BP checks, monitor edema in lower legs and SOB. Limit sodium intake, avoid foods high in sodium. Monitor fluid intake as per PCP directions. Notify PCP office for wt gain 3 lbs in 2 days or 5 lbs In 1 week  12/9  Winter Holiday tips discuss Letter to be mailed              Patient was seen today by Dr Bhupinder Walter referral place to neurology for neuropathic unrelieved pain . Maybe due to other sources.

## 2022-12-27 ENCOUNTER — OFFICE VISIT (OUTPATIENT)
Dept: CARDIOLOGY CLINIC | Age: 67
End: 2022-12-27

## 2023-01-03 RX ORDER — SACUBITRIL AND VALSARTAN 49; 51 MG/1; MG/1
TABLET, FILM COATED ORAL
Qty: 60 TABLET | Refills: 2 | Status: SHIPPED | OUTPATIENT
Start: 2023-01-03

## 2023-01-03 NOTE — TELEPHONE ENCOUNTER
PCP: Kleli Alexis MD    Last appt: 7/19/2022  Future Appointments   Date Time Provider Deondre Antonio   1/17/2023 12:00 PM CSI, PACER NORF [facility] BS AMB   2/9/2023  8:45 AM Marly Peterson MD [facility] BS AMB       Requested Prescriptions     Pending Prescriptions Disp Refills    sacubitriL-valsartan (Entresto) 49-51 mg tab tablet 60 Tablet 3     Sig: TAKE 1 TABLET BY MOUTH 2 TIMES A DAY

## 2023-01-03 NOTE — TELEPHONE ENCOUNTER
Requested Prescriptions     Pending Prescriptions Disp Refills    sacubitriL-valsartan (Entresto) 49-51 mg tab tablet 60 Tablet 3     Sig: TAKE 1 TABLET BY MOUTH 2 TIMES A DAY        Patient very frustrated.  States that \"Sudarshan knows about his weak heart and that if anything happens to him his people will take him to court\"

## 2023-01-05 ENCOUNTER — PATIENT OUTREACH (OUTPATIENT)
Dept: CASE MANAGEMENT | Age: 68
End: 2023-01-05

## 2023-01-05 NOTE — PROGRESS NOTES
.Complex Case Management      Date/Time:  2023 1:13 PM    Method of communication with patient:phone    [facility] ([facility]) contacted the caregiver ,neice Mrs Cesario Riedel by telephone to perform Ambulatory Care Coordination. Verified name and  (PHI) with caregiver as identifiers. Provided introduction to self, and explanation of the Ambulatory Care Manager's role. Reviewed most recent clinic visit w/ caregiver who verbalized understanding. Caregiver given an opportunity to ask questions. Top Challenges reviewed with the Provider   N/A     The caregiver agrees to contact the PCP office or the [facility] for questions related to their healthcare. Provided contact information for future reference. Disease Specific:   N/A    Home Health Active: No    DME Active: Yes    Barriers to care? None    Advance Care Planning:   Does patient have an Advance Directive:  reviewed and current     Medication(s):   Medication reconciliation was performed with caregiver, who verbalizes understanding of administration of home medications. There were no barriers to obtaining medications identified at this time. Referral to Pharm D needed: no     Current Outpatient Medications   Medication Sig    sacubitriL-valsartan (Entresto) 49-51 mg tab tablet TAKE 1 TABLET BY MOUTH 2 TIMES A DAY    clopidogreL (PLAVIX) 75 mg tab Take 1 Tablet by mouth daily. atorvastatin (LIPITOR) 40 mg tablet Take  by mouth daily. aspirin delayed-release 81 mg tablet Take 1 Tablet by mouth daily. nitroglycerin (NITROSTAT) 0.4 mg SL tablet Up to 3 doses. 0.4 mg by SubLINGual route every five (5) minutes as needed for Chest Pain. Up to 3 doses. acetaminophen (TYLENOL) 325 mg tablet Take 650 mg by mouth every six (6) hours as needed for Pain.    multivitamin (ONE A DAY) tablet Take 1 Tablet by mouth daily. melatonin 10 mg capsule Take  by mouth nightly. b complex vitamins tablet Take 1 Tablet by mouth daily. furosemide (LASIX) 20 mg tablet Take 20 mg by mouth two (2) times a day. albuterol-ipratropium (DUO-NEB) 2.5 mg-0.5 mg/3 ml nebu Take 3 mL by inhalation every four (4) hours as needed. metoprolol succinate (TOPROL-XL) 25 mg XL tablet Take 25 mg by mouth daily. spironolactone (ALDACTONE) 25 mg tablet Take 12.5 mg by mouth daily. cetirizine (ZYRTEC) 10 mg tablet Take 1 Tablet by mouth daily as needed for Allergies. gabapentin (NEURONTIN) 300 mg capsule Take 2 Capsules by mouth three (3) times daily. Max Daily Amount: 1,800 mg.    insulin glargine (LANTUS,BASAGLAR) 100 unit/mL (3 mL) inpn Give 15 units subcutaneously daily    cholecalciferol (VITAMIN D3) (1000 Units /25 mcg) tablet Take 1 Tablet by mouth two (2) times a day. apixaban (ELIQUIS) 5 mg tablet Take 1 Tablet by mouth two (2) times a day. fluticasone propionate (FLONASE) 50 mcg/actuation nasal spray 2 Sprays by Both Nostrils route daily. cetaphil (CETAPHIL) topical cream Apply  to affected area as needed (dry skin). albuterol (PROVENTIL HFA, VENTOLIN HFA, PROAIR HFA) 90 mcg/actuation inhaler Take 2 Puffs by inhalation every six (6) hours as needed for Wheezing. amiodarone (CORDARONE) 200 mg tablet Take 200 mg by mouth daily. (Patient not taking: No sig reported)     No current facility-administered medications for this visit. [facility] follow up appointment(s):   Future Appointments   Date Time Provider Deondre Antonio   1/17/2023 12:00 PM CSI, PACER NORF [facility] BS AMB   2/9/2023  8:45 AM Marga Pittman MD [facility] BS AMB        Non-[facility] follow up appointment(s): No     Goals Addressed                   This Visit's Progress       Diabetes     Reduce risk of comorbid complications related to diabetes (renal disease, heart disease, amputation, and retinopathy).    On track     Patient's niece will continue to manage medications  Patient will monitor blood glucose at least daily  Follow low sodium/low fat AHA/ADA diet  Check blood glucose daily and as directed  Attend all provider appointments   Podiatry 10/19  Cardiovascular 10/10  PCP 11/28  Cardiology 12/27 11/21/22  Patient has seen podiatrist 10/19/22  Patient has seen Cardiovascular 10/10/22  12/9  Patient attending providers f/u appointment  Had visit today with Dr Sharif Enciso new orders for referral to Neurology       To decrease or maintain patient's biometrics:  HgA1c ?7 mg/dL, /80 or less. LDL of less than 100 and/or less than 70 in a patient with CAD. On track     Patient will strive to get A1c down to 7.0 ( 10.6 @ last draw)  11/21/22  Patient doing well with medications and ADA diet  No lab pending  12/9/22  Attended Podiatry Dr Kia Real no labs           General     Takes all medications as ordered   On track     Connie Damon / Stacie Hills  will set up medications and follow through with patient  11/21/22  Taking medications as set up by doris  12/9  Patient continues to take medications as prepared by Doris         Heart Failure     Understand CHF action plan. On track     . Heart Failure Education outreach Date/Time:     ACM reviewed that a Health Healthy tips packet for the  has been . ACM reviewed CHF zones, daily weights, fluid restriction, the importance of low sodium diet, healthy tips packet with the caregiver. Instructed caregiver to call their Cardiologist if they have a weight gain of 3 lbs in 2 days or 5 lbs in a week. Patient reminded that there is a physician on call 24 hours a day / 7 days a week should the patient have questions or concerns. The caregiver verbalized understanding. 10/31  . CHF Protocol:   Daily weights, BP checks, monitor edema in lower legs and SOB. Limit sodium intake, avoid foods high in sodium. Monitor fluid intake as per PCP directions.    Notify PCP office for wt gain 3 lbs in 2 days or 5 lbs In 1 week  12/9  Winter Holiday tips discuss Letter to be mailed

## 2023-01-17 ENCOUNTER — CLINICAL SUPPORT (OUTPATIENT)
Dept: CARDIOLOGY CLINIC | Age: 68
End: 2023-01-17
Payer: MEDICARE

## 2023-01-17 DIAGNOSIS — I25.5 ISCHEMIC CARDIOMYOPATHY: ICD-10-CM

## 2023-01-17 DIAGNOSIS — Z95.810 AICD (AUTOMATIC CARDIOVERTER/DEFIBRILLATOR) PRESENT: Primary | ICD-10-CM

## 2023-01-26 PROCEDURE — 93289 INTERROG DEVICE EVAL HEART: CPT | Performed by: INTERNAL MEDICINE

## 2023-01-30 ENCOUNTER — PATIENT OUTREACH (OUTPATIENT)
Dept: CASE MANAGEMENT | Age: 68
End: 2023-01-30

## 2023-01-30 NOTE — PROGRESS NOTES
.Date/Time:  1/30/2023 1:37 PM    Method of communication with patient:phone    [facility] ( [facility]) made out reach to  patient/niece Jess BERGMAN  by telephone for Complex Case Management  ( CCM) follow up . ACM noted ED visit 1/28 due to increase nasal drip but did not wait to have COVID testing Considered Elopement per [facility] notes. Provided introduction to self, and explanation of the Ambulatory Care . Contact at [phone] anytime Monday thru Friday 08:00-4:30.

## 2023-01-31 ENCOUNTER — TELEPHONE (OUTPATIENT)
Dept: CARDIOLOGY CLINIC | Age: 68
End: 2023-01-31

## 2023-01-31 NOTE — TELEPHONE ENCOUNTER
Is there any alterative to entresto patient states that it has been difficult getting it from the pharmacy

## 2023-01-31 NOTE — TELEPHONE ENCOUNTER
Verbal order with read back Luis Simmons [facility], [address] if mediation is not available at pharmacy or if financial issue.  If issue is not resolved vasartan 40 mg daily

## 2023-01-31 NOTE — TELEPHONE ENCOUNTER
Contacted pt at. . Two patient Identifiers confirmed. Advised pt per Dr Fifi Rao. Per pts caregiver pharmacy stated she did not have mediatation in stock today but it would be in stock tomorrow. Advied to call tomorrow if medication was not available. Pts caregiver verbalized understanding.

## 2023-02-21 ENCOUNTER — PATIENT OUTREACH (OUTPATIENT)
Dept: CASE MANAGEMENT | Age: 68
End: 2023-02-21

## 2023-02-21 NOTE — PROGRESS NOTES
.Ambulatory Care Management Note    Date/Time:  2/21/2023 8:46 AM    Patient Current Location: [place]    Patient has graduated from the Complex Case Management  program on 2/21/23. Patient/family has the ability to self-manage at this time. Care management goals have been completed. No further Ambulatory Care Manager follow up scheduled. Goals Addressed                   This Visit's Progress       Diabetes     COMPLETED: Reduce risk of comorbid complications related to diabetes (renal disease, heart disease, amputation, and retinopathy). Patient's niece will continue to manage medications  Patient will monitor blood glucose at least daily  Follow low sodium/low fat AHA/ADA diet  Check blood glucose daily and as directed  Attend all provider appointments   Podiatry 10/19  Cardiovascular 10/10  PCP 11/28  Cardiology 12/27 11/21/22  Patient has seen podiatrist 10/19/22  Patient has seen Cardiovascular 10/10/22  12/9  Patient attending providers f/u appointment  Had visit today with Dr Valadez March new orders for referral to Neurology       COMPLETED: To decrease or maintain patient's biometrics:  HgA1c ?7 mg/dL, /80 or less. LDL of less than 100 and/or less than 70 in a patient with CAD. Patient will strive to get A1c down to 7.0 ( 10.6 @ last draw)  11/21/22  Patient doing well with medications and ADA diet  No lab pending  12/9/22  Attended Podiatry Dr Sarah Trevino no labs           Heart Failure     COMPLETED: Understand CHF action plan. Nancy Last Heart Failure Education outreach Date/Time:     ACM reviewed that a Health Healthy tips packet for the  has been . ACM reviewed CHF zones, daily weights, fluid restriction, the importance of low sodium diet, healthy tips packet with the caregiver. Instructed caregiver to call their Cardiologist if they have a weight gain of 3 lbs in 2 days or 5 lbs in a week.      Patient reminded that there is a physician on call 24 hours a day / 7 days a week should the patient have questions or concerns. The caregiver verbalized understanding. 10/31  . CHF Protocol:   Daily weights, BP checks, monitor edema in lower legs and SOB. Limit sodium intake, avoid foods high in sodium. Monitor fluid intake as per PCP directions. Notify PCP office for wt gain 3 lbs in 2 days or 5 lbs In 1 week  12/9  Winter Holiday tips discuss Letter to be mailed                  Patient has Ambulatory Care Manager's contact information for any further questions, concerns, or needs. Patients upcoming visits:  No future appointments.

## 2023-03-20 ENCOUNTER — CARE COORDINATION (OUTPATIENT)
Facility: CLINIC | Age: 68
End: 2023-03-20

## 2023-03-20 NOTE — CARE COORDINATION
.Patient has graduated from the Complex Care program on 3/20/23. Patient/family has the ability to self-manage at this time. Care management goals have been completed. No further Ambulatory Care Manager follow up scheduled. Goals Addressed    None         Patient has Ambulatory Care Manager's contact information for any further questions, concerns, or needs.   Patients upcoming visits:    Future Appointments   Date Time Provider Claudia Taylor   3/30/2023  9:00 AM Lamont Angelo MD [facility] BS AMB   5/16/2023  9:00 AM CSI, PACER HV [facility] BS AMB

## 2023-03-27 NOTE — TELEPHONE ENCOUNTER
PCP: Erica Mcgee MD    Last appt: [unfilled]  Future Appointments   Date Time Provider Claudia Taylor   3/30/2023  9:00 AM Lamont Mosley MD [facility] BS AMB   5/16/2023  9:00 AM CSI, PACER HV [facility] BS AMB       Requested Prescriptions     Pending Prescriptions Disp Refills    ENTRESTO 49-51 MG per tablet [Pharmacy Med Name: ENTRESTO 49 MG-51 MG TABLET] 60 tablet 3     Sig: take 1 tablet by mouth twice a day

## 2023-03-28 RX ORDER — SACUBITRIL AND VALSARTAN 49; 51 MG/1; MG/1
TABLET, FILM COATED ORAL
Qty: 60 TABLET | Refills: 3 | Status: SHIPPED | OUTPATIENT
Start: 2023-03-28 | End: 2023-03-30 | Stop reason: SDUPTHER

## 2023-03-30 ENCOUNTER — OFFICE VISIT (OUTPATIENT)
Age: 68
End: 2023-03-30
Payer: COMMERCIAL

## 2023-03-30 VITALS
BODY MASS INDEX: 28.59 KG/M2 | OXYGEN SATURATION: 98 % | DIASTOLIC BLOOD PRESSURE: 70 MMHG | SYSTOLIC BLOOD PRESSURE: 112 MMHG | HEART RATE: 80 BPM | WEIGHT: 205 LBS

## 2023-03-30 DIAGNOSIS — I48.0 PAF (PAROXYSMAL ATRIAL FIBRILLATION) (HCC): ICD-10-CM

## 2023-03-30 DIAGNOSIS — E78.00 PURE HYPERCHOLESTEROLEMIA: ICD-10-CM

## 2023-03-30 DIAGNOSIS — I10 ESSENTIAL HYPERTENSION WITH GOAL BLOOD PRESSURE LESS THAN 140/90: ICD-10-CM

## 2023-03-30 DIAGNOSIS — I25.5 ISCHEMIC CARDIOMYOPATHY: ICD-10-CM

## 2023-03-30 DIAGNOSIS — I25.83 CORONARY ARTERY DISEASE DUE TO LIPID RICH PLAQUE: Primary | ICD-10-CM

## 2023-03-30 DIAGNOSIS — I25.10 CORONARY ARTERY DISEASE DUE TO LIPID RICH PLAQUE: Primary | ICD-10-CM

## 2023-03-30 PROCEDURE — 3078F DIAST BP <80 MM HG: CPT | Performed by: INTERNAL MEDICINE

## 2023-03-30 PROCEDURE — 99214 OFFICE O/P EST MOD 30 MIN: CPT | Performed by: INTERNAL MEDICINE

## 2023-03-30 PROCEDURE — 3074F SYST BP LT 130 MM HG: CPT | Performed by: INTERNAL MEDICINE

## 2023-03-30 PROCEDURE — 1123F ACP DISCUSS/DSCN MKR DOCD: CPT | Performed by: INTERNAL MEDICINE

## 2023-03-30 RX ORDER — PREGABALIN 75 MG/1
75 CAPSULE ORAL 2 TIMES DAILY
COMMUNITY

## 2023-03-30 RX ORDER — SACUBITRIL AND VALSARTAN 49; 51 MG/1; MG/1
1 TABLET, FILM COATED ORAL 2 TIMES DAILY
Qty: 60 TABLET | Refills: 5 | Status: SHIPPED | OUTPATIENT
Start: 2023-03-30

## 2023-03-30 ASSESSMENT — PATIENT HEALTH QUESTIONNAIRE - PHQ9
SUM OF ALL RESPONSES TO PHQ9 QUESTIONS 1 & 2: 0
SUM OF ALL RESPONSES TO PHQ QUESTIONS 1-9: 0
2. FEELING DOWN, DEPRESSED OR HOPELESS: 0
SUM OF ALL RESPONSES TO PHQ QUESTIONS 1-9: 0
1. LITTLE INTEREST OR PLEASURE IN DOING THINGS: 0

## 2023-03-30 NOTE — PATIENT INSTRUCTIONS
New Location Address- projected for the month of May 2023    [address] [facility] [address], [place] [address]

## 2023-03-30 NOTE — PROGRESS NOTES
Identified pt with two pt identifiers(name and ). Reviewed record in preparation for visit and have obtained necessary documentation. Kaila Meo presents today for   Chief Complaint   Patient presents with    Follow-up       Pt denies  DIZZINESS, SOB, CHEST PAIN/ PRESSURE, FATIGUE/WEAKNESS, HEADACHES, SWELLING. [facility] preferred language for health care discussion is english/other. Personal Protective Equipment:   Personal Protective Equipment was used including: mask-surgical and hands-gloves. Patient was placed on no precaution(s). Patient was masked. Precautions:   Patient currently on None  Patient currently roomed with door closed. Is someone accompanying this pt? yes    Is the patient using any DME equipment during OV? No     Depression Screening:  completed    Abuse Screening:  completed    Fall Risk  completed    Pt currently taking Anticoagulant therapy? yes  Pt currently taking Antiplatelet therapy? yes    Coordination of Care:  1. Have you been to the ER, urgent care clinic since your last visit? Hospitalized since your last visit? Yes. 3/28/23. 2. Have you seen or consulted any other health care providers outside of the [address] [place] [address] since your last visit? Include any pap smears or colon screening.  No
negative. Current Outpatient Medications   Medication Sig    ENTRESTO 49-51 MG per tablet take 1 tablet by mouth twice a day    acetaminophen (TYLENOL) 325 MG tablet Take 650 mg by mouth every 6 hours as needed    albuterol sulfate HFA (PROVENTIL;VENTOLIN;PROAIR) 108 (90 Base) MCG/ACT inhaler Inhale 2 puffs into the lungs every 6 hours as needed    amiodarone (CORDARONE) 200 MG tablet Take 200 mg by mouth daily    apixaban (ELIQUIS) 5 MG TABS tablet Take 5 mg by mouth 2 times daily    aspirin 81 MG EC tablet Take 81 mg by mouth daily    atorvastatin (LIPITOR) 40 MG tablet Take by mouth daily    cetirizine (ZYRTEC) 10 MG tablet Take 10 mg by mouth daily as needed    vitamin D (CHOLECALCIFEROL) 25 MCG (1000 UT) TABS tablet Take 1,000 Units by mouth 2 times daily    clopidogrel (PLAVIX) 75 MG tablet Take 1 tablet by mouth daily    fluticasone (FLONASE) 50 MCG/ACT nasal spray 2 sprays by Nasal route daily    furosemide (LASIX) 20 MG tablet Take 20 mg by mouth 2 times daily    gabapentin (NEURONTIN) 300 MG capsule Take 600 mg by mouth 3 times daily. insulin glargine (LANTUS SOLOSTAR) 100 UNIT/ML injection pen Give 15 units subcutaneously daily    ipratropium-albuterol (DUONEB) 0.5-2.5 (3) MG/3ML SOLN nebulizer solution Inhale 3 mLs into the lungs every 4 hours as needed    melatonin 10 MG CAPS capsule Take by mouth    metoprolol succinate (TOPROL XL) 25 MG extended release tablet Take 25 mg by mouth daily    nitroGLYCERIN (NITROSTAT) 0.4 MG SL tablet Up to 3 doses. 0.4 mg by SubLINGual route every five (5) minutes as needed for Chest Pain. Up to 3 doses. spironolactone (ALDACTONE) 25 MG tablet Take 12.5 mg by mouth daily     No current facility-administered medications for this visit.        Past Surgical History:   Procedure Laterality Date    COLONOSCOPY N/A 12/14/2017    COLONOSCOPY performed by Humaira Myrick MD at [facility]      Stent placed right thigh

## 2023-05-16 ENCOUNTER — PROCEDURE VISIT (OUTPATIENT)
Age: 68
End: 2023-05-16
Payer: COMMERCIAL

## 2023-05-16 DIAGNOSIS — Z95.810 PRESENCE OF AUTOMATIC (IMPLANTABLE) CARDIAC DEFIBRILLATOR: Primary | ICD-10-CM

## 2023-05-16 DIAGNOSIS — I50.9 HEART FAILURE, UNSPECIFIED (HCC): ICD-10-CM

## 2023-05-16 PROCEDURE — 93284 PRGRMG EVAL IMPLANTABLE DFB: CPT | Performed by: INTERNAL MEDICINE

## 2023-09-12 ENCOUNTER — OFFICE VISIT (OUTPATIENT)
Age: 68
End: 2023-09-12
Payer: COMMERCIAL

## 2023-09-12 VITALS
SYSTOLIC BLOOD PRESSURE: 116 MMHG | BODY MASS INDEX: 29.01 KG/M2 | OXYGEN SATURATION: 92 % | WEIGHT: 208 LBS | HEART RATE: 67 BPM | DIASTOLIC BLOOD PRESSURE: 76 MMHG

## 2023-09-12 DIAGNOSIS — I25.83 CORONARY ARTERY DISEASE DUE TO LIPID RICH PLAQUE: Primary | ICD-10-CM

## 2023-09-12 DIAGNOSIS — I10 ESSENTIAL HYPERTENSION WITH GOAL BLOOD PRESSURE LESS THAN 140/90: ICD-10-CM

## 2023-09-12 DIAGNOSIS — E78.00 PURE HYPERCHOLESTEROLEMIA: ICD-10-CM

## 2023-09-12 DIAGNOSIS — I25.10 CORONARY ARTERY DISEASE DUE TO LIPID RICH PLAQUE: Primary | ICD-10-CM

## 2023-09-12 PROCEDURE — 1123F ACP DISCUSS/DSCN MKR DOCD: CPT | Performed by: INTERNAL MEDICINE

## 2023-09-12 PROCEDURE — 99214 OFFICE O/P EST MOD 30 MIN: CPT | Performed by: INTERNAL MEDICINE

## 2023-09-12 PROCEDURE — 3078F DIAST BP <80 MM HG: CPT | Performed by: INTERNAL MEDICINE

## 2023-09-12 PROCEDURE — 3074F SYST BP LT 130 MM HG: CPT | Performed by: INTERNAL MEDICINE

## 2023-09-12 RX ORDER — SPIRONOLACTONE 25 MG/1
12.5 TABLET ORAL DAILY
Qty: 30 TABLET | Refills: 3 | Status: SHIPPED | OUTPATIENT
Start: 2023-09-12

## 2023-09-12 NOTE — PROGRESS NOTES
Identified pt with two pt identifiers(name and ). Reviewed record in preparation for visit and have obtained necessary documentation. Keshawn White presents today for   Chief Complaint   Patient presents with    Follow-up       Pt c/o  SWELLING. Keshawn White preferred language for health care discussion is english/other. Personal Protective Equipment:   Personal Protective Equipment was used including: mask-surgical and hands-gloves. Patient was placed on no precaution(s). Patient was masked. Precautions:   Patient currently on None  Patient currently roomed with door closed. Is someone accompanying this pt? friend    Is the patient using any DME equipment during [address]? cane    Depression Screening:      3/30/2023     9:04 AM 2022     8:24 AM 2022     2:15 PM 2022    10:21 AM 9/10/2021     1:07 PM 9/10/2021    12:57 PM 2021     4:24 PM   PHQ-9 Questionaire   Little interest or pleasure in doing things 0 0 0 0 0 0 0   Feeling down, depressed, or hopeless 0 0 0 0  0 0   PHQ-9 Total Score 0 0 0 0 0 0 0        Learning Assessment:  No question data found. Abuse Screenin/12/2023     9:00 AM 3/30/2023     8:00 AM   AMB Abuse Screening   Do you ever feel afraid of your partner? N N   Are you in a relationship with someone who physically or mentally threatens you? N N   Is it safe for you to go home? Willie Jaeger          Fall Risk      2023     9:50 AM 3/30/2023     9:04 AM   Fall Risk   2 or more falls in past year? no no   Fall with injury in past year? no no         Pt currently taking Anticoagulant therapy? Eliquis 5  Pt currently taking Antiplatelet therapy? Aspirin 81    Coordination of Care:  1. Have you been to the ER, urgent care clinic since your last visit? Yes Hospitalized since your last visit? no    2. Have you seen or consulted any other health care providers outside of the [address] [place] [address] since your last visit?  Include any pap smears
atorvastatin (LIPITOR) 40 MG tablet Take by mouth daily    furosemide (LASIX) 20 MG tablet Take 1 tablet by mouth 2 times daily    metoprolol succinate (TOPROL XL) 25 MG extended release tablet Take 1 tablet by mouth daily    nitroGLYCERIN (NITROSTAT) 0.4 MG SL tablet Up to 3 doses. 0.4 mg by SubLINGual route every five (5) minutes as needed for Chest Pain. Up to 3 doses. pregabalin (LYRICA) 75 MG capsule Take 75 mg by mouth 2 times daily. sacubitril-valsartan (ENTRESTO) 49-51 MG per tablet Take 1 tablet by mouth 2 times daily    acetaminophen (TYLENOL) 325 MG tablet Take 650 mg by mouth every 6 hours as needed    albuterol sulfate HFA (PROVENTIL;VENTOLIN;PROAIR) 108 (90 Base) MCG/ACT inhaler Inhale 2 puffs into the lungs every 6 hours as needed    amiodarone (CORDARONE) 200 MG tablet Take 200 mg by mouth daily    cetirizine (ZYRTEC) 10 MG tablet Take 10 mg by mouth daily as needed    vitamin D (CHOLECALCIFEROL) 25 MCG (1000 UT) TABS tablet Take 1,000 Units by mouth 2 times daily    fluticasone (FLONASE) 50 MCG/ACT nasal spray 2 sprays by Nasal route daily    insulin glargine (LANTUS SOLOSTAR) 100 UNIT/ML injection pen Give 15 units subcutaneously daily    ipratropium-albuterol (DUONEB) 0.5-2.5 (3) MG/3ML SOLN nebulizer solution Inhale 3 mLs into the lungs every 4 hours as needed    spironolactone (ALDACTONE) 25 MG tablet Take 12.5 mg by mouth daily     No current facility-administered medications for this visit.        Past Surgical History:   Procedure Laterality Date    COLONOSCOPY N/A 12/14/2017    COLONOSCOPY performed by Luis Phan MD at [address] [facility] [address]      Stent placed right thigh       Allergies and Sensitivities:  Allergies   Allergen Reactions    Furosemide Other (See Comments)    Lisinopril      Other reaction(s): Unknown (comments)    Penicillins Swelling    Pseudoephedrine      Other reaction(s): Unknown (comments)    Levofloxacin Rash       Family

## 2023-09-12 NOTE — PATIENT INSTRUCTIONS
New Medication/Medication Changes      **please allow 24-48 hrs for medication to be escribed to pharmacy** If you need any refills on medications please contact your pharmacy so that the request can be escribed to the provider for review seven to 10 days prior to being out of medication.

## 2023-11-16 ENCOUNTER — OFFICE VISIT (OUTPATIENT)
Age: 68
End: 2023-11-16

## 2023-11-16 VITALS
OXYGEN SATURATION: 95 % | DIASTOLIC BLOOD PRESSURE: 80 MMHG | HEART RATE: 89 BPM | WEIGHT: 213 LBS | SYSTOLIC BLOOD PRESSURE: 120 MMHG | HEIGHT: 71 IN | BODY MASS INDEX: 29.82 KG/M2

## 2023-11-16 DIAGNOSIS — I25.5 ISCHEMIC CARDIOMYOPATHY: ICD-10-CM

## 2023-11-16 DIAGNOSIS — Z95.810 PRESENCE OF AUTOMATIC (IMPLANTABLE) CARDIAC DEFIBRILLATOR: Primary | ICD-10-CM

## 2023-11-16 DIAGNOSIS — I25.83 CORONARY ARTERY DISEASE DUE TO LIPID RICH PLAQUE: ICD-10-CM

## 2023-11-16 DIAGNOSIS — I25.10 CORONARY ARTERY DISEASE DUE TO LIPID RICH PLAQUE: ICD-10-CM

## 2023-11-16 NOTE — PROGRESS NOTES
[facility]    [address] [place] [address] is 70 y.o. male with a history of coronary artery disease,  status post OM1 stent in November, 2015, diabetes, hypertension, stroke, hyperlipidemia, cardiomyopathy s/p BiV ICD in 07/16      Mr. Joaquín Ramirez is here today for follow up appointment for his CAD and ischemic cardiomyopathy   Patient has known history of coronary disease status post OM1 stent in 11/2015. He also has history of cardiomyopathy with a EF of 35% in 2016. Because of persistent LV dysfunction despite being on appropriate medical management, he underwent placement of BiV AICD in 07/2016  Echo in 07/2022 with LVEF 20 to 25%      Patient is here today for follow-up appointment for his CHF. Recent device interrogation suggested AICD battery at end-of-life. RYLIE initiated   He denies any use of nitroglycerin. He is along with the niece. He has mild dyspnea on moderate exertion which has remained stable. Using 1 pillow at night. Past Medical History:   Diagnosis Date    Biventricular ICD (implantable cardioverter-defibrillator) in place 07/16    Medtronic    CAD (coronary artery disease)     Cardiomyopathy, ischemic     EF 30% (04/16) 30-35% (11/15)    Diabetes ([address])     DVT (deep venous thrombosis) ([address]) 08/16    L axillary vein after PPM insertion    HLD (hyperlipidemia)     Hypertension     NSTEMI (non-ST elevated myocardial infarction) ([address])     S/P OM1 2.5 X 23 mm XIENCE (11/15)    PAD (peripheral artery disease) ([facility])     S/P LE intervention and thrombectomy    Pulmonary emphysema ([address])     Stroke ([address])        Review of Systems:  Cardiac symptoms as noted above in HPI. All others negative.   Current Outpatient Medications   Medication Sig    empagliflozin (JARDIANCE) 10 MG tablet Take 1 tablet by mouth daily    spironolactone (ALDACTONE) 25 MG tablet Take 0.5 tablets by mouth daily    pregabalin (LYRICA) 75 MG capsule Take 75 mg by

## 2023-12-08 ENCOUNTER — TELEPHONE (OUTPATIENT)
Age: 68
End: 2023-12-08

## 2023-12-08 NOTE — TELEPHONE ENCOUNTER
Patient is scheduled for a BIV AICD Gen Change with Dr Vanna Ding on 1/2/52024. Per patients insurance [employer] [facility] ref # is patients member # and 12/08/2023.

## 2023-12-29 DIAGNOSIS — I25.83 CORONARY ARTERY DISEASE DUE TO LIPID RICH PLAQUE: ICD-10-CM

## 2023-12-29 DIAGNOSIS — Z95.810 PRESENCE OF AUTOMATIC (IMPLANTABLE) CARDIAC DEFIBRILLATOR: ICD-10-CM

## 2023-12-29 DIAGNOSIS — I25.10 CORONARY ARTERY DISEASE DUE TO LIPID RICH PLAQUE: ICD-10-CM

## 2023-12-29 DIAGNOSIS — I25.5 ISCHEMIC CARDIOMYOPATHY: ICD-10-CM

## 2024-01-05 ENCOUNTER — TELEPHONE (OUTPATIENT)
Age: 69
End: 2024-01-05

## 2024-01-09 ENCOUNTER — HOSPITAL ENCOUNTER (OUTPATIENT)
Facility: HOSPITAL | Age: 69
Setting detail: OUTPATIENT SURGERY
Discharge: HOME | End: 2024-01-09
Attending: INTERNAL MEDICINE | Admitting: INTERNAL MEDICINE
Payer: MEDICARE

## 2024-01-09 ENCOUNTER — ANESTHESIA (OUTPATIENT)
Facility: HOSPITAL | Age: 69
End: 2024-01-09
Payer: MEDICARE

## 2024-01-09 ENCOUNTER — ANESTHESIA EVENT (OUTPATIENT)
Facility: HOSPITAL | Age: 69
End: 2024-01-09
Payer: MEDICARE

## 2024-01-09 VITALS
RESPIRATION RATE: 14 BRPM | DIASTOLIC BLOOD PRESSURE: 84 MMHG | OXYGEN SATURATION: 98 % | HEART RATE: 60 BPM | SYSTOLIC BLOOD PRESSURE: 131 MMHG

## 2024-01-09 DIAGNOSIS — Z95.810 AICD (AUTOMATIC CARDIOVERTER/DEFIBRILLATOR) PRESENT: Primary | ICD-10-CM

## 2024-01-09 DIAGNOSIS — I25.10 CAD (CORONARY ARTERY DISEASE): ICD-10-CM

## 2024-01-09 LAB
ANION GAP SERPL CALC-SCNC: 5 MMOL/L (ref 3–18)
BASOPHILS # BLD: 0 K/UL (ref 0–0.1)
BASOPHILS NFR BLD: 1 % (ref 0–2)
BUN SERPL-MCNC: 17 MG/DL (ref 7–18)
BUN/CREAT SERPL: 14 (ref 12–20)
CALCIUM SERPL-MCNC: 9.2 MG/DL (ref 8.5–10.1)
CHLORIDE SERPL-SCNC: 111 MMOL/L (ref 100–111)
CO2 SERPL-SCNC: 26 MMOL/L (ref 21–32)
CREAT SERPL-MCNC: 1.19 MG/DL (ref 0.6–1.3)
DIFFERENTIAL METHOD BLD: ABNORMAL
EOSINOPHIL # BLD: 0.2 K/UL (ref 0–0.4)
EOSINOPHIL NFR BLD: 5 % (ref 0–5)
ERYTHROCYTE [DISTWIDTH] IN BLOOD BY AUTOMATED COUNT: 14.6 % (ref 11.6–14.5)
GLUCOSE SERPL-MCNC: 112 MG/DL (ref 74–99)
HCT VFR BLD AUTO: 43.5 % (ref 36–48)
HGB BLD-MCNC: 13.4 G/DL (ref 13–16)
IMM GRANULOCYTES # BLD AUTO: 0 K/UL (ref 0–0.04)
IMM GRANULOCYTES NFR BLD AUTO: 0 % (ref 0–0.5)
INR PPP: 1.1 (ref 0.9–1.1)
LYMPHOCYTES # BLD: 1.1 K/UL (ref 0.9–3.6)
LYMPHOCYTES NFR BLD: 22 % (ref 21–52)
MCH RBC QN AUTO: 28 PG (ref 24–34)
MCHC RBC AUTO-ENTMCNC: 30.8 G/DL (ref 31–37)
MCV RBC AUTO: 91 FL (ref 78–100)
MONOCYTES # BLD: 0.6 K/UL (ref 0.05–1.2)
MONOCYTES NFR BLD: 11 % (ref 3–10)
NEUTS SEG # BLD: 3.1 K/UL (ref 1.8–8)
NEUTS SEG NFR BLD: 61 % (ref 40–73)
NRBC # BLD: 0 K/UL (ref 0–0.01)
NRBC BLD-RTO: 0 PER 100 WBC
PLATELET # BLD AUTO: 120 K/UL (ref 135–420)
PMV BLD AUTO: 11.7 FL (ref 9.2–11.8)
POTASSIUM SERPL-SCNC: 3.9 MMOL/L (ref 3.5–5.5)
PROTHROMBIN TIME: 14.5 SEC (ref 11.9–14.7)
RBC # BLD AUTO: 4.78 M/UL (ref 4.35–5.65)
SODIUM SERPL-SCNC: 142 MMOL/L (ref 136–145)
WBC # BLD AUTO: 5.1 K/UL (ref 4.6–13.2)

## 2024-01-09 PROCEDURE — 2720000010 HC SURG SUPPLY STERILE: Performed by: INTERNAL MEDICINE

## 2024-01-09 PROCEDURE — 3700000001 HC ADD 15 MINUTES (ANESTHESIA): Performed by: INTERNAL MEDICINE

## 2024-01-09 PROCEDURE — C1889 IMPLANT/INSERT DEVICE, NOC: HCPCS | Performed by: INTERNAL MEDICINE

## 2024-01-09 PROCEDURE — 2500000003 HC RX 250 WO HCPCS: Performed by: INTERNAL MEDICINE

## 2024-01-09 PROCEDURE — 6360000002 HC RX W HCPCS: Performed by: NURSE ANESTHETIST, CERTIFIED REGISTERED

## 2024-01-09 PROCEDURE — C1882 AICD, OTHER THAN SING/DUAL: HCPCS | Performed by: INTERNAL MEDICINE

## 2024-01-09 PROCEDURE — 85610 PROTHROMBIN TIME: CPT

## 2024-01-09 PROCEDURE — 80048 BASIC METABOLIC PNL TOTAL CA: CPT

## 2024-01-09 PROCEDURE — 3700000000 HC ANESTHESIA ATTENDED CARE: Performed by: INTERNAL MEDICINE

## 2024-01-09 PROCEDURE — 2580000003 HC RX 258: Performed by: NURSE ANESTHETIST, CERTIFIED REGISTERED

## 2024-01-09 PROCEDURE — 33264 RMVL & RPLCMT DFB GEN MLT LD: CPT | Performed by: INTERNAL MEDICINE

## 2024-01-09 PROCEDURE — 85025 COMPLETE CBC W/AUTO DIFF WBC: CPT

## 2024-01-09 PROCEDURE — 2709999900 HC NON-CHARGEABLE SUPPLY: Performed by: INTERNAL MEDICINE

## 2024-01-09 PROCEDURE — 2500000003 HC RX 250 WO HCPCS: Performed by: NURSE ANESTHETIST, CERTIFIED REGISTERED

## 2024-01-09 DEVICE — CRTD DTPA2QQ COBALT XT HF QUAD MRI DF4
Type: IMPLANTABLE DEVICE | Status: FUNCTIONAL
Brand: COBALT™ XT HF QUAD CRT-D MRI SURESCAN™

## 2024-01-09 DEVICE — ENVELOPE CMRM6133 ABSORB LRG MR
Type: IMPLANTABLE DEVICE | Status: FUNCTIONAL
Brand: TYRX™

## 2024-01-09 RX ORDER — FENTANYL CITRATE 50 UG/ML
INJECTION, SOLUTION INTRAMUSCULAR; INTRAVENOUS PRN
Status: DISCONTINUED | OUTPATIENT
Start: 2024-01-09 | End: 2024-01-09 | Stop reason: SDUPTHER

## 2024-01-09 RX ORDER — OXYCODONE HYDROCHLORIDE AND ACETAMINOPHEN 5; 325 MG/1; MG/1
1 TABLET ORAL EVERY 6 HOURS PRN
Qty: 12 TABLET | Refills: 0 | Status: SHIPPED | OUTPATIENT
Start: 2024-01-09 | End: 2024-01-12

## 2024-01-09 RX ORDER — ACETAMINOPHEN 325 MG/1
650 TABLET ORAL EVERY 4 HOURS PRN
Status: DISCONTINUED | OUTPATIENT
Start: 2024-01-09 | End: 2024-01-09 | Stop reason: HOSPADM

## 2024-01-09 RX ORDER — SODIUM CHLORIDE 9 MG/ML
INJECTION, SOLUTION INTRAVENOUS CONTINUOUS PRN
Status: DISCONTINUED | OUTPATIENT
Start: 2024-01-09 | End: 2024-01-09 | Stop reason: SDUPTHER

## 2024-01-09 RX ORDER — MIDAZOLAM HYDROCHLORIDE 1 MG/ML
INJECTION INTRAMUSCULAR; INTRAVENOUS PRN
Status: DISCONTINUED | OUTPATIENT
Start: 2024-01-09 | End: 2024-01-09 | Stop reason: SDUPTHER

## 2024-01-09 RX ORDER — LIDOCAINE HYDROCHLORIDE 20 MG/ML
INJECTION, SOLUTION EPIDURAL; INFILTRATION; INTRACAUDAL; PERINEURAL PRN
Status: DISCONTINUED | OUTPATIENT
Start: 2024-01-09 | End: 2024-01-09 | Stop reason: SDUPTHER

## 2024-01-09 RX ORDER — PROPOFOL 10 MG/ML
INJECTION, EMULSION INTRAVENOUS PRN
Status: DISCONTINUED | OUTPATIENT
Start: 2024-01-09 | End: 2024-01-09 | Stop reason: SDUPTHER

## 2024-01-09 RX ORDER — OXYCODONE HYDROCHLORIDE AND ACETAMINOPHEN 5; 325 MG/1; MG/1
1 TABLET ORAL EVERY 4 HOURS PRN
Status: DISCONTINUED | OUTPATIENT
Start: 2024-01-09 | End: 2024-01-09 | Stop reason: HOSPADM

## 2024-01-09 RX ADMIN — SODIUM CHLORIDE: 9 INJECTION, SOLUTION INTRAVENOUS at 08:52

## 2024-01-09 RX ADMIN — PROPOFOL 75 MCG/KG/MIN: 10 INJECTION, EMULSION INTRAVENOUS at 09:14

## 2024-01-09 RX ADMIN — PROPOFOL 30 MG: 10 INJECTION, EMULSION INTRAVENOUS at 09:06

## 2024-01-09 RX ADMIN — MIDAZOLAM 1 MG: 1 INJECTION, SOLUTION INTRAMUSCULAR; INTRAVENOUS at 09:00

## 2024-01-09 RX ADMIN — FENTANYL CITRATE 25 MCG: 50 INJECTION INTRAMUSCULAR; INTRAVENOUS at 09:06

## 2024-01-09 RX ADMIN — LIDOCAINE HYDROCHLORIDE 60 MG: 20 INJECTION, SOLUTION EPIDURAL; INFILTRATION; INTRACAUDAL; PERINEURAL at 09:06

## 2024-01-09 RX ADMIN — VANCOMYCIN HYDROCHLORIDE 1000 MG: 1 INJECTION, POWDER, LYOPHILIZED, FOR SOLUTION INTRAVENOUS at 09:05

## 2024-01-09 RX ADMIN — MIDAZOLAM 1 MG: 1 INJECTION, SOLUTION INTRAMUSCULAR; INTRAVENOUS at 08:52

## 2024-01-09 ASSESSMENT — COPD QUESTIONNAIRES: CAT_SEVERITY: MODERATE

## 2024-01-09 NOTE — ANESTHESIA POSTPROCEDURE EVALUATION
Department of Anesthesiology  Postprocedure Note    Patient: Bernard Mcmullen  MRN: [id number]  YOB: 1955  Date of evaluation: 1/9/2024    Procedure Summary       Date: 01/09/24 Room / Location: [facility] EP LAB 1 / [facility] CARDIAC CATH LAB    Anesthesia Start: 0835 Anesthesia Stop: 0949    Procedure: Remove & replace ICD biv multi leads Diagnosis:       CAD (coronary artery disease)      (CAD (coronary artery disease) [I25.10])    Providers: Narinder Centeno MD Responsible Provider: Seven Morales MD    Anesthesia Type: MAC ASA Status: 3            Anesthesia Type: MAC    Gail Phase I:      Gail Phase II:      Anesthesia Post Evaluation    Patient location during evaluation: PACU  Patient participation: complete - patient participated  Level of consciousness: sleepy but conscious  Pain score: 0  Airway patency: patent  Nausea & Vomiting: no nausea and no vomiting  Cardiovascular status: blood pressure returned to baseline  Respiratory status: acceptable  Hydration status: euvolemic  Pain management: adequate    There were no known notable events for this encounter.

## 2024-01-09 NOTE — PROGRESS NOTES
Cath holding summary:    0950: Verbal report received from Callie on Bernard Mcmullen being received from EP lab for ordered procedure. Report consisted of patient's Situation, Background, Assessment and Recommendations (SBAR). Information from the following report(s) Nurse Handoff Report, Surgery Report, and MAR was reviewed with the receiving nurse. Pt A&O x 4, no c/o pain. Opportunity for questions and clarification provided.  Procedure: EP Study  Intervention: Yes  Site: Chest, Left

## 2024-01-09 NOTE — PROGRESS NOTES
Cath holding summary    Patient escorted to cath holding from waiting area ambulatory, alert and oriented x 4, voicing no complaints.  Changed into gown and placed on monitor.  NPO since MN.  Lab results, med rec and H&P reviewed on chart.  PIV x 2 inserted without difficulty.  Family to bedside.

## 2024-01-09 NOTE — PROGRESS NOTES
TRANSFER - IN REPORT:  Verbal report received from April (name) on Bernard Mcmullen  being received from EP Lab (unit) for ordered procedure   Report consisted of patient’s Situation, Background, Assessment and   Recommendations(SBAR).   Information from the following report(s) SBAR, Procedure Summary, Intake/Output, and MAR was reviewed with the receiving nurse.  Opportunity for questions and clarification was provided.    Assessment completed upon patient’s arrival to unit and care assumed.   Successful ICD generator change out. Pt tolerated well. VSS. A/Ox4. Denies pain.    1025  Pt is sitting up, eating and drinking. Denies pain.

## 2024-01-09 NOTE — PROGRESS NOTES
AVS Discharge instructions reviewed with patient and copy given to patient.  All questions answered.  Patient verbalized understanding to all discharge instructions.  PIV removed. Procedural site within normal limits.  No hematoma or bleeding noted from procedural and PIV site. No pain noted at discharge.  Patient discharged with support person in stable condition.  Escorted out to vehicle for transport home.\

## 2024-01-09 NOTE — DISCHARGE INSTRUCTIONS
Restart eliquis on Friday January 12th, 2024  Disposition:  Will need follow-up with device/wound check in 7 days in my office.  Please contact office at [phone] to confirm appointment.  Main Office:    [address], [address]  [place]  [address]    Restrictions:  For affected arm:  No lifting greater than 10 lbs or lifting elbow above shoulder for 4 weeks.  Keep incision clean and dry for a total of 72 hours after procedure.  Remove dressing in 7 days if not already removed.  No hot tubs or pools for 2 weeks.  OK to shower with \"pat\" dry incision after 72 hours.  Absolutely no driving for 24 hours, minimize ideally for 1 week due to concern for airbag.

## 2024-01-09 NOTE — ANESTHESIA PRE PROCEDURE
Department of Anesthesiology  Preprocedure Note       Name:  Bernard Mcmullen   Age:  68 y.o.  :  1955                                          MRN:  [id number]         Date:  2024      Surgeon: Surgeon(s):  Narinder Centeno MD    Procedure: Procedure(s):  Remove & replace ICD biv multi leads    Medications prior to admission:   Prior to Admission medications    Medication Sig Start Date End Date Taking? Authorizing Provider   empagliflozin (JARDIANCE) 10 MG tablet Take 1 tablet by mouth daily 23   Lamont Lozada MD   spironolactone (ALDACTONE) 25 MG tablet Take 0.5 tablets by mouth daily 23   Lamont Lozada MD   pregabalin (LYRICA) 75 MG capsule Take 1 capsule by mouth 2 times daily.    Provider, MD Cathleen   sacubitril-valsartan (ENTRESTO) 49-51 MG per tablet Take 1 tablet by mouth 2 times daily 3/30/23   Lamont Lozada MD   acetaminophen (TYLENOL) 325 MG tablet Take 2 tablets by mouth every 6 hours as needed    Automatic Reconciliation, Ar   albuterol sulfate HFA (PROVENTIL;VENTOLIN;PROAIR) 108 (90 Base) MCG/ACT inhaler Inhale 2 puffs into the lungs every 6 hours as needed 17   Automatic Reconciliation, Ar   amiodarone (CORDARONE) 200 MG tablet Take 1 tablet by mouth daily 7/3/21   Automatic Reconciliation, Ar   apixaban (ELIQUIS) 5 MG TABS tablet Take 1 tablet by mouth 2 times daily 21   Automatic Reconciliation, Ar   aspirin 81 MG EC tablet Take 1 tablet by mouth daily 22   Automatic Reconciliation, Ar   atorvastatin (LIPITOR) 40 MG tablet Take by mouth daily    Automatic Reconciliation, Ar   cetirizine (ZYRTEC) 10 MG tablet Take 1 tablet by mouth daily as needed 21   Automatic Reconciliation, Ar   vitamin D (CHOLECALCIFEROL) 25 MCG (1000 UT) TABS tablet Take 1 tablet by mouth 2 times daily 21   Automatic Reconciliation, Ar   fluticasone (FLONASE) 50 MCG/ACT nasal spray 2 sprays by Nasal route daily    Automatic Reconciliation, Ar   furosemide (LASIX) 20

## 2024-01-18 ENCOUNTER — NURSE ONLY (OUTPATIENT)
Age: 69
End: 2024-01-18

## 2024-01-18 DIAGNOSIS — Z95.810 AICD (AUTOMATIC CARDIOVERTER/DEFIBRILLATOR) PRESENT: Primary | ICD-10-CM

## 2024-01-18 DIAGNOSIS — I50.9 HEART FAILURE, UNSPECIFIED (HCC): ICD-10-CM

## 2024-02-05 ENCOUNTER — TELEPHONE (OUTPATIENT)
Age: 69
End: 2024-02-05

## 2024-02-05 NOTE — TELEPHONE ENCOUNTER
Incoming from patient. Two patient identifiers confirmed. Patient requesting medication refill.    PCP: Vincent Velasco MD  Last appt:  11/16/2023   Future Appointments   Date Time Provider Department Center   6/18/2024  9:45 AM Lamont Lozada MD [facility] AMB       Medication requested: entresto 49-51mg    Pharmacy: [facility] #[id number]

## 2024-02-06 RX ORDER — SACUBITRIL AND VALSARTAN 49; 51 MG/1; MG/1
1 TABLET, FILM COATED ORAL 2 TIMES DAILY
Qty: 60 TABLET | Refills: 5 | Status: SHIPPED | OUTPATIENT
Start: 2024-02-06

## 2024-04-04 DIAGNOSIS — I10 ESSENTIAL HYPERTENSION: ICD-10-CM

## 2024-04-04 DIAGNOSIS — I10 ESSENTIAL HYPERTENSION WITH GOAL BLOOD PRESSURE LESS THAN 140/90: Primary | ICD-10-CM

## 2024-04-04 NOTE — TELEPHONE ENCOUNTER
PCP: Vincent Velasco MD    Last appt:  Visit date not found   Future Appointments   Date Time Provider Department Center   6/18/2024  9:45 AM Lamont Lozada MD [facility] AMB       Requested Prescriptions     Pending Prescriptions Disp Refills    spironolactone (ALDACTONE) 25 MG tablet 30 tablet 3     Sig: Take 0.5 tablets by mouth daily    empagliflozin (JARDIANCE) 10 MG tablet 90 tablet 2     Sig: Take 1 tablet by mouth daily       Request for a 30 or 90 day supply? Provider Discretion    Pharmacy: confirm    Other Comments: n/a

## 2024-04-05 RX ORDER — SPIRONOLACTONE 25 MG/1
12.5 TABLET ORAL DAILY
Qty: 30 TABLET | Refills: 3 | Status: SHIPPED | OUTPATIENT
Start: 2024-04-05

## 2024-07-19 RX ORDER — SACUBITRIL AND VALSARTAN 49; 51 MG/1; MG/1
1 TABLET, FILM COATED ORAL 2 TIMES DAILY
Qty: 60 TABLET | Refills: 8 | Status: SHIPPED | OUTPATIENT
Start: 2024-07-19

## 2024-07-19 NOTE — TELEPHONE ENCOUNTER
PCP: Vincent Velasco MD    Last appt:  11/16/2023   Future Appointments   Date Time Provider Department Center   8/15/2024  8:15 AM Lamont Lozada MD [facility] AMB       Requested Prescriptions     Pending Prescriptions Disp Refills    ENTRESTO 49-51 MG per tablet [Pharmacy Med Name: ENTRESTO 49 MG-51 MG TABLET] 60 tablet 5     Sig: TAKE 1 TABLET BY MOUTH TWICE A DAY       Request for a 30 or 90 day supply? Provider Discretion    Pharmacy: confirmed     Other Comments:n/a

## 2024-07-29 ENCOUNTER — NURSE ONLY (OUTPATIENT)
Age: 69
End: 2024-07-29

## 2024-07-29 DIAGNOSIS — Z95.810 PRESENCE OF AUTOMATIC (IMPLANTABLE) CARDIAC DEFIBRILLATOR: ICD-10-CM

## 2024-07-29 DIAGNOSIS — I48.0 PAF (PAROXYSMAL ATRIAL FIBRILLATION) (HCC): ICD-10-CM

## 2024-07-29 DIAGNOSIS — I50.9 HEART FAILURE, UNSPECIFIED (HCC): ICD-10-CM

## 2024-07-29 DIAGNOSIS — Z95.810 AICD (AUTOMATIC CARDIOVERTER/DEFIBRILLATOR) PRESENT: Primary | ICD-10-CM

## 2024-07-29 DIAGNOSIS — I25.5 ISCHEMIC CARDIOMYOPATHY: ICD-10-CM

## 2024-07-30 RX ORDER — ALBUTEROL SULFATE 90 UG/1
INHALANT RESPIRATORY (INHALATION)
Qty: 13.4 EACH | Refills: 2 | OUTPATIENT
Start: 2024-07-30

## 2024-08-15 ENCOUNTER — OFFICE VISIT (OUTPATIENT)
Age: 69
End: 2024-08-15

## 2024-08-15 VITALS
HEART RATE: 86 BPM | HEIGHT: 71 IN | BODY MASS INDEX: 29.12 KG/M2 | SYSTOLIC BLOOD PRESSURE: 115 MMHG | WEIGHT: 208 LBS | DIASTOLIC BLOOD PRESSURE: 79 MMHG | OXYGEN SATURATION: 97 %

## 2024-08-15 DIAGNOSIS — I25.10 CORONARY ARTERY DISEASE WITHOUT ANGINA PECTORIS, UNSPECIFIED VESSEL OR LESION TYPE, UNSPECIFIED WHETHER NATIVE OR TRANSPLANTED HEART: ICD-10-CM

## 2024-08-15 DIAGNOSIS — I50.9 HEART FAILURE, UNSPECIFIED HF CHRONICITY, UNSPECIFIED HEART FAILURE TYPE (HCC): ICD-10-CM

## 2024-08-15 DIAGNOSIS — E78.00 PURE HYPERCHOLESTEROLEMIA: ICD-10-CM

## 2024-08-15 DIAGNOSIS — I25.10 CORONARY ARTERY DISEASE WITHOUT ANGINA PECTORIS, UNSPECIFIED VESSEL OR LESION TYPE, UNSPECIFIED WHETHER NATIVE OR TRANSPLANTED HEART: Primary | ICD-10-CM

## 2024-08-15 RX ORDER — BENZONATATE 100 MG/1
CAPSULE ORAL
COMMUNITY
Start: 2024-07-27

## 2024-08-15 RX ORDER — BUDESONIDE, GLYCOPYRROLATE, AND FORMOTEROL FUMARATE 160; 9; 4.8 UG/1; UG/1; UG/1
AEROSOL, METERED RESPIRATORY (INHALATION)
COMMUNITY
Start: 2024-06-27

## 2024-08-15 ASSESSMENT — PATIENT HEALTH QUESTIONNAIRE - PHQ9
SUM OF ALL RESPONSES TO PHQ QUESTIONS 1-9: 0
1. LITTLE INTEREST OR PLEASURE IN DOING THINGS: NOT AT ALL
2. FEELING DOWN, DEPRESSED OR HOPELESS: NOT AT ALL
SUM OF ALL RESPONSES TO PHQ QUESTIONS 1-9: 0
SUM OF ALL RESPONSES TO PHQ9 QUESTIONS 1 & 2: 0

## 2024-08-15 NOTE — PROGRESS NOTES
[facility]    Bernard Mcmullen is 69 y.o. male with a history of coronary artery disease,  status post OM1 stent in November, 2015, diabetes, hypertension, stroke, hyperlipidemia, cardiomyopathy s/p BiV ICD in 07/16      Mr. Mcmullen is here today for follow up appointment for his CAD and ischemic cardiomyopathy   Patient has known history of coronary disease status post OM1 stent in 11/2015.   He also has history of cardiomyopathy with a EF of 35% in 2016.   Because of persistent LV dysfunction despite being on appropriate medical management, he underwent placement of BiV AICD in 07/2016  Echo in 07/2022 with LVEF 20 to 25%      Patient is here today for follow-up appointment for his CHF.  Patient has undergone battery/generator change in 02/2024.  No ICD shock  Very random dyspnea however no functional limitation.  No chest pain or chest tightness.  No significant lower extremity swelling.  No palpitation, presyncope syncope.    Past Medical History:   Diagnosis Date    Biventricular ICD (implantable cardioverter-defibrillator) in place 07/16    Medtronic    CAD (coronary artery disease)     Cardiomyopathy, ischemic     EF 30% (04/16) 30-35% (11/15)    Diabetes ([facility])     DVT (deep venous thrombosis) ([facility]) 08/16    L axillary vein after PPM insertion    HLD (hyperlipidemia)     Hypertension     NSTEMI (non-ST elevated myocardial infarction) ([facility])     S/P OM1 2.5 X 23 mm XIENCE (11/15)    PAD (peripheral artery disease) ([facility])     S/P LE intervention and thrombectomy    Pulmonary emphysema ([facility])     Stroke ([facility])        Review of Systems:  Cardiac symptoms as noted above in HPI. All others negative.  Current Outpatient Medications   Medication Sig    AGUSTINA [facility] 160-9-4.8 MCG/ACT AERO INHALE 2 PUFFS BY MOUTH TWICE A DAY. RINSE MOUTH AFTER EACH USE    benzonatate (TESSALON) 100 MG capsule TAKE 1 CAPSULE BY MOUTH THREE TIMES A DAY AS NEEDED FOR

## 2024-08-15 NOTE — PROGRESS NOTES
Identified pt with two pt identifiers(name and ). Reviewed record in preparation for visit and have obtained necessary documentation.    Bernard Johnsoned Mcmullen presents today for   Chief Complaint   Patient presents with    Follow-up       Pt c/o  HEADACHES,             Bernard Mcmullen preferred language for health care discussion is english/other.    Personal Protective Equipment:   Personal Protective Equipment was used including: mask-surgical and hands-gloves. Patient was placed on no precaution(s). Patient was masked.    Precautions:   Patient currently on None  Patient currently roomed with door closed.    Is someone accompanying this pt? Care giver    Is the patient using any DME equipment during OV? cane    Depression Screenin/15/2024     7:56 AM 3/30/2023     9:04 AM 2022     8:24 AM 2022     2:15 PM 2022    10:21 AM 9/10/2021     1:07 PM 9/10/2021    12:57 PM   PHQ-9 Questionaire   Little interest or pleasure in doing things 0 0 0 0 0 0 0   Feeling down, depressed, or hopeless 0 0 0 0 0  0   PHQ-9 Total Score 0 0 0 0 0 0 0        Learning Assessment:  No question data found.    Abuse Screenin/15/2024     7:00 AM 2023     9:00 AM 3/30/2023     8:00 AM   AMB Abuse Screening   Do you ever feel afraid of your partner? N N N   Are you in a relationship with someone who physically or mentally threatens you? N N N   Is it safe for you to go home? Y Y Y          Fall Risk      8/15/2024     7:57 AM 2023     9:50 AM 3/30/2023     9:04 AM   Fall Risk   Do you feel unsteady or are you worried about falling?  no no no   2 or more falls in past year? no no no   Fall with injury in past year? no no no         Pt currently taking Anticoagulant /Antiplatelet therapy? aspirin    Coordination of Care:  1. Have you been to the ER, urgent care clinic since your last visit? Hospitalized since your last visit? no    2. Have you seen or consulted any other health care providers

## 2024-08-15 NOTE — PATIENT INSTRUCTIONS
Echo  PATIENT PREPS:   -Wear easy to remove shirt to your appointment for easier accessibility.    Nuclear Stress Instructions-  PATIENT PREPS:   -NPO (Nothing to eat or drink) after midnight the night prior to the exam.    -No medications on the day of exam. You may bring them with you.   -Wear comfortable clothing and shoes suitable for walking on a treadmill. (NO sandals, flip flops, high heels, etc)   -The duration of this exam is approximately 2 to 4 hours.      **call office 3-5 days after testing is completed for results** Please ensure testing is completed prior to scheduled follow up appointment. If it is not completed your appointment may be rescheduled**      Labs  Lipid-nothing to eat prior to lab  bmp    *[facility], [address]  ( M - Thur 8am to 3:30pm.) - You must call to make an appointment for blood work at this site.   Contact :[phone]  *[facility] [address] [place] [address]  *[facility], [address] [place] [address]  *[facility], [address] [facility]  *[facility], [address] [facility]

## 2024-11-21 ENCOUNTER — OFFICE VISIT (OUTPATIENT)
Age: 69
End: 2024-11-21
Payer: MEDICARE

## 2024-11-21 VITALS
SYSTOLIC BLOOD PRESSURE: 113 MMHG | OXYGEN SATURATION: 98 % | BODY MASS INDEX: 28.14 KG/M2 | DIASTOLIC BLOOD PRESSURE: 74 MMHG | HEART RATE: 82 BPM | WEIGHT: 201 LBS | HEIGHT: 71 IN

## 2024-11-21 DIAGNOSIS — I10 ESSENTIAL HYPERTENSION WITH GOAL BLOOD PRESSURE LESS THAN 140/90: ICD-10-CM

## 2024-11-21 DIAGNOSIS — I25.10 CORONARY ARTERY DISEASE DUE TO LIPID RICH PLAQUE: Primary | ICD-10-CM

## 2024-11-21 DIAGNOSIS — I25.5 ISCHEMIC CARDIOMYOPATHY: ICD-10-CM

## 2024-11-21 DIAGNOSIS — E78.00 PURE HYPERCHOLESTEROLEMIA: ICD-10-CM

## 2024-11-21 DIAGNOSIS — I25.83 CORONARY ARTERY DISEASE DUE TO LIPID RICH PLAQUE: Primary | ICD-10-CM

## 2024-11-21 PROCEDURE — 1123F ACP DISCUSS/DSCN MKR DOCD: CPT | Performed by: INTERNAL MEDICINE

## 2024-11-21 PROCEDURE — 1126F AMNT PAIN NOTED NONE PRSNT: CPT | Performed by: INTERNAL MEDICINE

## 2024-11-21 PROCEDURE — 3078F DIAST BP <80 MM HG: CPT | Performed by: INTERNAL MEDICINE

## 2024-11-21 PROCEDURE — 3074F SYST BP LT 130 MM HG: CPT | Performed by: INTERNAL MEDICINE

## 2024-11-21 PROCEDURE — 99214 OFFICE O/P EST MOD 30 MIN: CPT | Performed by: INTERNAL MEDICINE

## 2024-11-21 PROCEDURE — 1159F MED LIST DOCD IN RCRD: CPT | Performed by: INTERNAL MEDICINE

## 2024-11-21 NOTE — PROGRESS NOTES
[facility]    Bernard Mcmullen is 69 y.o. male with a history of coronary artery disease,  status post OM1 stent in November, 2015, diabetes, hypertension, stroke, hyperlipidemia, cardiomyopathy s/p BiV ICD in 07/16      Mr. Mcmullen is here today for follow up appointment for his CAD and ischemic cardiomyopathy   Patient has known history of coronary disease status post OM1 stent in 11/2015.   He also has history of cardiomyopathy with a EF of 35% in 2016.   BiV AICD in 07/2016  NST in 10/2024: Moderate risk with LV dysfunction and fixed inferior/inferolateral defect without ischemia  Echo in 10/2024 with EF 25%      Patient is here today for follow-up appointment for his CHF.  Patient has undergone battery/generator change in 02/2024.  No ICD shock  Random dyspnea however no exertional limitation.  No resting or exertional chest pain or chest tightness.  No orthopnea or PND.  No lower extremity swelling.  Taking medication as prescribed    Past Medical History:   Diagnosis Date    Biventricular ICD (implantable cardioverter-defibrillator) in place 07/16    [employer]    CAD (coronary artery disease)     Cardiomyopathy, ischemic     EF 30% (04/16) 30-35% (11/15)    Diabetes ([facility])     DVT (deep venous thrombosis) ([facility]) 08/16    L axillary vein after PPM insertion    HLD (hyperlipidemia)     Hypertension     NSTEMI (non-ST elevated myocardial infarction) ([facility])     S/P OM1 2.5 X 23 mm XIENCE (11/15)    PAD (peripheral artery disease) ([facility])     S/P LE intervention and thrombectomy    Pulmonary emphysema ([facility])     Stroke ([facility])        Review of Systems:  Cardiac symptoms as noted above in HPI. All others negative.  Current Outpatient Medications   Medication Sig    sacubitril-valsartan (ENTRESTO) 49-51 MG per tablet TAKE 1 TABLET BY MOUTH TWICE A DAY    spironolactone (ALDACTONE) 25 MG tablet Take 0.5 tablets by mouth daily    empagliflozin (JARDIANCE) 10

## 2024-11-21 NOTE — PROGRESS NOTES
Identified pt with two pt identifiers(name and ). Reviewed record in preparation for visit and have obtained necessary documentation.    Bernard Mcmullen presents today for   Chief Complaint   Patient presents with    Follow-up     3mo f/u post nst, echo           Pt c/o DIZZINESS, SOB, CHEST PAIN/ PRESSURE, FATIGUE/WEAKNESS, HEADACHES, SWELLING.             Bernard Mcmullen preferred language for health care discussion is english/other.    Personal Protective Equipment:   Personal Protective Equipment was used including: mask-surgical and hands-gloves. Patient was placed on no precaution(s). Patient was masked.    Precautions:   Patient currently on None  Patient currently roomed with door closed.    Is someone accompanying this pt? no    Is the patient using any DME equipment during OV? no    Depression Screenin/15/2024     7:56 AM 3/30/2023     9:04 AM 2022     8:24 AM 2022     2:15 PM 2022    10:21 AM 9/10/2021     1:07 PM 9/10/2021    12:57 PM   PHQ-9 Questionaire   Little interest or pleasure in doing things 0 0 0 0 0 0 0   Feeling down, depressed, or hopeless 0 0 0 0 0  0   PHQ-9 Total Score 0 0 0 0 0 0 0        Learning Assessment:  No question data found.    Abuse Screenin/15/2024     7:00 AM 2023     9:00 AM 3/30/2023     8:00 AM   AMB Abuse Screening   Do you ever feel afraid of your partner? N N N   Are you in a relationship with someone who physically or mentally threatens you? N N N   Is it safe for you to go home? Y Y Y          Fall Risk      8/15/2024     7:57 AM 2023     9:50 AM 3/30/2023     9:04 AM   Fall Risk   Do you feel unsteady or are you worried about falling?  no no no   2 or more falls in past year? no no no   Fall with injury in past year? no no no         Pt currently taking Anticoagulant /Antiplatelet therapy? no    Coordination of Care:  1. Have you been to the ER, urgent care clinic since your last visit? Hospitalized since your last

## 2024-12-10 ENCOUNTER — TELEPHONE (OUTPATIENT)
Facility: CLINIC | Age: 69
End: 2024-12-10

## 2024-12-10 NOTE — TELEPHONE ENCOUNTER
Called [phone], spoke to pt's niece Michelle Canseco was advised that pt no longer has Dr. Velasco as his PCP, goes exclusively to the [facility] for all of his care. Advised her that Dr. Velasco would be notified.

## 2025-04-17 DIAGNOSIS — I50.9 HEART FAILURE, UNSPECIFIED (HCC): ICD-10-CM

## 2025-04-17 DIAGNOSIS — I50.9 HEART FAILURE, UNSPECIFIED HF CHRONICITY, UNSPECIFIED HEART FAILURE TYPE (HCC): Primary | ICD-10-CM

## 2025-04-17 DIAGNOSIS — I10 ESSENTIAL HYPERTENSION WITH GOAL BLOOD PRESSURE LESS THAN 140/90: ICD-10-CM

## 2025-04-17 DIAGNOSIS — I25.83 CORONARY ARTERY DISEASE DUE TO LIPID RICH PLAQUE: ICD-10-CM

## 2025-04-17 DIAGNOSIS — I25.10 CORONARY ARTERY DISEASE DUE TO LIPID RICH PLAQUE: ICD-10-CM

## 2025-04-17 DIAGNOSIS — I25.5 ISCHEMIC CARDIOMYOPATHY: ICD-10-CM

## 2025-04-17 RX ORDER — SACUBITRIL AND VALSARTAN 49; 51 MG/1; MG/1
1 TABLET, FILM COATED ORAL 2 TIMES DAILY
Qty: 60 TABLET | Refills: 3 | Status: SHIPPED | OUTPATIENT
Start: 2025-04-17

## 2025-04-24 ENCOUNTER — RESULTS FOLLOW-UP (OUTPATIENT)
Dept: CARDIOLOGY | Facility: HOSPITAL | Age: 70
End: 2025-04-24

## 2025-04-24 ENCOUNTER — CLINICAL SUPPORT (OUTPATIENT)
Age: 70
End: 2025-04-24
Payer: MEDICARE

## 2025-04-24 DIAGNOSIS — I48.0 PAF (PAROXYSMAL ATRIAL FIBRILLATION) (HCC): ICD-10-CM

## 2025-04-24 DIAGNOSIS — I50.9 HEART FAILURE, UNSPECIFIED (HCC): ICD-10-CM

## 2025-04-24 DIAGNOSIS — Z95.810 PRESENCE OF AUTOMATIC (IMPLANTABLE) CARDIAC DEFIBRILLATOR: ICD-10-CM

## 2025-04-24 DIAGNOSIS — I25.5 ISCHEMIC CARDIOMYOPATHY: ICD-10-CM

## 2025-04-24 DIAGNOSIS — I50.9 HEART FAILURE, UNSPECIFIED HF CHRONICITY, UNSPECIFIED HEART FAILURE TYPE (HCC): ICD-10-CM

## 2025-04-24 DIAGNOSIS — Z95.810 AICD (AUTOMATIC CARDIOVERTER/DEFIBRILLATOR) PRESENT: Primary | ICD-10-CM

## 2025-04-24 PROCEDURE — 93284 PRGRMG EVAL IMPLANTABLE DFB: CPT | Performed by: INTERNAL MEDICINE

## 2025-08-28 ENCOUNTER — HOSPITAL ENCOUNTER (OUTPATIENT)
Facility: HOSPITAL | Age: 70
Setting detail: SPECIMEN
Discharge: HOME | End: 2025-08-31

## 2025-08-28 ENCOUNTER — OFFICE VISIT (OUTPATIENT)
Age: 70
End: 2025-08-28
Payer: MEDICARE

## 2025-08-28 VITALS
HEART RATE: 94 BPM | HEIGHT: 71 IN | SYSTOLIC BLOOD PRESSURE: 132 MMHG | WEIGHT: 207 LBS | BODY MASS INDEX: 28.98 KG/M2 | OXYGEN SATURATION: 98 % | DIASTOLIC BLOOD PRESSURE: 86 MMHG

## 2025-08-28 DIAGNOSIS — Z95.810 AICD (AUTOMATIC CARDIOVERTER/DEFIBRILLATOR) PRESENT: ICD-10-CM

## 2025-08-28 DIAGNOSIS — I10 ESSENTIAL HYPERTENSION WITH GOAL BLOOD PRESSURE LESS THAN 140/90: ICD-10-CM

## 2025-08-28 DIAGNOSIS — I25.5 ISCHEMIC CARDIOMYOPATHY: ICD-10-CM

## 2025-08-28 DIAGNOSIS — I50.9 HEART FAILURE, UNSPECIFIED HF CHRONICITY, UNSPECIFIED HEART FAILURE TYPE (HCC): Primary | ICD-10-CM

## 2025-08-28 DIAGNOSIS — I10 ESSENTIAL HYPERTENSION: ICD-10-CM

## 2025-08-28 LAB
ANION GAP SERPL CALCULATED.3IONS-SCNC: 15 MMOL/L (ref 3–15)
BUN BLDV-MCNC: 12 MG/DL (ref 6–22)
CALCIUM SERPL-MCNC: 9 MG/DL (ref 8.4–10.5)
CHLORIDE BLD-SCNC: 101 MMOL/L (ref 98–110)
CO2: 22 MMOL/L (ref 20–32)
CREAT SERPL-MCNC: 1 MG/DL (ref 0.8–1.6)
GFR, ESTIMATED: 76.4 ML/MIN/1.73 SQ.M.
GLUCOSE: 238 MG/DL (ref 70–99)
POTASSIUM SERPL-SCNC: 4.2 MMOL/L (ref 3.5–5.5)
SODIUM BLD-SCNC: 138 MMOL/L (ref 133–145)

## 2025-08-28 PROCEDURE — 1123F ACP DISCUSS/DSCN MKR DOCD: CPT | Performed by: INTERNAL MEDICINE

## 2025-08-28 PROCEDURE — 99001 SPECIMEN HANDLING PT-LAB: CPT

## 2025-08-28 PROCEDURE — 3079F DIAST BP 80-89 MM HG: CPT | Performed by: INTERNAL MEDICINE

## 2025-08-28 PROCEDURE — 3075F SYST BP GE 130 - 139MM HG: CPT | Performed by: INTERNAL MEDICINE

## 2025-08-28 PROCEDURE — 99214 OFFICE O/P EST MOD 30 MIN: CPT | Performed by: INTERNAL MEDICINE

## 2025-08-28 PROCEDURE — 1126F AMNT PAIN NOTED NONE PRSNT: CPT | Performed by: INTERNAL MEDICINE

## 2025-08-28 RX ORDER — FUROSEMIDE 20 MG/1
20 TABLET ORAL DAILY
Qty: 30 TABLET | Refills: 5 | Status: SHIPPED | OUTPATIENT
Start: 2025-08-28

## 2025-08-28 ASSESSMENT — LIFESTYLE VARIABLES
HOW MANY STANDARD DRINKS CONTAINING ALCOHOL DO YOU HAVE ON A TYPICAL DAY: PATIENT DOES NOT DRINK
HOW OFTEN DO YOU HAVE A DRINK CONTAINING ALCOHOL: NEVER

## 2025-08-29 LAB — SENTARA SPECIMEN COLLECTION: NORMAL

## (undated) DEVICE — CONTAINER PREFIL FRMLN 15ML --

## (undated) DEVICE — BASIN EMESIS 500CC ROSE 250/CS 60/PLT: Brand: MEDEGEN MEDICAL PRODUCTS, LLC

## (undated) DEVICE — KENDALL RADIOLUCENT FOAM MONITORING ELECTRODE RECTANGULAR SHAPE: Brand: KENDALL

## (undated) DEVICE — 3M™ IOBAN™ 2 ANTIMICROBIAL INCISE DRAPE 6640EZ: Brand: IOBAN™ 2

## (undated) DEVICE — FORCEPS BX L240CM JAW DIA2.8MM L CAP W/ NDL MIC MESH TOOTH

## (undated) DEVICE — LIMB HOLDER, WRIST/ANKLE: Brand: DEROYAL

## (undated) DEVICE — SUTURE ABSORBABLE BRAIDED 2-0 CT-1 27 IN UD VICRYL J259H

## (undated) DEVICE — KIT COLON W/ 1.1OZ LUB AND 2 END

## (undated) DEVICE — SUTURE PERMA HND SZ 0 L18IN NONABSORBABLE BLK L30MM PSL REV 580H

## (undated) DEVICE — PLASMABLADE PS200-040 4.0: Brand: PLASMABLADE™

## (undated) DEVICE — DRAPE STRL ANGIO W/ 2 FLD COLLCTN PCH 86X135 218X343 CM 2

## (undated) DEVICE — DRESSING FOAM 4X6 DISP POSTOP MEPILEX BORD AG

## (undated) DEVICE — SUTURE MCRYL SZ 4 0 L18IN ABSRB VLT PS 1 L24MM 3 8 CIR REV Y682H

## (undated) DEVICE — DEVICE INFL 60ML 12ATM CONVENIENT LOK REL HNDL HI PRSS FLX

## (undated) DEVICE — ELECTRODE PT RET AD L9FT HI MOIST COND ADH HYDRGEL CORDED

## (undated) DEVICE — DRAPE SURG W25XL50IN E OPN CIR BND BG

## (undated) DEVICE — SYR 50ML SLIP TIP NSAF LF STRL --

## (undated) DEVICE — KENDALL 500 SERIES DIAPHORETIC FOAM MONITORING ELECTRODE - TEAR DROP SHAPE ( 30/PK): Brand: KENDALL

## (undated) DEVICE — FLEX ADVANTAGE 1500CC: Brand: FLEX ADVANTAGE

## (undated) DEVICE — MEDI-TRACE CADENCE ADULT, DEFIBRILLATION ELECTRODE -RTS  (10 PR/PK) - PHYSIO-CONTROL: Brand: MEDI-TRACE CADENCE

## (undated) DEVICE — MEDI-VAC NON-CONDUCTIVE SUCTION TUBING: Brand: CARDINAL HEALTH